# Patient Record
Sex: FEMALE | Race: WHITE | Employment: OTHER | URBAN - METROPOLITAN AREA
[De-identification: names, ages, dates, MRNs, and addresses within clinical notes are randomized per-mention and may not be internally consistent; named-entity substitution may affect disease eponyms.]

---

## 2017-10-18 ENCOUNTER — HOSPITAL ENCOUNTER (OUTPATIENT)
Dept: LAB | Age: 67
Discharge: HOME | End: 2017-10-18

## 2017-10-18 PROCEDURE — 88305 TISSUE EXAM BY PATHOLOGIST: CPT | Performed by: INTERNAL MEDICINE

## 2018-07-19 PROBLEM — E66.01 SEVERE OBESITY (BMI 35.0-39.9): Status: ACTIVE | Noted: 2018-07-19

## 2018-09-27 ENCOUNTER — HOSPITAL ENCOUNTER (OUTPATIENT)
Dept: LAB | Age: 68
Discharge: HOME | End: 2018-09-27
Attending: INTERNAL MEDICINE
Payer: MEDICARE

## 2018-09-27 DIAGNOSIS — I70.219 EXTREMITY ATHEROSCLEROSIS WITH INTERMITTENT CLAUDICATION (HCC): ICD-10-CM

## 2018-09-27 LAB
BASOPHILS # BLD: 0.1 K/UL (ref 0–0.2)
BASOPHILS NFR BLD: 1 % (ref 0–2)
DIFFERENTIAL METHOD BLD: ABNORMAL
EOSINOPHIL # BLD: 0.1 K/UL (ref 0–0.8)
EOSINOPHIL NFR BLD: 1 % (ref 0.5–7.8)
ERYTHROCYTE [DISTWIDTH] IN BLOOD BY AUTOMATED COUNT: 13 %
HCT VFR BLD AUTO: 42.9 % (ref 35.8–46.3)
HGB BLD-MCNC: 14.6 G/DL (ref 11.7–15.4)
IMM GRANULOCYTES # BLD: 0.1 K/UL (ref 0–0.5)
IMM GRANULOCYTES NFR BLD AUTO: 1 % (ref 0–5)
LYMPHOCYTES # BLD: 2.8 K/UL (ref 0.5–4.6)
LYMPHOCYTES NFR BLD: 33 % (ref 13–44)
MCH RBC QN AUTO: 31.2 PG (ref 26.1–32.9)
MCHC RBC AUTO-ENTMCNC: 34 G/DL (ref 31.4–35)
MCV RBC AUTO: 91.7 FL (ref 79.6–97.8)
MONOCYTES # BLD: 0.7 K/UL (ref 0.1–1.3)
MONOCYTES NFR BLD: 9 % (ref 4–12)
NEUTS SEG # BLD: 4.8 K/UL (ref 1.7–8.2)
NEUTS SEG NFR BLD: 56 % (ref 43–78)
NRBC # BLD: 0 K/UL (ref 0–0.2)
PLATELET # BLD AUTO: 242 K/UL (ref 150–450)
PMV BLD AUTO: 9.3 FL (ref 9.4–12.3)
RBC # BLD AUTO: 4.68 M/UL (ref 4.05–5.2)
WBC # BLD AUTO: 8.5 K/UL (ref 4.3–11.1)

## 2018-09-27 PROCEDURE — 36415 COLL VENOUS BLD VENIPUNCTURE: CPT

## 2018-09-27 PROCEDURE — 80048 BASIC METABOLIC PNL TOTAL CA: CPT

## 2018-09-27 PROCEDURE — 85025 COMPLETE CBC W/AUTO DIFF WBC: CPT

## 2018-10-05 NOTE — PROGRESS NOTES
Patient pre-assessment complete for JESSIE_DAVID with Dr Jose Luis Zeng scheduled for 10-8-18 at [address] [place] [address], arrival time 6am. Patient verified using . Patient instructed to bring all home medications in labeled bottles on the day of procedure. NPO status reinforced. Patient informed to take a full dose aspirin 325mg  or 81 mg x 4 & plavix 75 mg on the day of procedure. Patient instructed to HOLD lasis on Monday am. Instructed they can take all other medications excluding vitamins & supplements. Patient verbalizes understanding of all instructions & denies any questions at this time.

## 2018-10-08 ENCOUNTER — HOSPITAL ENCOUNTER (OUTPATIENT)
Dept: CARDIAC CATH/INVASIVE PROCEDURES | Age: 68
Discharge: HOME | End: 2018-10-08
Attending: INTERNAL MEDICINE | Admitting: INTERNAL MEDICINE
Payer: MEDICARE

## 2018-10-08 VITALS
DIASTOLIC BLOOD PRESSURE: 55 MMHG | HEIGHT: 60 IN | WEIGHT: 170 LBS | OXYGEN SATURATION: 92 % | SYSTOLIC BLOOD PRESSURE: 113 MMHG | RESPIRATION RATE: 18 BRPM | BODY MASS INDEX: 33.38 KG/M2 | HEART RATE: 54 BPM

## 2018-10-08 LAB
ANION GAP SERPL CALC-SCNC: 5 MMOL/L (ref 7–16)
ATRIAL RATE: 49 BPM
BUN SERPL-MCNC: 16 MG/DL (ref 8–23)
CALCIUM SERPL-MCNC: 9.1 MG/DL (ref 8.3–10.4)
CALCULATED P AXIS, ECG09: 44 DEGREES
CALCULATED R AXIS, ECG10: 41 DEGREES
CALCULATED T AXIS, ECG11: 10 DEGREES
CHLORIDE SERPL-SCNC: 105 MMOL/L (ref 98–107)
CO2 SERPL-SCNC: 28 MMOL/L (ref 21–32)
CREAT SERPL-MCNC: 1.28 MG/DL (ref 0.6–1)
DIAGNOSIS, 93000: NORMAL
ERYTHROCYTE [DISTWIDTH] IN BLOOD BY AUTOMATED COUNT: 12.7 %
GLUCOSE SERPL-MCNC: 95 MG/DL (ref 65–100)
HCT VFR BLD AUTO: 44.3 % (ref 35.8–46.3)
HGB BLD-MCNC: 14.9 G/DL (ref 11.7–15.4)
INR PPP: 1.3
MAGNESIUM SERPL-MCNC: 1.9 MG/DL (ref 1.8–2.4)
MCH RBC QN AUTO: 31.6 PG (ref 26.1–32.9)
MCHC RBC AUTO-ENTMCNC: 33.6 G/DL (ref 31.4–35)
MCV RBC AUTO: 93.9 FL (ref 79.6–97.8)
NRBC # BLD: 0 K/UL (ref 0–0.2)
P-R INTERVAL, ECG05: 160 MS
PLATELET # BLD AUTO: 238 K/UL (ref 150–450)
PMV BLD AUTO: 9 FL (ref 9.4–12.3)
POTASSIUM SERPL-SCNC: 4.2 MMOL/L (ref 3.5–5.1)
PROTHROMBIN TIME: 15.1 SEC (ref 11.5–14.5)
Q-T INTERVAL, ECG07: 454 MS
QRS DURATION, ECG06: 82 MS
QTC CALCULATION (BEZET), ECG08: 410 MS
RBC # BLD AUTO: 4.72 M/UL (ref 4.05–5.2)
SODIUM SERPL-SCNC: 138 MMOL/L (ref 136–145)
VENTRICULAR RATE, ECG03: 49 BPM
WBC # BLD AUTO: 7.6 K/UL (ref 4.3–11.1)

## 2018-10-08 PROCEDURE — 76937 US GUIDE VASCULAR ACCESS: CPT

## 2018-10-08 PROCEDURE — 74011000250 HC RX REV CODE- 250: Performed by: INTERNAL MEDICINE

## 2018-10-08 PROCEDURE — 75625 CONTRAST EXAM ABDOMINL AORTA: CPT

## 2018-10-08 PROCEDURE — C1760 CLOSURE DEV, VASC: HCPCS

## 2018-10-08 PROCEDURE — 93005 ELECTROCARDIOGRAM TRACING: CPT | Performed by: INTERNAL MEDICINE

## 2018-10-08 PROCEDURE — C1894 INTRO/SHEATH, NON-LASER: HCPCS

## 2018-10-08 PROCEDURE — 85027 COMPLETE CBC AUTOMATED: CPT

## 2018-10-08 PROCEDURE — 74011636320 HC RX REV CODE- 636/320: Performed by: INTERNAL MEDICINE

## 2018-10-08 PROCEDURE — 74011250636 HC RX REV CODE- 250/636: Performed by: INTERNAL MEDICINE

## 2018-10-08 PROCEDURE — 85610 PROTHROMBIN TIME: CPT

## 2018-10-08 PROCEDURE — 99152 MOD SED SAME PHYS/QHP 5/>YRS: CPT

## 2018-10-08 PROCEDURE — 77030013687 HC GD NDL BARD -B

## 2018-10-08 PROCEDURE — 77030015395 HC CATH ANGI DX SVU ANGI -C

## 2018-10-08 PROCEDURE — 36245 INS CATH ABD/L-EXT ART 1ST: CPT

## 2018-10-08 PROCEDURE — C1769 GUIDE WIRE: HCPCS

## 2018-10-08 PROCEDURE — 74011250636 HC RX REV CODE- 250/636

## 2018-10-08 PROCEDURE — 80048 BASIC METABOLIC PNL TOTAL CA: CPT

## 2018-10-08 PROCEDURE — 83735 ASSAY OF MAGNESIUM: CPT

## 2018-10-08 PROCEDURE — 99153 MOD SED SAME PHYS/QHP EA: CPT

## 2018-10-08 PROCEDURE — 75716 ARTERY X-RAYS ARMS/LEGS: CPT

## 2018-10-08 RX ORDER — MIDAZOLAM HYDROCHLORIDE 1 MG/ML
.5-5 INJECTION, SOLUTION INTRAMUSCULAR; INTRAVENOUS
Status: DISCONTINUED | OUTPATIENT
Start: 2018-10-08 | End: 2018-10-08 | Stop reason: HOSPADM

## 2018-10-08 RX ORDER — DIAZEPAM 5 MG/1
5 TABLET ORAL ONCE
Status: DISCONTINUED | OUTPATIENT
Start: 2018-10-08 | End: 2018-10-08 | Stop reason: HOSPADM

## 2018-10-08 RX ORDER — GUAIFENESIN 100 MG/5ML
324 LIQUID (ML) ORAL ONCE
Status: DISCONTINUED | OUTPATIENT
Start: 2018-10-08 | End: 2018-10-08 | Stop reason: HOSPADM

## 2018-10-08 RX ORDER — HEPARIN SODIUM 200 [USP'U]/100ML
3 INJECTION, SOLUTION INTRAVENOUS CONTINUOUS
Status: DISCONTINUED | OUTPATIENT
Start: 2018-10-08 | End: 2018-10-08 | Stop reason: HOSPADM

## 2018-10-08 RX ORDER — IODIXANOL 320 MG/ML
50-300 INJECTION, SOLUTION INTRAVASCULAR ONCE
Status: COMPLETED | OUTPATIENT
Start: 2018-10-08 | End: 2018-10-08

## 2018-10-08 RX ORDER — SODIUM CHLORIDE 9 MG/ML
75 INJECTION, SOLUTION INTRAVENOUS CONTINUOUS
Status: DISCONTINUED | OUTPATIENT
Start: 2018-10-08 | End: 2018-10-08 | Stop reason: HOSPADM

## 2018-10-08 RX ORDER — LIDOCAINE HYDROCHLORIDE 10 MG/ML
1-40 INJECTION INFILTRATION; PERINEURAL
Status: DISCONTINUED | OUTPATIENT
Start: 2018-10-08 | End: 2018-10-08 | Stop reason: HOSPADM

## 2018-10-08 RX ORDER — FENTANYL CITRATE 50 UG/ML
25-200 INJECTION, SOLUTION INTRAMUSCULAR; INTRAVENOUS
Status: DISCONTINUED | OUTPATIENT
Start: 2018-10-08 | End: 2018-10-08 | Stop reason: HOSPADM

## 2018-10-08 RX ADMIN — IODIXANOL 100 ML: 320 INJECTION, SOLUTION INTRAVASCULAR at 09:02

## 2018-10-08 RX ADMIN — SODIUM CHLORIDE 75 ML/HR: 900 INJECTION, SOLUTION INTRAVENOUS at 06:46

## 2018-10-08 RX ADMIN — MIDAZOLAM HYDROCHLORIDE 2 MG: 1 INJECTION, SOLUTION INTRAMUSCULAR; INTRAVENOUS at 08:38

## 2018-10-08 RX ADMIN — FENTANYL CITRATE 50 MCG: 50 INJECTION, SOLUTION INTRAMUSCULAR; INTRAVENOUS at 08:39

## 2018-10-08 RX ADMIN — NITROGLYCERIN 0.4 MG: 200 INJECTION, SOLUTION INTRAVENOUS at 08:39

## 2018-10-08 RX ADMIN — HEPARIN SODIUM 3 ML/HR: 200 INJECTION, SOLUTION INTRAVENOUS at 08:26

## 2018-10-08 RX ADMIN — LIDOCAINE HYDROCHLORIDE 20 ML: 10 INJECTION, SOLUTION INFILTRATION; PERINEURAL at 08:27

## 2018-10-08 NOTE — PROCEDURES
Brief Cardiac Procedure Note    Patient: John Query MRN: [id number]  SSN: [id number]    YOB: 1950  Age: 79 y.o. Sex: female      Date of Procedure: 10/8/2018     Pre-procedure Diagnosis: PAD with Claudication    Post-procedure Diagnosis: PAD with Claudication    Procedure:   1.  US access RFA  2. Abdominal aorta gram x 3  3. Oblique pelvic run off  - 1st order catheter placement     Brief Description of Procedure: See note    Performed By: Jacob Meigs, MD     Assistants: None    Anesthesia: Moderate Sedation    Estimated Blood Loss: Less than 10 mL      Specimens: None    Implants: None    Findings:   60-70% distal aorta  95% ostial (R)INGRID  70% ostial (L)INGRID    Complications: None    Recommendations: Continue medical therapy.   Refer to vascular surgery     Signed By: Jacob Meigs, MD     October 8, 2018

## 2018-10-08 NOTE — IP AVS SNAPSHOT
[address] 
[phone] Patient: Belén Stoll MRN: [id number] UID:[id number] Discharge Summary 10/8/2018 Belén Stoll MRN[de-identified]  [id number] Admission Information Provider Pager Service Admission Date Expected D/C Date Myriam Dubon MD  CARDIAC CATH LAB 10/8/2018 10/8/2018 Actual LOS Patient Class 0 days OUTPATIENT Follow-up Information Follow up With Details Comments Contact Info [address] [facility] [address] 
[phone] Brittnee Cruz MD On 11/6/2018 at 10 am [place] [address] [address] [facility] [address] [phone] My Medications TAKE these medications as instructed Instructions Each Dose to Equal  
 Morning Noon Evening Bedtime  
 atenolol 25 mg tablet Commonly known as:  TENORMIN Your last dose was: Your next dose is: Take 1 Tab by mouth two (2) times a day. Indications: hypertension 25 mg  
    
   
   
   
  
 atorvastatin 20 mg tablet Commonly known as:  LIPITOR Your last dose was: Your next dose is: Take  by mouth daily. buPROPion  mg tablet Commonly known as:  Jimenez Dc Your last dose was: Your next dose is: Take 150 mg by mouth every morning. 150 mg CALCIUM-MAGNESIUM-ZINC PO Your last dose was: Your next dose is: Take  by mouth daily. clopidogrel 75 mg Tab Commonly known as:  PLAVIX Your last dose was: Your next dose is: Take 1 Tab by mouth daily. 75 mg FISH OIL EXTRA STRENGTH PO Your last dose was: Your next dose is: Take 1,200 mg by mouth daily. 1200 mg  
    
   
   
   
  
 LASIX 40 mg tablet Generic drug:  furosemide Your last dose was: Your next dose is: Take  by mouth daily. As needed LEXAPRO 10 mg tablet Generic drug:  escitalopram oxalate Your last dose was: Your next dose is: Take 10 mg by mouth daily. 10 mg  
    
   
   
   
  
 nitroglycerin 0.4 mg SL tablet Commonly known as:  NITROSTAT Your last dose was: Your next dose is: 0.4 mg by SubLINGual route every five (5) minutes as needed for Chest Pain. 0.4 mg  
    
   
   
   
  
 Potassium Gluconate 595 mg (99 mg) tablet Your last dose was: Your next dose is: Take 595 mg by mouth nightly. 595 mg  
    
   
   
   
  
 VITAMIN B-12 PO Your last dose was: Your next dose is: Take 1 Tab by mouth daily. 1 Tab General Information Please provide this summary of care documentation to your next provider. Allergies Unspecified:  Adhesive Tape;  Codeine;  Pcn [Penicillins] Low:  Other Medication Current Immunizations  Reviewed on 6/30/2012 Name Date ZZZ-RETIRED (DO NOT USE) Pneumococcal Vaccine (Unspecified Type) 6/30/2006 Discharge Instructions Discharge Instructions Outpatient radiology will call to schedule the CT scan in the next week. Follow-up appointment with Dr. Nola Rene on November 6th at 10 am at [address] [facility]. HEART CATHETERIZATION/ANGIOGRAPHY DISCHARGE INSTRUCTIONS 1. Check puncture site frequently for swelling or bleeding. If there is any bleeding, lie down and apply pressure over the area with a clean towel or washcloth. Notify your doctor for any redness, swelling, drainage, or oozing from the puncture site. Notify your doctor for any fever or chills. 2. If the extremity becomes cold, numb, or painful call Dr. Uzma Crowley at [phone]. 3. Activity should be limited for the next 48 hours. Climb stairs as little as possible and avoid any stooping, bending, or strenuous activity for 48 hours. No heavy lifting (anything over 10 pounds) for 3 days. 4. You may resume your usual diet. Drink more fluids than usual. 
5. Have a responsible person drive you home and stay with you for at least 24 hours after your heart catheterization/angiography. 6. You may remove bandage from your Right and Groin in 24 hours. You may shower in 24 hours. No tub baths, hot tubs, or swimming for 1 week. Do not place any lotions, creams, powders, or ointments over puncture site for 1 week. You may place a clean band-aid over the puncture site each day for 5 days. Change daily. I have read the above instructions and have had the opportunity to ask questions. Patient: ________________________   Date: 10/8/2018 Witness: _______________________   Date: 10/8/2018 Discharge Orders Procedure Order Date Status Priority Quantity Spec Type Associated Dx CTA ABD ART W RUNOFF W WO CONT 10/08/18 0915 Future Routine 1 Questions: Is Patient Allergic to Contrast Dye?:  No  
  STAT Creatinine as indicated: Yes  
        
  
`  
  
    
    
 Patient Signature:  ____________________________________________________________ Date:  ____________________________________________________________  
  
 Alejandra Eaton Provider Signature:  ____________________________________________________________ Date:  ____________________________________________________________

## 2018-10-08 NOTE — PROGRESS NOTES
TRANSFER - OUT REPORT: 
 
Verbal report given to [facility] RN(name) on Marycruz Varner  being transferred to CPRU(unit) for routine progression of care Report consisted of patients Situation, Background, Assessment and  
Recommendations(SBAR). Information from the following report(s) SBAR and Procedure Summary was reviewed with the receiving nurse. Opportunity for questions and clarification was provided. Procedure: LEI/JESSIE   Finding Summary: Diagnostic with surgical consult(cath/pci/pacer settings) Location: RFA    Closure Device: mynx(yes/no/description) Post Site Assessment: no oozing or hematoma, verified with Ivan Hi RN Pre Procedure Meds:(if any) ASA: 324 mg  When Received:  0500 Antiplatelet: 75 mg Plavix at 0500 Intra Procedure Meds: 
 
Versed: 2 mg Fentanyl: 50 mcg * Peripheral IV 10/08/18 Left Antecubital (Active) Peripheral IV 10/08/18 Right Wrist (Active) Sheath 10/08/18 (Active)  
 
  
  
  
  
  
  
  
  
is allergic to adhesive tape; codeine; pcn [penicillins]; and other medication. Past Medical History:  
Diagnosis Date  Adverse effect of anesthesia   
 hard to wake after  Angina pectoris ([address] 10/13/2015  Arthritis   
 hands & hips  CAD (coronary artery disease) 5/06 MI and 4 vessel CABG, patient states she has angina pain-last time takingNTG 2 week ago  Chest pain, unspecified 5/27/2011  COPD   
 not medicated now  Coronary atherosclerosis of native coronary artery 10/13/2015  Dyslipidemia 5/27/2011  Dyspnea/Shortness of breath 10/13/2015  Extremity atherosclerosis with intermittent claudication ([address] 10/13/2015  Heart failure ([address]  Hx of CABG 5/27/2011 LIMA and 3 SVG. 2 OMs/PLV and one acute marginal of RCA  Hypertension  LVH (left ventricular hypertrophy) (Cardiomegaly)  10/13/2015  Peripheral arterial disease ([address] 6/29/2012  Pleural effusion 10/13/2015  Psychiatric disorder  S/P coronary artery stent placement  5/27/2011  
 2.0 x 15mm Xience to OM2-5/27/11   
 S/P peripheral artery angioplasty 6/29/2012  Severe obesity (BMI 35.0-39. 9) 7/19/2018  Sleep apnea   
 does not wear cpap  Tobacco use disorder 10/13/2015  Unspecified adverse effect of anesthesia   
 slow to wake up Visit Vitals  /46 (BP 1 Location: Right arm, BP Patient Position: At rest)  Pulse (!) 48  Resp 20  
 Ht 5' (1.524 m)  Wt 77.1 kg (170 lb)  SpO2 94%  Breastfeeding No  
 BMI 33.2 kg/m2

## 2018-10-08 NOTE — DISCHARGE INSTRUCTIONS
Outpatient radiology will call to schedule the CT scan in the next week. Follow-up appointment with Dr. Mando Lopez on November 6th at 10 am at [address] [facility]. HEART CATHETERIZATION/ANGIOGRAPHY DISCHARGE INSTRUCTIONS    1. Check puncture site frequently for swelling or bleeding. If there is any bleeding, lie down and apply pressure over the area with a clean towel or washcloth. Notify your doctor for any redness, swelling, drainage, or oozing from the puncture site. Notify your doctor for any fever or chills. 2. If the extremity becomes cold, numb, or painful call Dr. Magda Omalley at [phone].  3. Activity should be limited for the next 48 hours. Climb stairs as little as possible and avoid any stooping, bending, or strenuous activity for 48 hours. No heavy lifting (anything over 10 pounds) for 3 days. 4. You may resume your usual diet. Drink more fluids than usual.  5. Have a responsible person drive you home and stay with you for at least 24 hours after your heart catheterization/angiography. 6. You may remove bandage from your Right and Groin in 24 hours. You may shower in 24 hours. No tub baths, hot tubs, or swimming for 1 week. Do not place any lotions, creams, powders, or ointments over puncture site for 1 week. You may place a clean band-aid over the puncture site each day for 5 days. Change daily. I have read the above instructions and have had the opportunity to ask questions.       Patient: ________________________   Date: 10/8/2018    Witness: _______________________   Date: 10/8/2018

## 2018-10-08 NOTE — PROGRESS NOTES
Report received from Curly Alvarenga. Procedural findings communicated. Intra procedural  medication administration reviewed. Progression of care discussed. Patient received into [address] [facility] 7 post sheath removal.  
 
Access site without bleeding or swelling yes Dressing dry and intact yes Patient instructed to limit movement to right lower extremity. Routine post procedural vital signs and site assessment initiated yes

## 2018-10-08 NOTE — PROGRESS NOTES
Patient took Aspirin 324mg today at 0500 prior to arrival. Patient took plavix 75mg at 0500 prior to arrival.

## 2018-10-08 NOTE — PROCEDURES
[address]    Bo Schneider  MR#: [id number]  : 1950  ACCOUNT #: [de-identified]   DATE OF SERVICE: 10/08/2018    PRIMARY CARDIOLOGIST:  Markos Santillan MD     PRIMARY CARE PROVIDER:  BALJINDER Pisano     BRIEF HISTORY:  The patient is a very pleasant 82-year-old female with history of common iliac artery stenosis. She presents now with progressive bilateral buttock claudication consistent with recurrent iliac disease. Ultrasound suggests bilateral iliac disease. Referred now for angiography, potential intervention. DESCRIPTION OF PROCEDURE:  After informed consent, she was prepped and draped in usual sterile fashion. The right groin was infiltrated with lidocaine. Under ultrasound guidance, the right femoral artery was accessed and a 6-Bahamian sheath was advanced. A 5-Bahamian Omniflush catheter was utilized for abdominal aortography and bilateral oblique iliac and pelvic runoff. First order catheter placement was utilized. Isovue contrast utilized. CONSCIOUS SEDATION:  Start time 8:41, end time 9:07. MEDICATIONS:  2 mg Versed, 50 mcg fentanyl. MONITORING RN:  Huma Major Weekly. FINDINGS:  1. Abdominal aorta:  Abdominal aorta is moderately calcified with diffuse irregularities. There is brisk tapering distally with a 60-70% distal aortic stenosis extending into bilateral common iliac arteries. 2.  Right iliofemoral system: This has a 95% aorto-ostial stenosis. The stent itself remains widely patent. The rest of the iliofemoral system is angiographically normal.  3.  Left iliofemoral runoff: The stent itself has a 50% ostial stenosis present. The stent otherwise remains widely patent. The rest of the iliofemoral system appears entirely normal.    Successful hemostasis with a Mynx closure device.     CONCLUSIONS:  Severe distal aortic stenosis extending into bilateral common iliacs with critical ostial right common iliac artery stenosis. RECOMMENDATIONS:  The patient will undergo CT angiography of the abdominal aorta and runoff. We will schedule followup with Dr. Hang Gary to consider treatment of distal aortic disease. Thank you for allowing us to participate in the care of this patient. If there are questions or concerns, please feel free to contact me.       Delane Hammans, MD MAG / YOLANDA  D: 10/08/2018 09:19     T: 10/08/2018 09:35  JOB #: [id number]  CC: Emma Morse MD  CC: [address]

## 2018-10-08 NOTE — PROGRESS NOTES
Patient received to [address] [place] [address] room # 12  Ambulatory from [facility]. Patient scheduled for JESSIE/LEI today with Dr Nuha Burgess. Procedure reviewed & questions answered, voiced good understanding consent obtained & placed on chart. All medications and medical history reviewed. Will prep patient per orders. Patient & family updated on plan of care. The patient has a fraility score of 3-MANAGING WELL, based on patient A&Ox3, patient able to perform ADL's independently, patient able to ambulate to room without difficulty.

## 2018-10-08 NOTE — PROGRESS NOTES
Patient up to bedside, vital signs and site stable. Patient ambulated to bathroom without difficulty. Patient voided without difficulty. Vascular site stable. Discharge instructions and home medications reviewed with patient. Time allowed for questions and answers. 1125Site stable after ambulation. Peripheral IV sites dc'd without difficulty with tips intact. 1130 Patient discharged to home with family.

## 2018-11-01 ENCOUNTER — HOSPITAL ENCOUNTER (OUTPATIENT)
Dept: CT IMAGING | Age: 68
Discharge: HOME | End: 2018-11-01
Attending: INTERNAL MEDICINE
Payer: MEDICARE

## 2018-11-01 LAB — CREAT BLD-MCNC: 1.1 MG/DL (ref 0.8–1.5)

## 2018-11-01 PROCEDURE — 75635 CT ANGIO ABDOMINAL ARTERIES: CPT

## 2018-11-01 PROCEDURE — 74011000258 HC RX REV CODE- 258: Performed by: INTERNAL MEDICINE

## 2018-11-01 PROCEDURE — 82565 ASSAY OF CREATININE: CPT

## 2018-11-01 PROCEDURE — 74011636320 HC RX REV CODE- 636/320: Performed by: INTERNAL MEDICINE

## 2018-11-01 RX ORDER — SODIUM CHLORIDE 0.9 % (FLUSH) 0.9 %
10 SYRINGE (ML) INJECTION
Status: COMPLETED | OUTPATIENT
Start: 2018-11-01 | End: 2018-11-01

## 2018-11-01 RX ADMIN — SODIUM CHLORIDE 100 ML: 900 INJECTION, SOLUTION INTRAVENOUS at 08:57

## 2018-11-01 RX ADMIN — Medication 10 ML: at 08:57

## 2018-11-01 RX ADMIN — IOPAMIDOL 125 ML: 755 INJECTION, SOLUTION INTRAVENOUS at 08:56

## 2018-11-08 ENCOUNTER — HOSPITAL ENCOUNTER (OUTPATIENT)
Dept: SURGERY | Age: 68
Discharge: HOME | End: 2018-11-08
Payer: MEDICARE

## 2018-11-08 VITALS
BODY MASS INDEX: 34.36 KG/M2 | TEMPERATURE: 98.8 F | DIASTOLIC BLOOD PRESSURE: 55 MMHG | RESPIRATION RATE: 16 BRPM | SYSTOLIC BLOOD PRESSURE: 127 MMHG | OXYGEN SATURATION: 97 % | WEIGHT: 175 LBS | HEART RATE: 60 BPM | HEIGHT: 60 IN

## 2018-11-08 LAB
ANION GAP SERPL CALC-SCNC: 8 MMOL/L (ref 7–16)
BUN SERPL-MCNC: 15 MG/DL (ref 8–23)
CALCIUM SERPL-MCNC: 9.2 MG/DL (ref 8.3–10.4)
CHLORIDE SERPL-SCNC: 104 MMOL/L (ref 98–107)
CO2 SERPL-SCNC: 27 MMOL/L (ref 21–32)
CREAT SERPL-MCNC: 1.29 MG/DL (ref 0.6–1)
CREAT SERPL-MCNC: 1.35 MG/DL (ref 0.6–1)
ERYTHROCYTE [DISTWIDTH] IN BLOOD BY AUTOMATED COUNT: 12.1 %
GLUCOSE SERPL-MCNC: 97 MG/DL (ref 65–100)
HCT VFR BLD AUTO: 44.6 % (ref 35.8–46.3)
HGB BLD-MCNC: 14.9 G/DL (ref 11.7–15.4)
MCH RBC QN AUTO: 30.8 PG (ref 26.1–32.9)
MCHC RBC AUTO-ENTMCNC: 33.4 G/DL (ref 31.4–35)
MCV RBC AUTO: 92.3 FL (ref 79.6–97.8)
NRBC # BLD: 0 K/UL (ref 0–0.2)
PLATELET # BLD AUTO: 250 K/UL (ref 150–450)
PMV BLD AUTO: 9.3 FL (ref 9.4–12.3)
POTASSIUM SERPL-SCNC: 4.6 MMOL/L (ref 3.5–5.1)
RBC # BLD AUTO: 4.83 M/UL (ref 4.05–5.2)
SODIUM SERPL-SCNC: 139 MMOL/L (ref 136–145)
WBC # BLD AUTO: 7.5 K/UL (ref 4.3–11.1)

## 2018-11-08 PROCEDURE — 85027 COMPLETE CBC AUTOMATED: CPT

## 2018-11-08 PROCEDURE — 36415 COLL VENOUS BLD VENIPUNCTURE: CPT

## 2018-11-08 PROCEDURE — 80048 BASIC METABOLIC PNL TOTAL CA: CPT

## 2018-11-08 RX ORDER — LANOLIN ALCOHOL/MO/W.PET/CERES
CREAM (GRAM) TOPICAL
COMMUNITY
End: 2019-02-13

## 2018-11-08 NOTE — PERIOP NOTES
Patient verified name and . Patient provided medical/health information and PTA medications to the best of their ability. TYPE  CASE:1b 
Order for consent yes found in EHR and matches case posting. Labs per surgeon:cbc,bmp. Results: - 
Labs per anesthesia protocol: none. Results - 
EKG  :   Patient provided with and instructed on education handouts including Guide to Surgery, blood transfusions, pain management, and hand hygiene for the family and community, and [facility] Anesthesia [facility] brochure.  
 
hibiclens and instructions given per hospital policy. Instructed patient to continue previous medications as prescribed prior to surgery unless otherwise directed and to take the following medications the day of surgery according to anesthesia guidelines : aspirin 81 mg while holding plavix,atenolol,wellbutrin,lexapro . Instructed patient to hold  the following medications: calcium,vit b-12,melatonin,fish oil plavix. Original medication prescription bottles most visualized during patient appointment. Patient teach back successful and patient demonstrates knowledge of instruction.

## 2018-11-08 NOTE — PERIOP NOTES
Dr. Sarthak Mejia informed of pt Hx and holding plavix. Per Dr. Sarthak Mejia pt is to start aspirin 81 mg while holding plavix- pt informed and verbalized understanding.

## 2018-11-12 ENCOUNTER — ANESTHESIA (OUTPATIENT)
Dept: SURGERY | Age: 68
DRG: 253 | End: 2018-11-12
Payer: MEDICARE

## 2018-11-12 ENCOUNTER — ANESTHESIA EVENT (OUTPATIENT)
Dept: SURGERY | Age: 68
DRG: 253 | End: 2018-11-12
Payer: MEDICARE

## 2018-11-12 ENCOUNTER — APPOINTMENT (OUTPATIENT)
Dept: INTERVENTIONAL RADIOLOGY/VASCULAR | Age: 68
DRG: 253 | End: 2018-11-12
Attending: SURGERY
Payer: MEDICARE

## 2018-11-12 ENCOUNTER — HOSPITAL ENCOUNTER (INPATIENT)
Age: 68
LOS: 1 days | Discharge: HOME | DRG: 253 | End: 2018-11-13
Attending: SURGERY | Admitting: SURGERY
Payer: MEDICARE

## 2018-11-12 PROBLEM — I73.9 PVD (PERIPHERAL VASCULAR DISEASE) WITH CLAUDICATION (HCC): Status: ACTIVE | Noted: 2018-11-12

## 2018-11-12 LAB
ERYTHROCYTE [DISTWIDTH] IN BLOOD BY AUTOMATED COUNT: 12.3 %
ERYTHROCYTE [DISTWIDTH] IN BLOOD BY AUTOMATED COUNT: 12.4 %
HCT VFR BLD AUTO: 36.7 % (ref 35.8–46.3)
HCT VFR BLD AUTO: 37.3 % (ref 35.8–46.3)
HGB BLD-MCNC: 12 G/DL (ref 11.7–15.4)
HGB BLD-MCNC: 12.1 G/DL (ref 11.7–15.4)
MCH RBC QN AUTO: 30.9 PG (ref 26.1–32.9)
MCH RBC QN AUTO: 31.4 PG (ref 26.1–32.9)
MCHC RBC AUTO-ENTMCNC: 32.4 G/DL (ref 31.4–35)
MCHC RBC AUTO-ENTMCNC: 32.7 G/DL (ref 31.4–35)
MCV RBC AUTO: 95.4 FL (ref 79.6–97.8)
MCV RBC AUTO: 96.1 FL (ref 79.6–97.8)
NRBC # BLD: 0 K/UL (ref 0–0.2)
NRBC # BLD: 0 K/UL (ref 0–0.2)
PLATELET # BLD AUTO: 186 K/UL (ref 150–450)
PLATELET # BLD AUTO: 210 K/UL (ref 150–450)
PMV BLD AUTO: 9.2 FL (ref 9.4–12.3)
PMV BLD AUTO: 9.2 FL (ref 9.4–12.3)
RBC # BLD AUTO: 3.82 M/UL (ref 4.05–5.2)
RBC # BLD AUTO: 3.91 M/UL (ref 4.05–5.2)
WBC # BLD AUTO: 9.3 K/UL (ref 4.3–11.1)
WBC # BLD AUTO: 9.8 K/UL (ref 4.3–11.1)

## 2018-11-12 PROCEDURE — 77030020782 HC GWN BAIR PAWS FLX 3M -B: Performed by: ANESTHESIOLOGY

## 2018-11-12 PROCEDURE — 74011250637 HC RX REV CODE- 250/637: Performed by: SURGERY

## 2018-11-12 PROCEDURE — C1887 CATHETER, GUIDING: HCPCS

## 2018-11-12 PROCEDURE — 74011250637 HC RX REV CODE- 250/637: Performed by: ANESTHESIOLOGY

## 2018-11-12 PROCEDURE — 74011250636 HC RX REV CODE- 250/636

## 2018-11-12 PROCEDURE — C1769 GUIDE WIRE: HCPCS

## 2018-11-12 PROCEDURE — 74011250636 HC RX REV CODE- 250/636: Performed by: SURGERY

## 2018-11-12 PROCEDURE — B41D1ZZ FLUOROSCOPY OF AORTA AND BILATERAL LOWER EXTREMITY ARTERIES USING LOW OSMOLAR CONTRAST: ICD-10-PCS | Performed by: SURGERY

## 2018-11-12 PROCEDURE — 74011636320 HC RX REV CODE- 636/320: Performed by: SURGERY

## 2018-11-12 PROCEDURE — 76210000004 HC OR PH I REC 4.5 TO 5 HR: Performed by: SURGERY

## 2018-11-12 PROCEDURE — 85027 COMPLETE CBC AUTOMATED: CPT

## 2018-11-12 PROCEDURE — 74011000250 HC RX REV CODE- 250

## 2018-11-12 PROCEDURE — C1760 CLOSURE DEV, VASC: HCPCS

## 2018-11-12 PROCEDURE — C1874 STENT, COATED/COV W/DEL SYS: HCPCS

## 2018-11-12 PROCEDURE — 74011250636 HC RX REV CODE- 250/636: Performed by: ANESTHESIOLOGY

## 2018-11-12 PROCEDURE — 65660000004 HC RM CVT STEPDOWN

## 2018-11-12 PROCEDURE — C1725 CATH, TRANSLUMIN NON-LASER: HCPCS

## 2018-11-12 PROCEDURE — 75716 ARTERY X-RAYS ARMS/LEGS: CPT

## 2018-11-12 PROCEDURE — 37221 HC PLC STNT ILIAC +/- PTA INIT: CPT

## 2018-11-12 PROCEDURE — 76010000153 HC OR TIME 1.5 TO 2 HR: Performed by: SURGERY

## 2018-11-12 PROCEDURE — 027X3DZ DILATION OF THORACIC AORTA, ASCENDING/ARCH WITH INTRALUMINAL DEVICE, PERCUTANEOUS APPROACH: ICD-10-PCS | Performed by: SURGERY

## 2018-11-12 PROCEDURE — 047C3DZ DILATION OF RIGHT COMMON ILIAC ARTERY WITH INTRALUMINAL DEVICE, PERCUTANEOUS APPROACH: ICD-10-PCS | Performed by: SURGERY

## 2018-11-12 PROCEDURE — C1894 INTRO/SHEATH, NON-LASER: HCPCS

## 2018-11-12 PROCEDURE — 76060000035 HC ANESTHESIA 2 TO 2.5 HR: Performed by: SURGERY

## 2018-11-12 PROCEDURE — 77030013519 HC DEV INFL BASIX MRTM -B

## 2018-11-12 PROCEDURE — 74011000250 HC RX REV CODE- 250: Performed by: ANESTHESIOLOGY

## 2018-11-12 DEVICE — ICAST COVERED STENT, 9MMX38MMX80CM
Type: IMPLANTABLE DEVICE | Site: GROIN | Status: FUNCTIONAL
Brand: ICAST

## 2018-11-12 RX ORDER — SODIUM CHLORIDE 0.9 % (FLUSH) 0.9 %
5-10 SYRINGE (ML) INJECTION AS NEEDED
Status: DISCONTINUED | OUTPATIENT
Start: 2018-11-12 | End: 2018-11-12 | Stop reason: HOSPADM

## 2018-11-12 RX ORDER — ESCITALOPRAM OXALATE 10 MG/1
10 TABLET ORAL DAILY
Status: DISCONTINUED | OUTPATIENT
Start: 2018-11-13 | End: 2018-11-13 | Stop reason: HOSPADM

## 2018-11-12 RX ORDER — MORPHINE SULFATE 2 MG/ML
2 INJECTION, SOLUTION INTRAMUSCULAR; INTRAVENOUS
Status: DISCONTINUED | OUTPATIENT
Start: 2018-11-12 | End: 2018-11-13 | Stop reason: HOSPADM

## 2018-11-12 RX ORDER — ATROPINE SULFATE 0.4 MG/ML
0.5 INJECTION, SOLUTION ENDOTRACHEAL; INTRAMEDULLARY; INTRAMUSCULAR; INTRAVENOUS; SUBCUTANEOUS ONCE
Status: DISPENSED | OUTPATIENT
Start: 2018-11-12 | End: 2018-11-12

## 2018-11-12 RX ORDER — SODIUM CHLORIDE, SODIUM LACTATE, POTASSIUM CHLORIDE, CALCIUM CHLORIDE 600; 310; 30; 20 MG/100ML; MG/100ML; MG/100ML; MG/100ML
125 INJECTION, SOLUTION INTRAVENOUS CONTINUOUS
Status: DISCONTINUED | OUTPATIENT
Start: 2018-11-12 | End: 2018-11-12 | Stop reason: HOSPADM

## 2018-11-12 RX ORDER — FUROSEMIDE 40 MG/1
40 TABLET ORAL DAILY
Status: DISCONTINUED | OUTPATIENT
Start: 2018-11-13 | End: 2018-11-13 | Stop reason: HOSPADM

## 2018-11-12 RX ORDER — SODIUM CHLORIDE 9 MG/ML
75 INJECTION, SOLUTION INTRAVENOUS CONTINUOUS
Status: DISCONTINUED | OUTPATIENT
Start: 2018-11-12 | End: 2018-11-13 | Stop reason: HOSPADM

## 2018-11-12 RX ORDER — LABETALOL HYDROCHLORIDE 5 MG/ML
INJECTION, SOLUTION INTRAVENOUS AS NEEDED
Status: DISCONTINUED | OUTPATIENT
Start: 2018-11-12 | End: 2018-11-12 | Stop reason: HOSPADM

## 2018-11-12 RX ORDER — SODIUM CHLORIDE, SODIUM LACTATE, POTASSIUM CHLORIDE, CALCIUM CHLORIDE 600; 310; 30; 20 MG/100ML; MG/100ML; MG/100ML; MG/100ML
100 INJECTION, SOLUTION INTRAVENOUS CONTINUOUS
Status: DISCONTINUED | OUTPATIENT
Start: 2018-11-12 | End: 2018-11-12 | Stop reason: HOSPADM

## 2018-11-12 RX ORDER — HEPARIN SODIUM 1000 [USP'U]/ML
INJECTION, SOLUTION INTRAVENOUS; SUBCUTANEOUS AS NEEDED
Status: DISCONTINUED | OUTPATIENT
Start: 2018-11-12 | End: 2018-11-12 | Stop reason: HOSPADM

## 2018-11-12 RX ORDER — PROTAMINE SULFATE 10 MG/ML
INJECTION, SOLUTION INTRAVENOUS AS NEEDED
Status: DISCONTINUED | OUTPATIENT
Start: 2018-11-12 | End: 2018-11-12 | Stop reason: HOSPADM

## 2018-11-12 RX ORDER — HYDROCODONE BITARTRATE AND ACETAMINOPHEN 5; 325 MG/1; MG/1
1 TABLET ORAL
Status: DISCONTINUED | OUTPATIENT
Start: 2018-11-12 | End: 2018-11-13 | Stop reason: HOSPADM

## 2018-11-12 RX ORDER — LIDOCAINE HYDROCHLORIDE 20 MG/ML
INJECTION, SOLUTION EPIDURAL; INFILTRATION; INTRACAUDAL; PERINEURAL AS NEEDED
Status: DISCONTINUED | OUTPATIENT
Start: 2018-11-12 | End: 2018-11-12 | Stop reason: HOSPADM

## 2018-11-12 RX ORDER — EPHEDRINE SULFATE/0.9% NACL/PF 25 MG/5 ML
12 SYRINGE (ML) INTRAVENOUS ONCE
Status: COMPLETED | OUTPATIENT
Start: 2018-11-12 | End: 2018-11-12

## 2018-11-12 RX ORDER — SODIUM CHLORIDE 0.9 % (FLUSH) 0.9 %
5-10 SYRINGE (ML) INJECTION EVERY 8 HOURS
Status: DISCONTINUED | OUTPATIENT
Start: 2018-11-12 | End: 2018-11-13 | Stop reason: HOSPADM

## 2018-11-12 RX ORDER — SODIUM CHLORIDE 0.9 % (FLUSH) 0.9 %
5-10 SYRINGE (ML) INJECTION EVERY 8 HOURS
Status: DISCONTINUED | OUTPATIENT
Start: 2018-11-12 | End: 2018-11-12 | Stop reason: HOSPADM

## 2018-11-12 RX ORDER — EPHEDRINE SULFATE 50 MG/ML
25 INJECTION, SOLUTION INTRAVENOUS ONCE
Status: ACTIVE | OUTPATIENT
Start: 2018-11-12 | End: 2018-11-12

## 2018-11-12 RX ORDER — NALOXONE HYDROCHLORIDE 0.4 MG/ML
0.1 INJECTION, SOLUTION INTRAMUSCULAR; INTRAVENOUS; SUBCUTANEOUS AS NEEDED
Status: DISCONTINUED | OUTPATIENT
Start: 2018-11-12 | End: 2018-11-12 | Stop reason: HOSPADM

## 2018-11-12 RX ORDER — EPHEDRINE SULFATE/0.9% NACL/PF 25 MG/5 ML
15 SYRINGE (ML) INTRAVENOUS ONCE
Status: COMPLETED | OUTPATIENT
Start: 2018-11-12 | End: 2018-11-12

## 2018-11-12 RX ORDER — LIDOCAINE HYDROCHLORIDE 10 MG/ML
0.1 INJECTION INFILTRATION; PERINEURAL AS NEEDED
Status: DISCONTINUED | OUTPATIENT
Start: 2018-11-12 | End: 2018-11-12 | Stop reason: HOSPADM

## 2018-11-12 RX ORDER — ACETAMINOPHEN 500 MG
1000 TABLET ORAL ONCE
Status: COMPLETED | OUTPATIENT
Start: 2018-11-12 | End: 2018-11-12

## 2018-11-12 RX ORDER — MORPHINE SULFATE 10 MG/ML
1 INJECTION, SOLUTION INTRAMUSCULAR; INTRAVENOUS
Status: DISCONTINUED | OUTPATIENT
Start: 2018-11-12 | End: 2018-11-13 | Stop reason: HOSPADM

## 2018-11-12 RX ORDER — HEPARIN SODIUM 5000 [USP'U]/ML
INJECTION, SOLUTION INTRAVENOUS; SUBCUTANEOUS AS NEEDED
Status: DISCONTINUED | OUTPATIENT
Start: 2018-11-12 | End: 2018-11-12 | Stop reason: HOSPADM

## 2018-11-12 RX ORDER — PROPOFOL 10 MG/ML
INJECTION, EMULSION INTRAVENOUS
Status: DISCONTINUED | OUTPATIENT
Start: 2018-11-12 | End: 2018-11-12 | Stop reason: HOSPADM

## 2018-11-12 RX ORDER — HYDROMORPHONE HYDROCHLORIDE 2 MG/ML
0.5 INJECTION, SOLUTION INTRAMUSCULAR; INTRAVENOUS; SUBCUTANEOUS
Status: DISCONTINUED | OUTPATIENT
Start: 2018-11-12 | End: 2018-11-12 | Stop reason: HOSPADM

## 2018-11-12 RX ORDER — CEFAZOLIN SODIUM/WATER 2 G/20 ML
2 SYRINGE (ML) INTRAVENOUS ONCE
Status: COMPLETED | OUTPATIENT
Start: 2018-11-12 | End: 2018-11-12

## 2018-11-12 RX ORDER — OXYCODONE HYDROCHLORIDE 5 MG/1
10 TABLET ORAL
Status: DISCONTINUED | OUTPATIENT
Start: 2018-11-12 | End: 2018-11-12 | Stop reason: HOSPADM

## 2018-11-12 RX ORDER — LIDOCAINE HYDROCHLORIDE 10 MG/ML
INJECTION INFILTRATION; PERINEURAL AS NEEDED
Status: DISCONTINUED | OUTPATIENT
Start: 2018-11-12 | End: 2018-11-12 | Stop reason: HOSPADM

## 2018-11-12 RX ORDER — ONDANSETRON 2 MG/ML
4 INJECTION INTRAMUSCULAR; INTRAVENOUS
Status: DISCONTINUED | OUTPATIENT
Start: 2018-11-12 | End: 2018-11-13 | Stop reason: HOSPADM

## 2018-11-12 RX ORDER — SODIUM CHLORIDE 0.9 % (FLUSH) 0.9 %
5-10 SYRINGE (ML) INJECTION AS NEEDED
Status: DISCONTINUED | OUTPATIENT
Start: 2018-11-12 | End: 2018-11-13 | Stop reason: HOSPADM

## 2018-11-12 RX ORDER — NITROGLYCERIN 0.4 MG/1
0.4 TABLET SUBLINGUAL
Status: DISCONTINUED | OUTPATIENT
Start: 2018-11-12 | End: 2018-11-13 | Stop reason: HOSPADM

## 2018-11-12 RX ORDER — IODIXANOL 320 MG/ML
INJECTION, SOLUTION INTRAVASCULAR AS NEEDED
Status: DISCONTINUED | OUTPATIENT
Start: 2018-11-12 | End: 2018-11-12 | Stop reason: HOSPADM

## 2018-11-12 RX ORDER — ATROPINE SULFATE 0.1 MG/ML
INJECTION INTRAVENOUS
Status: COMPLETED
Start: 2018-11-12 | End: 2018-11-12

## 2018-11-12 RX ORDER — OXYCODONE AND ACETAMINOPHEN 5; 325 MG/1; MG/1
1 TABLET ORAL
Status: DISCONTINUED | OUTPATIENT
Start: 2018-11-12 | End: 2018-11-13 | Stop reason: HOSPADM

## 2018-11-12 RX ORDER — KETOROLAC TROMETHAMINE 30 MG/ML
15 INJECTION, SOLUTION INTRAMUSCULAR; INTRAVENOUS AS NEEDED
Status: DISCONTINUED | OUTPATIENT
Start: 2018-11-12 | End: 2018-11-12 | Stop reason: HOSPADM

## 2018-11-12 RX ORDER — SODIUM CHLORIDE 9 MG/ML
1 INJECTION, SOLUTION INTRAVENOUS AS NEEDED
Status: DISPENSED | OUTPATIENT
Start: 2018-11-12 | End: 2018-11-13

## 2018-11-12 RX ORDER — SODIUM CHLORIDE 9 MG/ML
3 INJECTION, SOLUTION INTRAVENOUS ONCE
Status: COMPLETED | OUTPATIENT
Start: 2018-11-12 | End: 2018-11-12

## 2018-11-12 RX ORDER — MIDAZOLAM HYDROCHLORIDE 1 MG/ML
2 INJECTION, SOLUTION INTRAMUSCULAR; INTRAVENOUS
Status: COMPLETED | OUTPATIENT
Start: 2018-11-12 | End: 2018-11-12

## 2018-11-12 RX ORDER — PROPOFOL 10 MG/ML
INJECTION, EMULSION INTRAVENOUS AS NEEDED
Status: DISCONTINUED | OUTPATIENT
Start: 2018-11-12 | End: 2018-11-12 | Stop reason: HOSPADM

## 2018-11-12 RX ADMIN — HYDROMORPHONE HYDROCHLORIDE 0.5 MG: 2 INJECTION, SOLUTION INTRAMUSCULAR; INTRAVENOUS; SUBCUTANEOUS at 11:11

## 2018-11-12 RX ADMIN — PROPOFOL 20 MG: 10 INJECTION, EMULSION INTRAVENOUS at 10:01

## 2018-11-12 RX ADMIN — PROTAMINE SULFATE 10 MG: 10 INJECTION, SOLUTION INTRAVENOUS at 10:11

## 2018-11-12 RX ADMIN — PROTAMINE SULFATE 10 MG: 10 INJECTION, SOLUTION INTRAVENOUS at 10:10

## 2018-11-12 RX ADMIN — PROPOFOL 75 MCG/KG/MIN: 10 INJECTION, EMULSION INTRAVENOUS at 09:04

## 2018-11-12 RX ADMIN — Medication 10 ML: at 21:43

## 2018-11-12 RX ADMIN — PROTAMINE SULFATE 10 MG: 10 INJECTION, SOLUTION INTRAVENOUS at 10:14

## 2018-11-12 RX ADMIN — Medication 2 G: at 09:06

## 2018-11-12 RX ADMIN — HYDROMORPHONE HYDROCHLORIDE 0.5 MG: 2 INJECTION, SOLUTION INTRAMUSCULAR; INTRAVENOUS; SUBCUTANEOUS at 11:03

## 2018-11-12 RX ADMIN — OXYCODONE AND ACETAMINOPHEN 1 TABLET: 5; 325 TABLET ORAL at 21:42

## 2018-11-12 RX ADMIN — SODIUM CHLORIDE, SODIUM LACTATE, POTASSIUM CHLORIDE, AND CALCIUM CHLORIDE 100 ML/HR: 600; 310; 30; 20 INJECTION, SOLUTION INTRAVENOUS at 06:45

## 2018-11-12 RX ADMIN — PROTAMINE SULFATE 5 MG: 10 INJECTION, SOLUTION INTRAVENOUS at 10:08

## 2018-11-12 RX ADMIN — SODIUM CHLORIDE 1 ML/KG/HR: 900 INJECTION, SOLUTION INTRAVENOUS at 07:49

## 2018-11-12 RX ADMIN — LIDOCAINE HYDROCHLORIDE 60 MG: 20 INJECTION, SOLUTION EPIDURAL; INFILTRATION; INTRACAUDAL; PERINEURAL at 09:04

## 2018-11-12 RX ADMIN — PROTAMINE SULFATE 5 MG: 10 INJECTION, SOLUTION INTRAVENOUS at 10:12

## 2018-11-12 RX ADMIN — MIDAZOLAM HYDROCHLORIDE 2 MG: 2 INJECTION, SOLUTION INTRAMUSCULAR; INTRAVENOUS at 08:10

## 2018-11-12 RX ADMIN — SODIUM CHLORIDE 3 ML/KG/HR: 900 INJECTION, SOLUTION INTRAVENOUS at 06:47

## 2018-11-12 RX ADMIN — Medication 12 MG: at 16:00

## 2018-11-12 RX ADMIN — HEPARIN SODIUM 5000 UNITS: 1000 INJECTION, SOLUTION INTRAVENOUS; SUBCUTANEOUS at 09:28

## 2018-11-12 RX ADMIN — LABETALOL HYDROCHLORIDE 5 MG: 5 INJECTION, SOLUTION INTRAVENOUS at 10:13

## 2018-11-12 RX ADMIN — ATROPINE SULFATE 0.5 MG: 0.1 INJECTION, SOLUTION INTRAVENOUS at 11:15

## 2018-11-12 RX ADMIN — SODIUM CHLORIDE 75 ML/HR: 900 INJECTION, SOLUTION INTRAVENOUS at 17:24

## 2018-11-12 RX ADMIN — ACETAMINOPHEN 1000 MG: 500 TABLET, FILM COATED ORAL at 07:24

## 2018-11-12 RX ADMIN — PROPOFOL 30 MG: 10 INJECTION, EMULSION INTRAVENOUS at 09:04

## 2018-11-12 RX ADMIN — Medication 15 MG: at 11:23

## 2018-11-12 RX ADMIN — SODIUM CHLORIDE, SODIUM LACTATE, POTASSIUM CHLORIDE, AND CALCIUM CHLORIDE: 600; 310; 30; 20 INJECTION, SOLUTION INTRAVENOUS at 10:46

## 2018-11-12 NOTE — PROGRESS NOTES
Patient status post aortogram bilateral iliac stents. Postprocedure patient had episode of bradycardia and hypotension, which responded to medication treatment. Hemoglobin stable at 12.0, right groin soft no expanding hematoma. Will admit the patient overnight for serial CBCs and IV hydration most likely discharge in the morning. Andre Class

## 2018-11-12 NOTE — PROGRESS NOTES
Spiritual Care visit. Initial Visit, Pre Surgery Consult. Visit and prayer before patient goes to surgery.  affirmed the jeevan that was expressed by patient. Visit by Tenzin Jackson M.Ed., Th.B. ,Staff

## 2018-11-12 NOTE — ANESTHESIA POSTPROCEDURE EVALUATION
Procedure(s): ULTRASOUND GUIDED ACCESS X2 AORTAGRAM  BILATERAL ILIAC ANGIOGRAM  WITH PTA AND STENT. Elmer Kim Anesthesia Post Evaluation Patient location during evaluation: PACU Patient participation: complete - patient participated Level of consciousness: responsive to verbal stimuli Pain management: adequate Airway patency: patent Anesthetic complications: no 
Cardiovascular status: acceptable Respiratory status: acceptable Hydration status: euvolemic Visit Vitals /61 Pulse (!) 56 Temp 37.1 °C (98.7 °F) Resp 16 Ht 5' (1.524 m) Wt 80.3 kg (177 lb) SpO2 97% Breastfeeding? No  
BMI 34.57 kg/m²

## 2018-11-12 NOTE — BRIEF OP NOTE
[address], [address] [facility]. [address] [place] [address] FAX: [phone] Op Note Template Note Pre-Op Diagnosis: Peripheral vascular disease, unspecified ([address] [I73.9] Post-Op Diagnosis:  Peripheral vascular disease, unspecified ([address] [I73.9] Procedures: Procedure(s): ULTRASOUND GUIDED ACCESS X2 AORTAGRAM  BILATERAL ILIAC ANGIOGRAM  WITH PTA AND STENT. Surgeon: Karl Antoine MD 
 
Assistants: Surgeon(s): Cee Lara MD   
 
Anesthesia:  General  
 
Findings: Distal aortic stenosis proximal stenosis and previously iliac stents balloon angioplasty and stented with 9 x 38 I-Cath covered balloon stents, postdilated with 10 x 4 balloons Tourniquet Time:  * No tourniquets in log * Estimated Blood Loss:       
      
Specimens: None Implants:   
Implant Name Type Inv. Item Serial No.  Lot No. LRB No. Used Action STENT TRACH BLLN [id number] -- ICAST CVR - [id number] Stent STENT TRACH BLLN [id number] -- ICAST CVR [id number] [employer] CARDIOVASCLR  N/A 1 Implanted STENT TRACH BLLN [id number] -- ICAST CVR - [id number] Stent STENT [facility] BLLN [id number] -- ICAST CVR [id number] [employer] CARDIOVASCLR  N/A 1 Implanted Complications: None Signed: Karl Antoine MD 
   
Elements of this note have been dictated using speech recognition software. As a result, errors of speech recognition may have occurred.

## 2018-11-12 NOTE — OP NOTES
[facility] REPORT    Priyanka Rivera  MR#: [id number]  : 1950  ACCOUNT #: [de-identified]   DATE OF SERVICE: 2018    CLINICAL SERVICE:  Vascular surgery. PREOPERATIVE DIAGNOSIS:  Severe distal aortic and in-stent iliac stent. POSTOPERATIVE DIAGNOSIS:   Severe distal aortic and in-stent iliac stent. PROCEDURES PERFORMED:  1.  Bilateral ultrasound-guided access of common femoral artery with placement of catheter and aortogram.  2.  Bilateral iliac angioplasty and stent with a 9 x 38 iCAST balloon postdilated with a 10 x 40 balloon. SURGEON:  Dr. Jeremías Kiser. ASSISTANT:      ANESTHESIA:  Sedation. ESTIMATED BLOOD LOSS:     SPECIMENS REMOVED:      COMPLICATIONS:      IMPLANTS:      OPERATIVE FINDINGS:  1. There was significant distal aortic stenosis, probably greater than 60% just above the bifurcation. 2.  Bilateral iliac stents were patent with a proximal high-grade stenosis. 3.  Hypogastric and external iliac arteries all patent without any significant disease. INDICATIONS FOR PROCEDURE:  This is a 65-year-old female with history of peripheral vascular disease who had bilateral iliac stents placed by the cardiologist several years back. She underwent a CT scan with worsening back pain, associated with some significant distal stenosis in the iliac stent. We decided to do address endovascular. PROCEDURE IN DETAIL:  After giving informed consent, the patient was brought to the operating room. General anesthesia was then induced. Preoperative antibiotics were given before skin incision. Bilateral groin was all prepped and draped in normal sterile fashion. Ultrasound-guided access was used to access both common femoral arteries bilaterally using a micropuncture technique. We upsized to a 7-Barbadian sheath over an 0.035 starter wire.   We did a digital subtraction from the groin sheath showing that we had high-grade stenosis bilaterally. We were able to get our Glidewire up through both stent into the distal aorta and we confirmed we were intraluminal with arteriogram.  Omniflush catheter did aortogram confirming there was a margie in the area of the distal aorta, which was highly significant atherosclerotic disease and plaque proximally approaching greater than 60%. We then first dilated after giving 5000 units of heparin, dilated the area with a 9 x 4 kissing balloon. The location of the previously stent was right at the proximal bifurcation. We decided to extend the bilateral bifurcation brought to fill a 9 x 38 iCAST balloon expanding stents will be placed about half of that into the distal aorta. We ballooned both up to nominal pressure, which would get about 9.3 diameter luminal.  We post-dilated yet again with a 10 x 4 balloon. There still was some hyaline stenosis in the right iliac and the distal aorta that we saw from the hand injection. We then again with a kissing balloon brought to a 10 x 2 balloon, which we balloon angioplastied the proximal right common iliac in-stent stenosis to nominal pressure. Once we did that, we were happy with the procedure. We then did a sheath shot confirming that access was adequate for Mynx device and we closed after given 40 mg of protamine. The patient was extubated and taken to the PACU in stable condition.       MD LUZ ELENA Aguila / TN  D: 11/12/2018 10:36     T: 11/12/2018 11:12  JOB #: [id number]

## 2018-11-12 NOTE — ANESTHESIA PREPROCEDURE EVALUATION
Anesthetic History Comments: Slow emergence Review of Systems / Medical History Patient summary reviewed, nursing notes reviewed and pertinent labs reviewed Pulmonary COPD: mild Sleep apnea: No treatment Smoker (quit 12 yrs ago) Neuro/Psych Cardiovascular Hypertension: well controlled CAD (15 yrs s/p MI), PAD (6 yrs s/p angioplasty. Now w severe distal aortic stenosis; critical R common iliac stenosis) and CABG (2011) Exercise tolerance: <4 METS Comments: Nuclear perfusion study July 2018:  Normal LV perfusion w EF 64% GI/Hepatic/Renal 
  
GERD Renal disease: CRI Endo/Other Obesity and arthritis Other Findings Physical Exam 
 
Airway Mallampati: I 
TM Distance: 4 - 6 cm Neck ROM: normal range of motion Mouth opening: Normal 
 
 Cardiovascular Regular rate and rhythm,  S1 and S2 normal,  no murmur, click, rub, or gallop Dental 
No notable dental hx Pulmonary Breath sounds clear to auscultation Abdominal 
GI exam deferred Other Findings Anesthetic Plan ASA: 3 Anesthesia type: general 
 
 
 
 
 
Anesthetic plan and risks discussed with: Patient and Spouse

## 2018-11-12 NOTE — PROGRESS NOTES
11:15 AM 
 Pt's HR decreased from 50-60's to 30's. BP decreased from 's to 100's. MD's at bedside. Atropine and ephedrine given. IVF on pressure bag initiated. STAT CBC drawn and sent to lab. Groin assessed by MDs- no hematoma/bleeding 11:38 AM 
HR 82. /61. Groin sites assessed- no hematoma/bleeding

## 2018-11-12 NOTE — PROGRESS NOTES
Care Management Interventions PCP Verified by CM: Yes 
Palliative Care Criteria Met (RRAT>21 & CHF Dx)?: No(Dx S/P Angio RRAT 7) Transition of Care Consult (CM Consult): Discharge Planning Discharge Durable Medical Equipment: No 
Physical Therapy Consult: No 
Occupational Therapy Consult: No 
Speech Therapy Consult: No 
Current Support Network: Lives with Spouse Confirm Follow Up Transport: Self Plan discussed with Pt/Family/Caregiver: Yes Freedom of Choice Offered: Yes Discharge Location Discharge Placement: Home Met with patient for d/c planning. Patient alert and oriented x 3, independent of ADL's and lives with her . Patient requires no DME at home and is able to drive. She has help with her  and daughter if needed when d/c. She has Care Improvement Plus and able to obtain medications at [facility] on [facility]. Patient voices no concerns or needs for d/c. D/C plan is home with  when medically ready.

## 2018-11-12 NOTE — PERIOP NOTES
TRANSFER - OUT REPORT: 
 
Verbal report given to Temitope Storey RN(name) on Marielle Frost  being transferred to [facility]234(unit) for routine post - op Report consisted of patients Situation, Background, Assessment and  
Recommendations(SBAR). Information from the following report(s) SBAR, Kardex, OR Summary, Procedure Summary, Intake/Output, MAR, Recent Results and Cardiac Rhythm SB was reviewed with the receiving nurse. Lines:  
Peripheral IV 11/12/18 Right Hand (Active) Site Assessment Clean, dry, & intact 11/12/2018 12:07 PM  
Phlebitis Assessment 0 11/12/2018 12:07 PM  
Infiltration Assessment 0 11/12/2018 12:07 PM  
Dressing Status Clean, dry, & intact 11/12/2018 12:07 PM  
Dressing Type Transparent;Tape 11/12/2018 12:07 PM  
Hub Color/Line Status Pink; Infusing 11/12/2018 12:07 PM  
Alcohol Cap Used No 11/12/2018 10:59 AM  
   
Peripheral IV 11/12/18 Right Antecubital (Active) Site Assessment Clean, dry, & intact 11/12/2018 12:07 PM  
Phlebitis Assessment 0 11/12/2018 12:07 PM  
Infiltration Assessment 0 11/12/2018 12:07 PM  
Dressing Status Clean, dry, & intact 11/12/2018 12:07 PM  
Dressing Type Transparent;Tape 11/12/2018 12:07 PM  
Hub Color/Line Status Pink;Flushed;Capped 11/12/2018 12:07 PM  
  
 
Opportunity for questions and clarification was provided. Patient transported with: 
 Monitor O2 @ 3LNC liters Registered Nurse VTE prophylaxis orders have not been written for Marielle Frost. Patient and family given floor number and nurses name. Family updated re: pt status after security code verified.

## 2018-11-13 VITALS
RESPIRATION RATE: 16 BRPM | OXYGEN SATURATION: 93 % | SYSTOLIC BLOOD PRESSURE: 151 MMHG | TEMPERATURE: 98.6 F | WEIGHT: 178.6 LBS | HEIGHT: 60 IN | HEART RATE: 61 BPM | DIASTOLIC BLOOD PRESSURE: 64 MMHG | BODY MASS INDEX: 35.06 KG/M2

## 2018-11-13 LAB
ANION GAP SERPL CALC-SCNC: 5 MMOL/L (ref 7–16)
BUN SERPL-MCNC: 8 MG/DL (ref 8–23)
CALCIUM SERPL-MCNC: 8 MG/DL (ref 8.3–10.4)
CHLORIDE SERPL-SCNC: 108 MMOL/L (ref 98–107)
CO2 SERPL-SCNC: 28 MMOL/L (ref 21–32)
CREAT SERPL-MCNC: 0.8 MG/DL (ref 0.6–1)
ERYTHROCYTE [DISTWIDTH] IN BLOOD BY AUTOMATED COUNT: 12.3 %
GLUCOSE SERPL-MCNC: 86 MG/DL (ref 65–100)
HCT VFR BLD AUTO: 34.5 % (ref 35.8–46.3)
HGB BLD-MCNC: 11.1 G/DL (ref 11.7–15.4)
MCH RBC QN AUTO: 30.7 PG (ref 26.1–32.9)
MCHC RBC AUTO-ENTMCNC: 32.2 G/DL (ref 31.4–35)
MCV RBC AUTO: 95.3 FL (ref 79.6–97.8)
NRBC # BLD: 0 K/UL (ref 0–0.2)
PLATELET # BLD AUTO: 171 K/UL (ref 150–450)
PMV BLD AUTO: 9.5 FL (ref 9.4–12.3)
POTASSIUM SERPL-SCNC: 4.1 MMOL/L (ref 3.5–5.1)
RBC # BLD AUTO: 3.62 M/UL (ref 4.05–5.2)
SODIUM SERPL-SCNC: 141 MMOL/L (ref 136–145)
WBC # BLD AUTO: 5.8 K/UL (ref 4.3–11.1)

## 2018-11-13 PROCEDURE — 90471 IMMUNIZATION ADMIN: CPT

## 2018-11-13 PROCEDURE — 36415 COLL VENOUS BLD VENIPUNCTURE: CPT

## 2018-11-13 PROCEDURE — 80048 BASIC METABOLIC PNL TOTAL CA: CPT

## 2018-11-13 PROCEDURE — 90686 IIV4 VACC NO PRSV 0.5 ML IM: CPT | Performed by: SURGERY

## 2018-11-13 PROCEDURE — 74011250636 HC RX REV CODE- 250/636: Performed by: SURGERY

## 2018-11-13 PROCEDURE — 85027 COMPLETE CBC AUTOMATED: CPT

## 2018-11-13 PROCEDURE — 74011250637 HC RX REV CODE- 250/637: Performed by: SURGERY

## 2018-11-13 RX ORDER — ACETAMINOPHEN 325 MG/1
650 TABLET ORAL
Status: DISCONTINUED | OUTPATIENT
Start: 2018-11-13 | End: 2018-11-13 | Stop reason: HOSPADM

## 2018-11-13 RX ADMIN — Medication 10 ML: at 05:52

## 2018-11-13 RX ADMIN — ESCITALOPRAM OXALATE 10 MG: 10 TABLET ORAL at 09:05

## 2018-11-13 RX ADMIN — INFLUENZA VIRUS VACCINE 0.5 ML: 15; 15; 15; 15 SUSPENSION INTRAMUSCULAR at 09:48

## 2018-11-13 RX ADMIN — ACETAMINOPHEN 650 MG: 325 TABLET, FILM COATED ORAL at 09:05

## 2018-11-13 NOTE — PROGRESS NOTES
Discharge instructions, follow up appointments and prescriptions reviewed with patient and family. Both verbalize understanding. All personal belongings taken with patient. Family member will drive patient home. Patient escorted to discharge area via wheelchair. Patient is stable at discharge. PIV and monitor removed.

## 2018-11-13 NOTE — PROGRESS NOTES
Bedside and Verbal shift change report given to [place] [facility] (oncoming nurse) by self (offgoing nurse). Report included the following information SBAR, Kardex, Intake/Output, MAR and Recent Results.

## 2018-11-13 NOTE — PROGRESS NOTES
Bedside and Verbal shift change report given to self (oncoming nurse) by Yenni Barillas RN (offgoing nurse). Report included the following information SBAR, Kardex, MAR and Recent Results.  \

## 2018-11-13 NOTE — PROGRESS NOTES
Bedside report received from [facility] . Opportunity for questions, concerns. Patient involved.

## 2018-11-13 NOTE — PROGRESS NOTES
Site care performed to bilat. Groins. Removed old dressing no drainage noted. More Ecchymosis noted to right groin than left. Tissue soft no hematoma noted. Bilat cleaned with CHG and dry folded 4x4 applied. Secured with paper tape. Education reviewed with patient about groin care when home. Pt verbalized understanding.

## 2018-11-13 NOTE — DISCHARGE INSTRUCTIONS
Angiogram: What to Expect at Home  Your Recovery  An angiogram is an X-ray test that uses dye and a camera to take pictures of the blood flow in an artery or a vein. The doctor inserted a thin, flexible tube (catheter) into a blood vessel in your groin. In some cases, the catheter is placed in a blood vessel in the arm. An angiogram is done for many reasons. For example, you may have an angiogram to find the source of bleeding, such as an ulcer. Or it may be done to look for blocked blood vessels in your lungs. After an angiogram, your groin or arm may have a bruise and feel sore for a day or two. You can do light activities around the house but nothing strenuous for several days. Your doctor may give you specific instructions on when you can do your normal activities again, such as driving and going back to work. This care sheet gives you a general idea about how long it will take for you to recover. But each person recovers at a different pace. Follow the steps below to feel better as quickly as possible. How can you care for yourself at home? Activity    · Do not do strenuous exercise and do not lift, pull, or push anything heavy until your doctor says it is okay. This may be for a day or two. You can walk around the house and do light activity, such as cooking.     · You may shower 24 to 48 hours after the procedure, if your doctor okays it. Pat the incision dry. Do not take a bath for 1 week, or until your doctor tells you it is okay.     · If the catheter was placed in your groin, try not to walk up stairs for the first couple of days.     · If the catheter was placed in your arm near your wrist, do not bend your wrist deeply for the first couple of days. Be careful using your hand to get into and out of a chair or bed.     · If your doctor recommends it, get more exercise. Walking is a good choice. Bit by bit, increase the amount you walk every day. Try for at least 30 minutes on most days of the week. Diet    · Drink plenty of fluids to help your body flush out the dye. If you have kidney, heart, or liver disease and have to limit fluids, talk with your doctor before you increase the amount of fluids you drink.     · You can eat your normal diet. If your stomach is upset, try bland, low-fat foods like plain rice, broiled chicken, toast, and yogurt. Medicines    · Be safe with medicines. Read and follow all instructions on the label. ? If the doctor gave you a prescription medicine for pain, take it as prescribed. ? If you are not taking a prescription pain medicine, ask your doctor if you can take an over-the-counter medicine.     · If you take blood thinners, such as warfarin (Coumadin), clopidogrel (Plavix), or aspirin, be sure to talk to your doctor. He or she will tell you if and when to start taking those medicines again. Make sure that you understand exactly what your doctor wants you to do.     · Your doctor will tell you if and when you can restart your medicines. He or she will also give you instructions about taking any new medicines.    Care of the catheter site    · You will have a dressing over the cut (incision). A dressing helps the incision heal and protects it. Your doctor will tell you how to take care of this.     · Put ice or a cold pack on the area for 10 to 20 minutes at a time to help with soreness or swelling. Put a thin cloth between the ice and your skin. Follow-up care is a key part of your treatment and safety. Be sure to make and go to all appointments, and call your doctor if you are having problems. It's also a good idea to know your test results and keep a list of the medicines you take. When should you call for help? Call 911 anytime you think you may need emergency care.  For example, call if:    · You passed out (lost consciousness).     · You have severe trouble breathing.     · You have sudden chest pain and shortness of breath, or you cough up blood.    Call your doctor now or seek immediate medical care if:    · You are bleeding from the area where the catheter was put in your artery.     · You have a fast-growing, painful lump at the catheter site.     · You have signs of infection, such as:  ? Increased pain, swelling, warmth, or redness. ? Red streaks leading from the incision. ? Pus draining from the incision. ? A fever.    Watch closely for any changes in your health, and be sure to contact your doctor if:    · You don't get better as expected. Where can you learn more? Go to http://purnima-maritza.info/. Enter O020 in the search box to learn more about \"Angiogram: What to Expect at Home. \"  Current as of: September 10, 2017  Content Version: 11.8  © 4173-9439 [employer]. Care instructions adapted under license by [employer] (which disclaims liability or warranty for this information). If you have questions about a medical condition or this instruction, always ask your healthcare professional. [place] [address] any warranty or liability for your use of this information. DISCHARGE SUMMARY from Nurse    PATIENT INSTRUCTIONS:    After general anesthesia or intravenous sedation, for 24 hours or while taking prescription Narcotics:  · Limit your activities  · Do not drive and operate hazardous machinery  · Do not make important personal or business decisions  · Do  not drink alcoholic beverages  · If you have not urinated within 8 hours after discharge, please contact your surgeon on call.     Report the following to your surgeon:  · Excessive pain, swelling, redness or odor of or around the surgical area  · Temperature over 100.5  · Nausea and vomiting lasting longer than 4 hours or if unable to take medications  · Any signs of decreased circulation or nerve impairment to extremity: change in color, persistent  numbness, tingling, coldness or increase pain  · Any questions    What to do at Home:    * Please give a list of your current medications to your Primary Care Provider. *  Please update this list whenever your medications are discontinued, doses are      changed, or new medications (including over-the-counter products) are added. *  Please carry medication information at all times in case of emergency situations. These are general instructions for a healthy lifestyle:    No smoking/ No tobacco products/ Avoid exposure to second hand smoke  Surgeon General's Warning:  Quitting smoking now greatly reduces serious risk to your health. Obesity, smoking, and sedentary lifestyle greatly increases your risk for illness    A healthy diet, regular physical exercise & weight monitoring are important for maintaining a healthy lifestyle    You may be retaining fluid if you have a history of heart failure or if you experience any of the following symptoms:  Weight gain of 3 pounds or more overnight or 5 pounds in a week, increased swelling in our hands or feet or shortness of breath while lying flat in bed. Please call your doctor as soon as you notice any of these symptoms; do not wait until your next office visit. Recognize signs and symptoms of STROKE:    F-face looks uneven    A-arms unable to move or move unevenly    S-speech slurred or non-existent    T-time-call 911 as soon as signs and symptoms begin-DO NOT go       Back to bed or wait to see if you get better-TIME IS BRAIN. Warning Signs of HEART ATTACK     Call 911 if you have these symptoms:   Chest discomfort. Most heart attacks involve discomfort in the center of the chest that lasts more than a few minutes, or that goes away and comes back. It can feel like uncomfortable pressure, squeezing, fullness, or pain.  Discomfort in other areas of the upper body. Symptoms can include pain or discomfort in one or both arms, the back, neck, jaw, or stomach.  Shortness of breath with or without chest discomfort.    Other signs may include breaking out in a cold sweat, nausea, or lightheadedness. Don't wait more than five minutes to call 911 - MINUTES MATTER! Fast action can save your life. Calling 911 is almost always the fastest way to get lifesaving treatment. Emergency Medical Services staff can begin treatment when they arrive -- up to an hour sooner than if someone gets to the hospital by car. The discharge information has been reviewed with the patient and spouse. The patient and spouse verbalized understanding. Discharge medications reviewed with the patient and spouse and appropriate educational materials and side effects teaching were provided.   ___________________________________________________________________________________________________________________________________

## 2018-11-13 NOTE — PROGRESS NOTES
[address], [address] [place] [address]. [address] [place] [address] FAX: [phone] VASCULAR SURGERY FLOOR PROGRESS NOTE Admit Date: 2018 POD: 1 Day Post-Op Procedure:  Procedure(s): ULTRASOUND GUIDED ACCESS X2 AORTAGRAM  BILATERAL ILIAC ANGIOGRAM  WITH PTA AND STENT. Subjective:  
 
Patient has no new complaints. Objective:  
 
Vitals: 
Blood pressure 128/60, pulse 64, temperature 98.7 °F (37.1 °C), resp. rate 16, height 5' (1.524 m), weight 178 lb 9.6 oz (81 kg), SpO2 93 %, not currently breastfeeding. Temp (24hrs), Av.2 °F (36.8 °C), Min:97.6 °F (36.4 °C), Max:98.7 °F (37.1 °C) Intake / Output: 
 
Intake/Output Summary (Last 24 hours) at 2018 0703 Last data filed at 2018 8360 Gross per 24 hour Intake 2720 ml Output 2055 ml Net 665 ml Physical Exam:   
Constitutional: she appears well-developed. No distress. HENT:  
Head: Atraumatic. Eyes: Pupils are equal, round, and reactive to light. Neck: Normal range of motion. Cardiovascular: Regular rhythm. Pulmonary/Chest: Effort normal and breath sounds normal. No respiratory distress. Abdominal: Soft. Bowel sounds are normal. she exhibits no distension. There is no tenderness. There is no guarding. No hernia. Musculoskeletal: Normal range of motion. Neurological: She is alert. CN II- XII grossly intact Vascular: groin stable Labs:  
Recent Labs 18 
0445 18 
1558 HGB 11.1* 12.1 WBC 5.8 9.3 K 4.1  --   
GLU 86  --   
 
 
Data Review Assessment:  
 
Patient Active Problem List  
 Diagnosis Date Noted  PVD (peripheral vascular disease) with claudication ([address] 2018  Severe obesity (BMI 35.0-39.9) 2018  Extremity atherosclerosis with intermittent claudication ([address] 10/13/2015  Angina pectoris ([address] 10/13/2015  Pleural effusion 10/13/2015  Dyspnea/Shortness of breath 10/13/2015  LVH (left ventricular hypertrophy) (Cardiomegaly)  10/13/2015  Coronary atherosclerosis of native coronary artery 10/13/2015  Tobacco use disorder 10/13/2015  S/P peripheral artery angioplasty 06/29/2012  Peripheral arterial disease ([address] 06/29/2012  Dyslipidemia 05/27/2011  Chest pain, unspecified 05/27/2011  Hypertension 05/27/2011  Hx of CABG 05/27/2011  Coronary artery disease 05/27/2011  S/P coronary artery stent placement  05/27/2011 Plan/Recommendations/Medical Decision Making:  
 
hbg stable, palpable pedal pulses D/c home follow up 6 weeks Elements of this note have been dictated using speech recognition software. As a result, errors of speech recognition may have occurred.

## 2020-11-08 NOTE — PERIOP NOTES
Gastroenterology Progress Note     Patient:  [place] Charles Date:  11/8/2020   YOB: 1965 Admission Date:  11/6/2020   MRN:  [id number] Attending:  Amie Tejada MD     Current hospital day:  Hospital Day: 3    Consulted for:  Acute pancreatitis    Brief hospital course:  The patient is a 55 year old male who is being seen by GI for acute pancreatitis. This patient is a 55-year-old male with history of alcohol abuse resents to the emergency department with 4 day history of abdominal pain, nausea, and vomiting.  He notes that his symptoms began 4 days ago with some epigastric abdominal discomfort and nonbloody nonbilious emesis with streaks of red in it.  For his abdominal pain, he took 4-5 pills of ibuprofen 800 mg daily with little improvement in symptoms.  The epigastric pain worsened, and he continued to have emesis with each attempt at p.o. intake, so he came to the emergency department. Patient denies any history of abdominal surgeries.  He he admits to drinking excessive amounts daily, with last drink being earlier yesterday.  He also smokes tobacco daily.  He denies any family history of colon cancer, pancreatic cancer, or liver abnormality.    Subjective:   The patient reports minimal abdominal pain this morning.  He is feeling much better overall and asking for food.  He is craving eggs.  He is unsure of where he may have contracted COVID from.  Last stool occurrence:       Reviewed Pertinent:   Allergies, Medical History, Surgical History, Social History, Family History and Medications.    Objective:   Blood pressure (!) 154/86, pulse 100, temperature 98.9 °F (37.2 °C), temperature source Oral, resp. rate (!) 22, height 6' (1.829 m), weight 102.3 kg, SpO2 95 %.  General:  The patient is well developed, well nourished,  obese male in no acute distress, and appears as stated age  Head:  Atraumatic, normocephalic  Eyes:  No scleral icterus.  Extraocular movements intact  Skin:  Warm and dry, no  Repeat CBC for 1600 drawn and sent to lab jaundice  Cardiovascular:  Regular rate and rhythm with no murmurs, rubs, or gallops.  Abdomen:  Soft and non tender, normal bowel sounds with no hepatomegaly or splenomegaly     LABORATORY DATA:  Lab Results   Component Value Date    SODIUM 131 (L) 11/08/2020    SODIUM 132 (L) 11/08/2020    POTASSIUM 3.8 11/08/2020    POTASSIUM 3.8 11/08/2020    CHLORIDE 98 11/08/2020    CHLORIDE 98 11/08/2020    CO2 26 11/08/2020    CO2 24 11/08/2020    GLUCOSE 96 11/08/2020    GLUCOSE 93 11/08/2020    BUN 13 11/08/2020    BUN 13 11/08/2020    CREATININE 0.63 (L) 11/08/2020    CREATININE 0.56 (L) 11/08/2020    ALBUMIN 2.6 (L) 11/08/2020    BILIRUBIN 0.4 11/08/2020    LACTA 1.0 02/09/2020    AST 25 11/08/2020    INR 0.9 11/06/2020     Lab Results   Component Value Date    WBC 13.4 (H) 11/07/2020    HGB 14.6 11/07/2020    HCT 40.0 11/07/2020     (L) 11/07/2020    RAPDTR <0.02 11/08/2020    CPK 43 11/08/2020    NH3 16 02/11/2020    CRP 29.0 (H) 11/08/2020    TSH 1.897 12/01/2016    BRISA 26 11/07/2020    UOSM 572 11/07/2020       INPATIENT MEDICATIONS:  • erythromycin   Both Eyes 4x Daily   • piperacillin-tazobactam (ZOSYN) extended interval IVPB  3.375 g Intravenous 3 times per day   • ipratropium-albuterol  3 mL Nebulization 4x Daily Resp   • polyethylene glycol  17 g Oral Daily   • acamprosate  333 mg Oral TID   • amLODIPine  10 mg Oral Daily   • cloNIDine  0.1 mg Oral 2 times per day   • losartan  100 mg Oral Daily   • melatonin  3 mg Oral Nightly   • mirtazapine  7.5 mg Oral Nightly   • nicotine  1 patch Transdermal Daily   • sodium chloride (PF)  2 mL Intracatheter 2 times per day   • enoxaparin  40 mg Subcutaneous Daily   • folic acid  1 mg Oral Daily   • thiamine  100 mg Oral Daily   • vitamin - therapeutic multivitamins w/minerals  1 tablet Oral Daily   • Potassium Standard Replacement Protocol   Does not apply See Admin Instructions   • pantoprazole  40 mg Intravenous 2 times per day   • influenza virus quadrivalent  vaccine recombinant (FLUBLOK)(EGG FREE) injection  0.5 mL Intramuscular Once   CT abdomen pelvis (11/06/2020)  1. Extensive fat stranding about the pancreas consistent with pancreatitis. Poorly pronounced bone enhancement of the lateral body and tail is concerning for evolving necrotizing pancreatitis. This involves proximately one quarter of the total pancreatic parenchyma.  2. Severe hepatic steatosis with areas of suspected sparing near the gallbladder.  3. Splenic vein appears attenuated posterior to the pancreas but remains patent. Attention on follow-up.    ASSESSMENT:  The patient is a 55 year old male  1. Acute pancreatitis - epigastric abdominal pain with lipase 3900 and CT showing extensive fat stranding with concern for evolving necrotizing pancreatitis.  Suspect alcohol related.  No gallstones or biliary dilation on imaging.  Triglycerides within normal limits.  2. Hepatic steatosis, likely combination of YOU (BMI 31) and chronic alcohol use  3. Alcohol use disorder, last drink just prior to ED arrival   4. COVID positive, confirmed on 11/06    RECOMMENDATIONS:  1. IV fluid management deferred to primary given hyponatremia  2. CIWA protocol ongoing  3. Folic acid and thiamine  4. Start clear liquid diet today for lunch  5. Advised on complete alcohol cessation    Patient discussed with attending, Dr. Mariscal, who agrees with the above plan.    Law Hughes DO  Gastroenterology Fellow  Pager: [phone]  11/8/2020 11:35 AM  I was present with the fellow during the history and exam. I discussed the case with the fellow and agree with the findings and plan as documented in the fellow's note.  Supportive care. alcohol cessation    Jose L Mariscal MD

## 2022-03-19 PROBLEM — I73.9 PVD (PERIPHERAL VASCULAR DISEASE) WITH CLAUDICATION (HCC): Status: ACTIVE | Noted: 2018-11-12

## 2022-08-25 NOTE — PROGRESS NOTES
Caller: Jose Maria Judge    Relationship to patient: Self    Best call back number: [phone]    Patient is needing: [facility] DOES NOT ACCEPT PATIENTS INSURANCE.   PATIENT STATES HE THEN WENT TO [facility]. PATIENT STATES HE WILL NOT GO TO THAT FACILITY     PLEASE ADVISE            Hospitalist TRANSFER - IN REPORT: 
 
Verbal report received from MONO Tai(name) on [address]  being received from [employer]) for routine post - op Report consisted of patients Situation, Background, Assessment and  
Recommendations(SBAR). Information from the following report(s) SBAR, Kardex, Procedure Summary, Intake/Output, MAR and Cardiac Rhythm Sinus Keagan Star was reviewed with the receiving nurse. Opportunity for questions and clarification was provided. Assessment completed upon patients arrival to unit and care assumed. Dual nurse skin assessment performed. No areas of breakdown noted. Bilateral groin puncture sites soft. No bleeding noted. Some bruising noted to right groin site.

## 2022-09-27 ENCOUNTER — APPOINTMENT (OUTPATIENT)
Dept: GENERAL RADIOLOGY | Age: 72
End: 2022-09-27
Payer: MEDICARE

## 2022-09-27 ENCOUNTER — HOSPITAL ENCOUNTER (OUTPATIENT)
Age: 72
Setting detail: OBSERVATION
Discharge: HOME | End: 2022-09-28
Attending: EMERGENCY MEDICINE | Admitting: FAMILY MEDICINE
Payer: MEDICARE

## 2022-09-27 ENCOUNTER — HOSPITAL ENCOUNTER (EMERGENCY)
Dept: GENERAL RADIOLOGY | Age: 72
Discharge: HOME | End: 2022-09-30
Payer: MEDICARE

## 2022-09-27 ENCOUNTER — HOSPITAL ENCOUNTER (EMERGENCY)
Dept: CT IMAGING | Age: 72
Discharge: HOME | End: 2022-09-30
Payer: MEDICARE

## 2022-09-27 DIAGNOSIS — R26.81 GAIT INSTABILITY: ICD-10-CM

## 2022-09-27 DIAGNOSIS — R00.1 BRADYCARDIA: Primary | ICD-10-CM

## 2022-09-27 DIAGNOSIS — R42 EPISODIC LIGHTHEADEDNESS: ICD-10-CM

## 2022-09-27 DIAGNOSIS — R29.6 FREQUENT FALLS: ICD-10-CM

## 2022-09-27 DIAGNOSIS — M54.2 NECK PAIN: ICD-10-CM

## 2022-09-27 PROBLEM — E66.811 CLASS 1 OBESITY WITH ALVEOLAR HYPOVENTILATION, SERIOUS COMORBIDITY, AND BODY MASS INDEX (BMI) OF 32.0 TO 32.9 IN ADULT: Chronic | Status: ACTIVE | Noted: 2022-09-27

## 2022-09-27 PROBLEM — I73.9 PVD (PERIPHERAL VASCULAR DISEASE) WITH CLAUDICATION (HCC): Status: ACTIVE | Noted: 2018-11-12

## 2022-09-27 PROBLEM — E87.1 HYPONATREMIA: Status: ACTIVE | Noted: 2022-09-27

## 2022-09-27 PROBLEM — R29.898 WEAKNESS OF BOTH LOWER EXTREMITIES: Status: ACTIVE | Noted: 2022-09-27

## 2022-09-27 PROBLEM — G25.0 ESSENTIAL TREMOR: Chronic | Status: ACTIVE | Noted: 2022-09-27

## 2022-09-27 PROBLEM — G47.33 OSA (OBSTRUCTIVE SLEEP APNEA): Status: ACTIVE | Noted: 2022-09-27

## 2022-09-27 PROBLEM — F39 MOOD DISORDER (HCC): Chronic | Status: ACTIVE | Noted: 2022-09-27

## 2022-09-27 PROBLEM — E66.2 CLASS 1 OBESITY WITH ALVEOLAR HYPOVENTILATION, SERIOUS COMORBIDITY, AND BODY MASS INDEX (BMI) OF 32.0 TO 32.9 IN ADULT (HCC): Chronic | Status: ACTIVE | Noted: 2022-09-27

## 2022-09-27 LAB
ALBUMIN SERPL-MCNC: 3.7 G/DL (ref 3.2–4.6)
ALBUMIN/GLOB SERPL: 1 {RATIO} (ref 1.2–3.5)
ALP SERPL-CCNC: 77 U/L (ref 50–136)
ALT SERPL-CCNC: 25 U/L (ref 12–65)
ANION GAP SERPL CALC-SCNC: 6 MMOL/L (ref 4–13)
AST SERPL-CCNC: 16 U/L (ref 15–37)
BASOPHILS # BLD: 0 K/UL (ref 0–0.2)
BASOPHILS NFR BLD: 1 % (ref 0–2)
BILIRUB SERPL-MCNC: 0.5 MG/DL (ref 0.2–1.1)
BUN SERPL-MCNC: 16 MG/DL (ref 8–23)
CALCIUM SERPL-MCNC: 10.2 MG/DL (ref 8.3–10.4)
CHLORIDE SERPL-SCNC: 102 MMOL/L (ref 101–110)
CO2 SERPL-SCNC: 26 MMOL/L (ref 21–32)
CREAT SERPL-MCNC: 1 MG/DL (ref 0.6–1)
DIFFERENTIAL METHOD BLD: ABNORMAL
EOSINOPHIL # BLD: 0.2 K/UL (ref 0–0.8)
EOSINOPHIL NFR BLD: 2 % (ref 0.5–7.8)
ERYTHROCYTE [DISTWIDTH] IN BLOOD BY AUTOMATED COUNT: 12.2 % (ref 11.9–14.6)
GLOBULIN SER CALC-MCNC: 3.6 G/DL (ref 2.3–3.5)
GLUCOSE SERPL-MCNC: 93 MG/DL (ref 65–100)
HCT VFR BLD AUTO: 44.5 % (ref 35.8–46.3)
HGB BLD-MCNC: 15.2 G/DL (ref 11.7–15.4)
IMM GRANULOCYTES # BLD AUTO: 0 K/UL (ref 0–0.5)
IMM GRANULOCYTES NFR BLD AUTO: 0 % (ref 0–5)
LYMPHOCYTES # BLD: 1.7 K/UL (ref 0.5–4.6)
LYMPHOCYTES NFR BLD: 24 % (ref 13–44)
MCH RBC QN AUTO: 32.6 PG (ref 26.1–32.9)
MCHC RBC AUTO-ENTMCNC: 34.2 G/DL (ref 31.4–35)
MCV RBC AUTO: 95.5 FL (ref 79.6–97.8)
MONOCYTES # BLD: 0.8 K/UL (ref 0.1–1.3)
MONOCYTES NFR BLD: 12 % (ref 4–12)
NEUTS SEG # BLD: 4.2 K/UL (ref 1.7–8.2)
NEUTS SEG NFR BLD: 61 % (ref 43–78)
NRBC # BLD: 0 K/UL (ref 0–0.2)
PLATELET # BLD AUTO: 235 K/UL (ref 150–450)
PMV BLD AUTO: 9 FL (ref 9.4–12.3)
POTASSIUM SERPL-SCNC: 4.6 MMOL/L (ref 3.5–5.1)
PROT SERPL-MCNC: 7.3 G/DL (ref 6.3–8.2)
RBC # BLD AUTO: 4.66 M/UL (ref 4.05–5.2)
SODIUM SERPL-SCNC: 134 MMOL/L (ref 136–145)
WBC # BLD AUTO: 6.9 K/UL (ref 4.3–11.1)

## 2022-09-27 PROCEDURE — 85025 COMPLETE CBC W/AUTO DIFF WBC: CPT

## 2022-09-27 PROCEDURE — 96361 HYDRATE IV INFUSION ADD-ON: CPT

## 2022-09-27 PROCEDURE — 6360000002 HC RX W HCPCS: Performed by: EMERGENCY MEDICINE

## 2022-09-27 PROCEDURE — 96375 TX/PRO/DX INJ NEW DRUG ADDON: CPT

## 2022-09-27 PROCEDURE — 6370000000 HC RX 637 (ALT 250 FOR IP): Performed by: EMERGENCY MEDICINE

## 2022-09-27 PROCEDURE — 94762 N-INVAS EAR/PLS OXIMTRY CONT: CPT

## 2022-09-27 PROCEDURE — 72125 CT NECK SPINE W/O DYE: CPT

## 2022-09-27 PROCEDURE — 71045 X-RAY EXAM CHEST 1 VIEW: CPT

## 2022-09-27 PROCEDURE — 2580000003 HC RX 258: Performed by: EMERGENCY MEDICINE

## 2022-09-27 PROCEDURE — 70450 CT HEAD/BRAIN W/O DYE: CPT

## 2022-09-27 PROCEDURE — 72100 X-RAY EXAM L-S SPINE 2/3 VWS: CPT

## 2022-09-27 PROCEDURE — 99285 EMERGENCY DEPT VISIT HI MDM: CPT

## 2022-09-27 PROCEDURE — 96374 THER/PROPH/DIAG INJ IV PUSH: CPT

## 2022-09-27 PROCEDURE — G0378 HOSPITAL OBSERVATION PER HR: HCPCS

## 2022-09-27 PROCEDURE — 93005 ELECTROCARDIOGRAM TRACING: CPT | Performed by: EMERGENCY MEDICINE

## 2022-09-27 PROCEDURE — 72170 X-RAY EXAM OF PELVIS: CPT

## 2022-09-27 PROCEDURE — 80053 COMPREHEN METABOLIC PANEL: CPT

## 2022-09-27 RX ORDER — ONDANSETRON 2 MG/ML
4 INJECTION INTRAMUSCULAR; INTRAVENOUS
Status: COMPLETED | OUTPATIENT
Start: 2022-09-27 | End: 2022-09-27

## 2022-09-27 RX ORDER — BUPROPION HYDROCHLORIDE 150 MG/1
150 TABLET, EXTENDED RELEASE ORAL 2 TIMES DAILY
Status: CANCELLED | OUTPATIENT
Start: 2022-09-28

## 2022-09-27 RX ORDER — SODIUM CHLORIDE 0.9 % (FLUSH) 0.9 %
5-40 SYRINGE (ML) INJECTION EVERY 12 HOURS SCHEDULED
Status: CANCELLED | OUTPATIENT
Start: 2022-09-28

## 2022-09-27 RX ORDER — ACETAMINOPHEN 650 MG/1
650 SUPPOSITORY RECTAL EVERY 6 HOURS PRN
Status: DISCONTINUED | OUTPATIENT
Start: 2022-09-27 | End: 2022-09-28 | Stop reason: HOSPADM

## 2022-09-27 RX ORDER — 0.9 % SODIUM CHLORIDE 0.9 %
1000 INTRAVENOUS SOLUTION INTRAVENOUS ONCE
Status: COMPLETED | OUTPATIENT
Start: 2022-09-27 | End: 2022-09-27

## 2022-09-27 RX ORDER — SODIUM CHLORIDE 0.9 % (FLUSH) 0.9 %
5-40 SYRINGE (ML) INJECTION PRN
Status: DISCONTINUED | OUTPATIENT
Start: 2022-09-27 | End: 2022-09-28 | Stop reason: HOSPADM

## 2022-09-27 RX ORDER — ACETAMINOPHEN 650 MG/1
650 SUPPOSITORY RECTAL EVERY 6 HOURS PRN
Status: CANCELLED | OUTPATIENT
Start: 2022-09-27

## 2022-09-27 RX ORDER — POLYETHYLENE GLYCOL 3350 17 G/17G
17 POWDER, FOR SOLUTION ORAL DAILY PRN
Status: DISCONTINUED | OUTPATIENT
Start: 2022-09-27 | End: 2022-09-28 | Stop reason: HOSPADM

## 2022-09-27 RX ORDER — ACETAMINOPHEN 325 MG/1
650 TABLET ORAL EVERY 6 HOURS PRN
Status: CANCELLED | OUTPATIENT
Start: 2022-09-27

## 2022-09-27 RX ORDER — ACETAMINOPHEN 325 MG/1
650 TABLET ORAL EVERY 6 HOURS PRN
Status: DISCONTINUED | OUTPATIENT
Start: 2022-09-27 | End: 2022-09-28 | Stop reason: HOSPADM

## 2022-09-27 RX ORDER — ONDANSETRON 2 MG/ML
4 INJECTION INTRAMUSCULAR; INTRAVENOUS EVERY 6 HOURS PRN
Status: CANCELLED | OUTPATIENT
Start: 2022-09-27

## 2022-09-27 RX ORDER — BUPROPION HYDROCHLORIDE 150 MG/1
150 TABLET, EXTENDED RELEASE ORAL 2 TIMES DAILY
Status: DISCONTINUED | OUTPATIENT
Start: 2022-09-28 | End: 2022-09-28 | Stop reason: HOSPADM

## 2022-09-27 RX ORDER — ESCITALOPRAM OXALATE 10 MG/1
20 TABLET ORAL DAILY
Status: CANCELLED | OUTPATIENT
Start: 2022-09-28

## 2022-09-27 RX ORDER — SODIUM CHLORIDE 9 MG/ML
INJECTION, SOLUTION INTRAVENOUS PRN
Status: DISCONTINUED | OUTPATIENT
Start: 2022-09-27 | End: 2022-09-28 | Stop reason: HOSPADM

## 2022-09-27 RX ORDER — SODIUM CHLORIDE 0.9 % (FLUSH) 0.9 %
5-40 SYRINGE (ML) INJECTION PRN
Status: CANCELLED | OUTPATIENT
Start: 2022-09-27

## 2022-09-27 RX ORDER — POLYETHYLENE GLYCOL 3350 17 G/17G
17 POWDER, FOR SOLUTION ORAL DAILY PRN
Status: CANCELLED | OUTPATIENT
Start: 2022-09-27

## 2022-09-27 RX ORDER — ONDANSETRON 2 MG/ML
4 INJECTION INTRAMUSCULAR; INTRAVENOUS EVERY 6 HOURS PRN
Status: DISCONTINUED | OUTPATIENT
Start: 2022-09-27 | End: 2022-09-28 | Stop reason: HOSPADM

## 2022-09-27 RX ORDER — SODIUM CHLORIDE 9 MG/ML
INJECTION, SOLUTION INTRAVENOUS PRN
Status: CANCELLED | OUTPATIENT
Start: 2022-09-27

## 2022-09-27 RX ORDER — ONDANSETRON 4 MG/1
4 TABLET, ORALLY DISINTEGRATING ORAL EVERY 8 HOURS PRN
Status: CANCELLED | OUTPATIENT
Start: 2022-09-27

## 2022-09-27 RX ORDER — ESCITALOPRAM OXALATE 10 MG/1
20 TABLET ORAL DAILY
Status: DISCONTINUED | OUTPATIENT
Start: 2022-09-28 | End: 2022-09-28 | Stop reason: HOSPADM

## 2022-09-27 RX ORDER — SODIUM CHLORIDE 0.9 % (FLUSH) 0.9 %
5-40 SYRINGE (ML) INJECTION EVERY 12 HOURS SCHEDULED
Status: DISCONTINUED | OUTPATIENT
Start: 2022-09-27 | End: 2022-09-28 | Stop reason: HOSPADM

## 2022-09-27 RX ORDER — ONDANSETRON 4 MG/1
4 TABLET, ORALLY DISINTEGRATING ORAL EVERY 8 HOURS PRN
Status: DISCONTINUED | OUTPATIENT
Start: 2022-09-27 | End: 2022-09-28 | Stop reason: HOSPADM

## 2022-09-27 RX ORDER — CYCLOBENZAPRINE HCL 10 MG
10 TABLET ORAL
Status: COMPLETED | OUTPATIENT
Start: 2022-09-27 | End: 2022-09-27

## 2022-09-27 RX ORDER — HYDROMORPHONE HYDROCHLORIDE 1 MG/ML
0.5 INJECTION, SOLUTION INTRAMUSCULAR; INTRAVENOUS; SUBCUTANEOUS
Status: COMPLETED | OUTPATIENT
Start: 2022-09-27 | End: 2022-09-27

## 2022-09-27 RX ADMIN — CYCLOBENZAPRINE 10 MG: 10 TABLET, FILM COATED ORAL at 20:49

## 2022-09-27 RX ADMIN — ONDANSETRON 4 MG: 2 INJECTION INTRAMUSCULAR; INTRAVENOUS at 20:49

## 2022-09-27 RX ADMIN — HYDROMORPHONE HYDROCHLORIDE 0.5 MG: 1 INJECTION, SOLUTION INTRAMUSCULAR; INTRAVENOUS; SUBCUTANEOUS at 20:49

## 2022-09-27 RX ADMIN — SODIUM CHLORIDE 1000 ML: 9 INJECTION, SOLUTION INTRAVENOUS at 21:37

## 2022-09-27 ASSESSMENT — PAIN SCALES - GENERAL
PAINLEVEL_OUTOF10: 7
PAINLEVEL_OUTOF10: 3
PAINLEVEL_OUTOF10: 0

## 2022-09-27 ASSESSMENT — PAIN DESCRIPTION - LOCATION: LOCATION: NECK;HEAD

## 2022-09-27 ASSESSMENT — PAIN DESCRIPTION - ORIENTATION: ORIENTATION: POSTERIOR

## 2022-09-27 ASSESSMENT — PAIN DESCRIPTION - DESCRIPTORS: DESCRIPTORS: ACHING

## 2022-09-27 ASSESSMENT — PAIN - FUNCTIONAL ASSESSMENT: PAIN_FUNCTIONAL_ASSESSMENT: 0-10

## 2022-09-27 NOTE — ED TRIAGE NOTES
Pt arrives from home via POV. Had a fall on Saturday and another fall today with head injury. States she got dizzy, grabbed for the door and then fell. Also states she has had tremors for about 1 month.   Takes Xarelto

## 2022-09-28 ENCOUNTER — APPOINTMENT (OUTPATIENT)
Dept: NON INVASIVE DIAGNOSTICS | Age: 72
End: 2022-09-28
Attending: FAMILY MEDICINE
Payer: MEDICARE

## 2022-09-28 ENCOUNTER — HOSPITAL ENCOUNTER (OUTPATIENT)
Age: 72
Setting detail: OBSERVATION
LOS: 1 days | Discharge: HOME | End: 2022-09-29
Attending: FAMILY MEDICINE
Payer: MEDICARE

## 2022-09-28 ENCOUNTER — APPOINTMENT (OUTPATIENT)
Dept: ULTRASOUND IMAGING | Age: 72
End: 2022-09-28
Attending: FAMILY MEDICINE
Payer: MEDICARE

## 2022-09-28 VITALS
HEIGHT: 60 IN | OXYGEN SATURATION: 95 % | BODY MASS INDEX: 32.59 KG/M2 | HEART RATE: 48 BPM | WEIGHT: 166 LBS | DIASTOLIC BLOOD PRESSURE: 49 MMHG | SYSTOLIC BLOOD PRESSURE: 94 MMHG | RESPIRATION RATE: 14 BRPM | TEMPERATURE: 98.6 F

## 2022-09-28 DIAGNOSIS — R42 EPISODIC LIGHTHEADEDNESS: ICD-10-CM

## 2022-09-28 DIAGNOSIS — R29.6 RECURRENT FALLS: Primary | ICD-10-CM

## 2022-09-28 DIAGNOSIS — R00.1 BRADYCARDIA: ICD-10-CM

## 2022-09-28 PROBLEM — Z91.81 HISTORY OF FALL: Status: ACTIVE | Noted: 2022-09-28

## 2022-09-28 PROBLEM — R55 NEAR SYNCOPE: Status: ACTIVE | Noted: 2022-09-28

## 2022-09-28 LAB
ANION GAP SERPL CALC-SCNC: 2 MMOL/L (ref 4–13)
BUN SERPL-MCNC: 18 MG/DL (ref 8–23)
CALCIUM SERPL-MCNC: 9 MG/DL (ref 8.3–10.4)
CHLORIDE SERPL-SCNC: 105 MMOL/L (ref 101–110)
CK SERPL-CCNC: 74 U/L (ref 21–215)
CO2 SERPL-SCNC: 29 MMOL/L (ref 21–32)
CREAT SERPL-MCNC: 1.21 MG/DL (ref 0.6–1)
CRP SERPL-MCNC: 0.6 MG/DL (ref 0–0.9)
ECHO AO ASC DIAM: 2.9 CM
ECHO AO ASCENDING AORTA INDEX: 1.69 CM/M2
ECHO AO ROOT DIAM: 2.5 CM
ECHO AO ROOT INDEX: 1.45 CM/M2
ECHO AV AREA PEAK VELOCITY: 2.6 CM2
ECHO AV AREA VTI: 2.4 CM2
ECHO AV AREA/BSA PEAK VELOCITY: 1.5 CM2/M2
ECHO AV AREA/BSA VTI: 1.4 CM2/M2
ECHO AV MEAN GRADIENT: 4 MMHG
ECHO AV MEAN VELOCITY: 0.9 M/S
ECHO AV PEAK GRADIENT: 7 MMHG
ECHO AV PEAK VELOCITY: 1.3 M/S
ECHO AV VELOCITY RATIO: 0.85
ECHO AV VTI: 33.1 CM
ECHO BSA: 1.79 M2
ECHO EST RA PRESSURE: 3 MMHG
ECHO LA AREA 2C: 23.6 CM2
ECHO LA AREA 4C: 27.8 CM2
ECHO LA DIAMETER INDEX: 2.85 CM/M2
ECHO LA DIAMETER: 4.9 CM
ECHO LA MAJOR AXIS: 6.2 CM
ECHO LA MINOR AXIS: 5.7 CM
ECHO LA TO AORTIC ROOT RATIO: 1.96
ECHO LA VOL BP: 94 ML (ref 22–52)
ECHO LA VOL/BSA BIPLANE: 55 ML/M2 (ref 16–34)
ECHO LV E' LATERAL VELOCITY: 10 CM/S
ECHO LV E' SEPTAL VELOCITY: 5 CM/S
ECHO LV EDV A2C: 108 ML
ECHO LV EDV A4C: 116 ML
ECHO LV EDV INDEX A4C: 67 ML/M2
ECHO LV EDV NDEX A2C: 63 ML/M2
ECHO LV EJECTION FRACTION A2C: 65 %
ECHO LV EJECTION FRACTION A4C: 63 %
ECHO LV EJECTION FRACTION BIPLANE: 64 % (ref 55–100)
ECHO LV ESV A2C: 38 ML
ECHO LV ESV A4C: 43 ML
ECHO LV ESV INDEX A2C: 22 ML/M2
ECHO LV ESV INDEX A4C: 25 ML/M2
ECHO LV FRACTIONAL SHORTENING: 30 % (ref 28–44)
ECHO LV INTERNAL DIMENSION DIASTOLE INDEX: 2.56 CM/M2
ECHO LV INTERNAL DIMENSION DIASTOLIC: 4.4 CM (ref 3.9–5.3)
ECHO LV INTERNAL DIMENSION SYSTOLIC INDEX: 1.8 CM/M2
ECHO LV INTERNAL DIMENSION SYSTOLIC: 3.1 CM
ECHO LV IVSD: 0.8 CM (ref 0.6–0.9)
ECHO LV MASS 2D: 128 G (ref 67–162)
ECHO LV MASS INDEX 2D: 74.4 G/M2 (ref 43–95)
ECHO LV POSTERIOR WALL DIASTOLIC: 1 CM (ref 0.6–0.9)
ECHO LV RELATIVE WALL THICKNESS RATIO: 0.45
ECHO LVOT AREA: 3.1 CM2
ECHO LVOT AV VTI INDEX: 0.75
ECHO LVOT DIAM: 2 CM
ECHO LVOT MEAN GRADIENT: 2 MMHG
ECHO LVOT PEAK GRADIENT: 4 MMHG
ECHO LVOT PEAK VELOCITY: 1.1 M/S
ECHO LVOT STROKE VOLUME INDEX: 45.5 ML/M2
ECHO LVOT SV: 78.2 ML
ECHO LVOT VTI: 24.9 CM
ECHO MV A VELOCITY: 0.92 M/S
ECHO MV AREA VTI: 2.2 CM2
ECHO MV E DECELERATION TIME (DT): 159 MS
ECHO MV E VELOCITY: 0.92 M/S
ECHO MV E/A RATIO: 1
ECHO MV E/E' LATERAL: 9.2
ECHO MV E/E' RATIO (AVERAGED): 13.8
ECHO MV E/E' SEPTAL: 18.4
ECHO MV LVOT VTI INDEX: 1.41
ECHO MV MAX VELOCITY: 1.1 M/S
ECHO MV MEAN GRADIENT: 1 MMHG
ECHO MV MEAN VELOCITY: 0.5 M/S
ECHO MV PEAK GRADIENT: 5 MMHG
ECHO MV VTI: 35 CM
ECHO PV ACCELERATION TIME (AT): 106 MS
ECHO PV MAX VELOCITY: 1 M/S
ECHO PV PEAK GRADIENT: 4 MMHG
ECHO RIGHT VENTRICULAR SYSTOLIC PRESSURE (RVSP): 23 MMHG
ECHO RV BASAL DIMENSION: 3.7 CM
ECHO RV TAPSE: 1.8 CM (ref 1.7–?)
ECHO TV REGURGITANT MAX VELOCITY: 2.21 M/S
ECHO TV REGURGITANT PEAK GRADIENT: 20 MMHG
EKG ATRIAL RATE: 51 BPM
EKG DIAGNOSIS: NORMAL
EKG P AXIS: 31 DEGREES
EKG P-R INTERVAL: 138 MS
EKG Q-T INTERVAL: 456 MS
EKG QRS DURATION: 82 MS
EKG QTC CALCULATION (BAZETT): 420 MS
EKG R AXIS: 52 DEGREES
EKG T AXIS: 14 DEGREES
EKG VENTRICULAR RATE: 51 BPM
ERYTHROCYTE [SEDIMENTATION RATE] IN BLOOD: 14 MM/HR (ref 0–30)
GLUCOSE SERPL-MCNC: 79 MG/DL (ref 65–100)
MAGNESIUM SERPL-MCNC: 2.1 MG/DL (ref 1.8–2.4)
NT PRO BNP: 505 PG/ML (ref 5–125)
POTASSIUM SERPL-SCNC: 4.8 MMOL/L (ref 3.5–5.1)
SODIUM SERPL-SCNC: 136 MMOL/L (ref 136–145)
TROPONIN I SERPL HS-MCNC: 8.4 PG/ML (ref 0–14)
TSH W FREE THYROID IF ABNORMAL: 2.8 UIU/ML (ref 0.36–3.74)
VIT B12 SERPL-MCNC: 5622 PG/ML (ref 193–986)

## 2022-09-28 PROCEDURE — 99213 OFFICE O/P EST LOW 20 MIN: CPT | Performed by: INTERNAL MEDICINE

## 2022-09-28 PROCEDURE — C8929 TTE W OR WO FOL WCON,DOPPLER: HCPCS

## 2022-09-28 PROCEDURE — G0378 HOSPITAL OBSERVATION PER HR: HCPCS

## 2022-09-28 PROCEDURE — 36415 COLL VENOUS BLD VENIPUNCTURE: CPT

## 2022-09-28 PROCEDURE — 85652 RBC SED RATE AUTOMATED: CPT

## 2022-09-28 PROCEDURE — 84484 ASSAY OF TROPONIN QUANT: CPT

## 2022-09-28 PROCEDURE — 80048 BASIC METABOLIC PNL TOTAL CA: CPT

## 2022-09-28 PROCEDURE — 93880 EXTRACRANIAL BILAT STUDY: CPT

## 2022-09-28 PROCEDURE — 6360000004 HC RX CONTRAST MEDICATION: Performed by: INTERNAL MEDICINE

## 2022-09-28 PROCEDURE — 6370000000 HC RX 637 (ALT 250 FOR IP): Performed by: INTERNAL MEDICINE

## 2022-09-28 PROCEDURE — 82550 ASSAY OF CK (CPK): CPT

## 2022-09-28 PROCEDURE — 97530 THERAPEUTIC ACTIVITIES: CPT

## 2022-09-28 PROCEDURE — 83880 ASSAY OF NATRIURETIC PEPTIDE: CPT

## 2022-09-28 PROCEDURE — 97165 OT EVAL LOW COMPLEX 30 MIN: CPT

## 2022-09-28 PROCEDURE — 97161 PT EVAL LOW COMPLEX 20 MIN: CPT

## 2022-09-28 PROCEDURE — 6370000000 HC RX 637 (ALT 250 FOR IP): Performed by: FAMILY MEDICINE

## 2022-09-28 PROCEDURE — 93306 TTE W/DOPPLER COMPLETE: CPT | Performed by: INTERNAL MEDICINE

## 2022-09-28 PROCEDURE — 84443 ASSAY THYROID STIM HORMONE: CPT

## 2022-09-28 PROCEDURE — 83735 ASSAY OF MAGNESIUM: CPT

## 2022-09-28 PROCEDURE — 2580000003 HC RX 258: Performed by: INTERNAL MEDICINE

## 2022-09-28 PROCEDURE — 97535 SELF CARE MNGMENT TRAINING: CPT

## 2022-09-28 PROCEDURE — 2580000003 HC RX 258: Performed by: FAMILY MEDICINE

## 2022-09-28 PROCEDURE — 86140 C-REACTIVE PROTEIN: CPT

## 2022-09-28 PROCEDURE — 82607 VITAMIN B-12: CPT

## 2022-09-28 PROCEDURE — 96361 HYDRATE IV INFUSION ADD-ON: CPT

## 2022-09-28 RX ORDER — 0.9 % SODIUM CHLORIDE 0.9 %
500 INTRAVENOUS SOLUTION INTRAVENOUS ONCE
Status: COMPLETED | OUTPATIENT
Start: 2022-09-28 | End: 2022-09-28

## 2022-09-28 RX ORDER — SODIUM CHLORIDE 9 MG/ML
INJECTION, SOLUTION INTRAVENOUS PRN
Status: DISCONTINUED | OUTPATIENT
Start: 2022-09-28 | End: 2022-09-29 | Stop reason: HOSPADM

## 2022-09-28 RX ORDER — POLYETHYLENE GLYCOL 3350 17 G/17G
17 POWDER, FOR SOLUTION ORAL DAILY PRN
Status: DISCONTINUED | OUTPATIENT
Start: 2022-09-28 | End: 2022-09-29 | Stop reason: HOSPADM

## 2022-09-28 RX ORDER — ONDANSETRON 4 MG/1
4 TABLET, ORALLY DISINTEGRATING ORAL EVERY 8 HOURS PRN
Status: DISCONTINUED | OUTPATIENT
Start: 2022-09-28 | End: 2022-09-29 | Stop reason: HOSPADM

## 2022-09-28 RX ORDER — BUPROPION HYDROCHLORIDE 150 MG/1
150 TABLET, EXTENDED RELEASE ORAL 2 TIMES DAILY
Status: DISCONTINUED | OUTPATIENT
Start: 2022-09-28 | End: 2022-09-29 | Stop reason: HOSPADM

## 2022-09-28 RX ORDER — ACETAMINOPHEN 325 MG/1
650 TABLET ORAL EVERY 6 HOURS PRN
Status: DISCONTINUED | OUTPATIENT
Start: 2022-09-28 | End: 2022-09-29 | Stop reason: HOSPADM

## 2022-09-28 RX ORDER — ONDANSETRON 2 MG/ML
4 INJECTION INTRAMUSCULAR; INTRAVENOUS EVERY 6 HOURS PRN
Status: DISCONTINUED | OUTPATIENT
Start: 2022-09-28 | End: 2022-09-29 | Stop reason: HOSPADM

## 2022-09-28 RX ORDER — ESCITALOPRAM OXALATE 10 MG/1
20 TABLET ORAL DAILY
Status: DISCONTINUED | OUTPATIENT
Start: 2022-09-28 | End: 2022-09-29 | Stop reason: HOSPADM

## 2022-09-28 RX ORDER — SODIUM CHLORIDE 0.9 % (FLUSH) 0.9 %
5-40 SYRINGE (ML) INJECTION EVERY 12 HOURS SCHEDULED
Status: DISCONTINUED | OUTPATIENT
Start: 2022-09-28 | End: 2022-09-29 | Stop reason: HOSPADM

## 2022-09-28 RX ORDER — ASPIRIN 81 MG/1
81 TABLET ORAL DAILY
Status: DISCONTINUED | OUTPATIENT
Start: 2022-09-28 | End: 2022-09-29 | Stop reason: HOSPADM

## 2022-09-28 RX ORDER — ACETAMINOPHEN 650 MG/1
650 SUPPOSITORY RECTAL EVERY 6 HOURS PRN
Status: DISCONTINUED | OUTPATIENT
Start: 2022-09-28 | End: 2022-09-29 | Stop reason: HOSPADM

## 2022-09-28 RX ORDER — SODIUM CHLORIDE 0.9 % (FLUSH) 0.9 %
5-40 SYRINGE (ML) INJECTION PRN
Status: DISCONTINUED | OUTPATIENT
Start: 2022-09-28 | End: 2022-09-29 | Stop reason: HOSPADM

## 2022-09-28 RX ADMIN — SODIUM CHLORIDE, PRESERVATIVE FREE 10 ML: 5 INJECTION INTRAVENOUS at 21:15

## 2022-09-28 RX ADMIN — RIVAROXABAN 2.5 MG: 2.5 TABLET, FILM COATED ORAL at 21:14

## 2022-09-28 RX ADMIN — SODIUM CHLORIDE 500 ML: 900 INJECTION, SOLUTION INTRAVENOUS at 08:08

## 2022-09-28 RX ADMIN — ASPIRIN 81 MG: 81 TABLET, COATED ORAL at 18:48

## 2022-09-28 RX ADMIN — PERFLUTREN 0.45 ML: 6.52 INJECTION, SUSPENSION INTRAVENOUS at 10:45

## 2022-09-28 RX ADMIN — BUPROPION HYDROCHLORIDE 150 MG: 150 TABLET, EXTENDED RELEASE ORAL at 08:33

## 2022-09-28 RX ADMIN — ESCITALOPRAM OXALATE 20 MG: 10 TABLET ORAL at 08:33

## 2022-09-28 RX ADMIN — SODIUM CHLORIDE, PRESERVATIVE FREE 10 ML: 5 INJECTION INTRAVENOUS at 08:35

## 2022-09-28 RX ADMIN — BUPROPION HYDROCHLORIDE 150 MG: 150 TABLET, EXTENDED RELEASE ORAL at 21:14

## 2022-09-28 RX ADMIN — SODIUM CHLORIDE 500 ML: 900 INJECTION, SOLUTION INTRAVENOUS at 05:15

## 2022-09-28 ASSESSMENT — ENCOUNTER SYMPTOMS
ABDOMINAL PAIN: 0
FACIAL SWELLING: 0
BACK PAIN: 1
EYE DISCHARGE: 0
VOMITING: 0
EYE REDNESS: 0
NAUSEA: 0
COLOR CHANGE: 0
SHORTNESS OF BREATH: 0
COUGH: 0
RHINORRHEA: 0

## 2022-09-28 ASSESSMENT — PAIN SCALES - GENERAL
PAINLEVEL_OUTOF10: 0
PAINLEVEL_OUTOF10: 0

## 2022-09-28 NOTE — ED NOTES
TRANSFER - OUT REPORT:    Verbal report given to Waqarclara  on Olvin Nunes  being transferred to [facility] [phone]  for routine progression of patient care       Report consisted of patient's Situation, Background, Assessment and   Recommendations(SBAR). Information from the following report(s) ED SBAR, MAR, and Recent Results was reviewed with the receiving nurse. Lines:   Peripheral IV 09/27/22 Left;Proximal Forearm (Active)        Opportunity for questions and clarification was provided.       Patient transported with:  O2 @ 2lpm      EMS     Kwame Lawrence RN  09/28/22 0781

## 2022-09-28 NOTE — PROGRESS NOTES
TRANSFER - IN REPORT:    Verbal report received from [facility] on Frances Soni  being received from [facility] for routine progression of patient care      Report consisted of patient's Situation, Background, Assessment and   Recommendations(SBAR). Information from the following report(s) MAR, Recent Results, and Cardiac Rhythm Bradycardia  was reviewed with the receiving nurse. Opportunity for questions and clarification was provided. Assessment completed upon patient's arrival to unit and care assumed.

## 2022-09-28 NOTE — ED PROVIDER NOTES
[employer] Emergency Department Provider Note                   PCP:                LYN Yarbrough NP               Age: 81 y.o. Sex: female       ICD-10-CM    1. Bradycardia  R00.1 Transthoracic echocardiogram (TTE) complete with contrast, bubble, strain, and 3D PRN     Transthoracic echocardiogram (TTE) complete with contrast, bubble, strain, and 3D PRN     Transthoracic echocardiogram (TTE) complete with contrast, bubble, strain, and 3D PRN     CANCELED: Transthoracic echocardiogram (TTE) complete with contrast, bubble, strain, and 3D PRN      2. Episodic lightheadedness  R42 Vascular duplex carotid bilateral     Vascular duplex carotid bilateral     Vascular duplex carotid bilateral     CANCELED: Vascular duplex carotid bilateral      3. Neck pain  M54.2       4. Gait instability  R26.81       5. Frequent falls  R29.6           DISPOSITION Decision To Transfer 09/28/2022 12:48:34 AM       Discharge Medication List as of 9/28/2022 12:48 AM          Orders Placed This Encounter   Procedures    CT HEAD WO CONTRAST    CT CERVICAL SPINE WO CONTRAST    XR CHEST PORTABLE    XR LUMBAR SPINE (2-3 VIEWS)    XR PELVIS (1-2 VIEWS)    CBC with Auto Differential    Comprehensive Metabolic Panel    TSH with Reflex    Urinalysis with Reflex to Culture    Magnesium    Basic Metabolic Panel    CK    C-Reactive Protein    Sedimentation Rate    Vitamin B12    Brain Natriuretic Peptide    Troponin    ADULT DIET;  Regular; Low Fat/Low Chol/High Fiber/KHARI    Cardiac Monitor - ED Only    Continuous Pulse Oximetry    Orthostatic blood pressure and pulse    Misc nursing order (specify)    Vital signs per unit routine    Telemetry monitoring - 48 hour duration    Up with assistance    Notify physician    Inpatient consult to Cardiology    OT eval and treat    PT evaluation and treat    Initiate Oxygen Therapy Protocol    PLEASE READ & DOCUMENT PPD TEST IN 24 HRS    PLEASE READ & DOCUMENT PPD TEST IN 48 HRS    PLEASE READ & DOCUMENT PPD TEST IN 72 HRS    EKG 12 Lead    Place in Observation Service    Discharge patient    Vascular duplex carotid bilateral         Yenni Goncalves is a 81 y.o. female who presents to the Emergency Department with chief complaint of    Chief Complaint   Patient presents with    Fall      Chief complaint : Neck and head pain status post fall    HISTORY OF PRESENT ILLNESS :  Location : Occipital scalp contusion, fell at home    Quality : Suddenly gets dizzy and lightheaded and goes down    Quantity : Jolie Prom today, fell again Saturday 4 days ago and other falls this week    Timing : This afternoon    Severity : Moderate    Context : Is on blood thinners    Alleviating / exacerbating factors : No remedies attempted    Associated Symptoms : No nausea, vomiting, diarrhea, blood in stool    -------------------------------    SOCIAL HISTORY : , non-smoker, nondrinker        Review of Systems   Constitutional:  Negative for chills and fever. HENT:  Negative for facial swelling and rhinorrhea. Eyes:  Negative for discharge and redness. Respiratory:  Negative for cough and shortness of breath. Cardiovascular:  Negative for chest pain and palpitations. Gastrointestinal:  Negative for abdominal pain, nausea and vomiting. Endocrine: Negative for polydipsia and polyuria. Genitourinary:  Negative for difficulty urinating and dysuria. Musculoskeletal:  Positive for arthralgias, back pain, neck pain and neck stiffness. Skin:  Negative for color change and pallor. Neurological:  Positive for headaches. Negative for dizziness and syncope. All other systems reviewed and are negative. All other systems reviewed and are negative.       Past Medical History:   Diagnosis Date    Adverse effect of anesthesia     hard to wake after    Angina pectoris ([facility] [address] 10/13/2015    Arthritis     hands & hips    CAD (coronary artery disease)     5/06 MI and 4 vessel CABG, patient states she has angina pain-last time takingNTG 2 week ago    Chest pain, unspecified 5/27/2011    Coronary atherosclerosis of native coronary artery 10/13/2015    Dyslipidemia 5/27/2011    Dyspnea 10/13/2015    Essential tremor 9/27/2022    Extremity atherosclerosis with intermittent claudication ([facility] [address] 10/13/2015    GERD (gastroesophageal reflux disease)     tums    Heart failure (HCC)     Hx of CABG 5/27/2011    LIMA and 3 SVG. 2 OMs/PLV and one acute marginal of RCA     Hypertension     LVH (left ventricular hypertrophy) 10/13/2015    MI, old     Peripheral arterial disease ([facility] [address] 6/29/2012    Pleural effusion 10/13/2015    Psychiatric disorder     S/P coronary artery stent placement 5/27/2011    2.0 x 15mm Xience to OM2-5/27/11     S/P peripheral artery angioplasty 6/29/2012    Severe obesity (BMI 35.0-39. 9) 7/19/2018    Sleep apnea     does not wear cpap- weight loss    Tobacco use disorder 10/13/2015    Unspecified adverse effect of anesthesia     slow to wake up        Past Surgical History:   Procedure Laterality Date    APPENDECTOMY      BREAST SURGERY      biopsy left    CHOLECYSTECTOMY      08/01/09    GASTRIC BYPASS SURGERY      gastric bypass    HYSTERECTOMY (CERVIX STATUS UNKNOWN)       partial hyst.,bilat oopherectomy    OVARY REMOVAL      [place] CARDIAC SURG PROCEDURE UNLIST      4 vessel CABG - stent x3    UROLOGICAL SURGERY      bladder tack    VASCULAR SURGERY Bilateral 11/12/2018    iliac angio and stent        Family History   Problem Relation Age of Onset    Heart Attack Father     Heart Disease Mother     Colon Cancer Neg Hx     Heart Disease Brother         Social Connections: Not on file        Allergies   Allergen Reactions    Penicillins Hives     Has taken penicillin without any reactions - states she is no longer allergic     Adhesive Tape Rash     Paper tape ok    Codeine Nausea And Vomiting        Vitals signs and nursing note reviewed.    Patient Vitals for the past 4 hrs:   Pulse Resp BP SpO2   09/28/22 0016 -- 14 -- 95 % 09/28/22 0012 (!) 48 -- -- --   09/28/22 0009 (!) 49 18 (!) 94/49 95 %   09/28/22 0008 50 15 -- 94 %   09/28/22 0004 51 -- -- --   09/27/22 2352 (!) 47 14 83/67 95 %   09/27/22 2351 -- 13 -- 95 %   09/27/22 2303 53 16 84/67 94 %          Physical Exam  Vitals and nursing note reviewed. Constitutional:       General: She is not in acute distress. Appearance: Normal appearance. She is not ill-appearing, toxic-appearing or diaphoretic. HENT:      Head: Normocephalic. Contusion present. No abrasion. Right Ear: External ear normal.      Left Ear: External ear normal.      Nose: Nose normal. No rhinorrhea. Eyes:      General: No scleral icterus. Right eye: No discharge. Left eye: No discharge. Extraocular Movements: Extraocular movements intact. Conjunctiva/sclera: Conjunctivae normal.      Pupils: Pupils are equal, round, and reactive to light. Cardiovascular:      Rate and Rhythm: Normal rate and regular rhythm. Pulmonary:      Effort: Pulmonary effort is normal. No respiratory distress. Breath sounds: Normal breath sounds. Abdominal:      Palpations: Abdomen is soft. There is no fluid wave or pulsatile mass. Tenderness: There is no abdominal tenderness. There is no guarding or rebound. Musculoskeletal:         General: Tenderness present. No deformity or signs of injury. Cervical back: Normal range of motion and neck supple. Tenderness (Diffuse, both paraspinal and midline) present. No rigidity. Lumbar back: Tenderness present. Decreased range of motion. Skin:     General: Skin is warm and dry. Coloration: Skin is not jaundiced or pale. Neurological:      General: No focal deficit present. Mental Status: She is alert and oriented to person, place, and time. Psychiatric:         Mood and Affect: Mood normal.         Behavior: Behavior normal.         Thought Content:  Thought content normal.        MDM  Number of Diagnoses or Management Options  Bradycardia: new, needed workup  Episodic lightheadedness: new, needed workup  Frequent falls: new, needed workup  Gait instability: new, needed workup  Neck pain: new, needed workup  Diagnosis management comments: Frequent falls, lab work-up and radiographic work-up are negative, patient unable to stand for the orthostatic standing vital signs, she starts to fall over backwards when nursing and family helped redirect her to the seated position on the bed    Admit to hospitalist for fluids and further evaluation, unable to stand up to go home       Amount and/or Complexity of Data Reviewed  Clinical lab tests: ordered and reviewed  Tests in the radiology section of CPT®: ordered and reviewed  Decide to obtain previous medical records or to obtain history from someone other than the patient: yes  Obtain history from someone other than the patient: yes (Family at bedside)  Discuss the patient with other providers: yes (Hospitalists)    Risk of Complications, Morbidity, and/or Mortality  Presenting problems: moderate  Diagnostic procedures: low  Management options: low    Patient Progress  Patient progress: improved      Procedures    Labs Reviewed   CBC WITH AUTO DIFFERENTIAL - Abnormal; Notable for the following components:       Result Value    MPV 9.0 (*)     All other components within normal limits   COMPREHENSIVE METABOLIC PANEL - Abnormal; Notable for the following components:    Sodium 134 (*)     GFR Non- 58 (*)     Globulin 3.6 (*)     Albumin/Globulin Ratio 1.0 (*)     All other components within normal limits   TSH WITH REFLEX   URINALYSIS WITH REFLEX TO CULTURE   MAGNESIUM   BASIC METABOLIC PANEL   CK   C-REACTIVE PROTEIN   SEDIMENTATION RATE   VITAMIN B12   BRAIN NATRIURETIC PEPTIDE   TROPONIN   PLEASE READ & DOCUMENT PPD TEST IN 24 HRS        XR LUMBAR SPINE (2-3 VIEWS)   Final Result   No fracture. XR PELVIS (1-2 VIEWS)   Final Result   No fracture.       CT HEAD WO CONTRAST   Final Result   No CT evidence of acute intracranial abnormality. CT CERVICAL SPINE WO CONTRAST   Final Result   No acute osseous abnormality. XR CHEST PORTABLE   Final Result   1. No consolidation. 2.  Pulmonary vascular congestion, prior heart surgery. Vascular duplex carotid bilateral    (Results Pending)            Pierce Coma Scale  Eye Opening: Spontaneous  Best Verbal Response: Oriented  Best Motor Response: Obeys commands  [place] Coma Scale Score: 15                     Voice dictation software was used during the making of this note. This software is not perfect and grammatical and other typographical errors may be present. This note has not been completely proofread for errors.        Joan Winchester MD  09/28/22 9516

## 2022-09-28 NOTE — CONSULTS
CARDIOLOGY Consult                 Reason For Consult: Sinus bradycardia    Subjective:     Patient is a 81 y.o. female with a PMH of PAD status post peripheral angioplasty, CAD status post CABG, and hyperlipidemia for whom we were consulted for sinus bradycardia. The patient presented to [facility] ED today for near syncope and a fall. She had an ECG yesterday that was consistent with sinus bradycardia with a heart rate of 51 bpm.  Nonspecific ST and/or T wave abnormalities were noted. She takes atenolol regularly. The patient was last seen by Dr. Jason Arango in October 2021. She follows with vascular surgery with a history of PAD. The patient reports that she notified her PCP that she was having malaise for the last 2 weeks. She made an appointment with Dr. Jason Arango this morning but missed her appointment. She reports that she fell on Saturday with no witnesses. She denies syncope. The patient is a poor historian regarding the fall, however. She said that she \"yelled\" for her . The patient denies a history of syncope. She reports that she gets near syncope when \"getting up too fast\". She also reports falls that she downplays and notes that the fall on Saturday was the worst that she has had recently. The patient denies angina. She reports having chronic dyspnea for the last 16 years.     Past Medical History:   Diagnosis Date    Adverse effect of anesthesia     hard to wake after    Angina pectoris ([address] 10/13/2015    Arthritis     hands & hips    CAD (coronary artery disease)     5/06 MI and 4 vessel CABG, patient states she has angina pain-last time takingNTG 2 week ago    Chest pain, unspecified 5/27/2011    Coronary atherosclerosis of native coronary artery 10/13/2015    Dyslipidemia 5/27/2011    Dyspnea 10/13/2015    Essential tremor 9/27/2022    Extremity atherosclerosis with intermittent claudication ([address] 10/13/2015    GERD (gastroesophageal reflux disease)     tums    Heart failure (HCC)     Hx of CABG 2011    LIMA and 3 SVG. 2 OMs/PLV and one acute marginal of RCA     Hypertension     LVH (left ventricular hypertrophy) 10/13/2015    MI, old     Peripheral arterial disease ([address] 2012    Pleural effusion 10/13/2015    Psychiatric disorder     S/P coronary artery stent placement 2011    2.0 x 15mm Xience to OM2-11     S/P peripheral artery angioplasty 2012    Severe obesity (BMI 35.0-39. 9) 2018    Sleep apnea     does not wear cpap- weight loss    Tobacco use disorder 10/13/2015    Unspecified adverse effect of anesthesia     slow to wake up      Past Surgical History:   Procedure Laterality Date    APPENDECTOMY      BREAST SURGERY      biopsy left    CHOLECYSTECTOMY      09    GASTRIC BYPASS SURGERY      gastric bypass    HYSTERECTOMY (CERVIX STATUS UNKNOWN)       partial hyst.,bilat oopherectomy    OVARY REMOVAL      [place] CARDIAC SURG PROCEDURE UNLIST      4 vessel CABG - stent x3    UROLOGICAL SURGERY      bladder tack    VASCULAR SURGERY Bilateral 2018    iliac angio and stent      Family History   Problem Relation Age of Onset    Heart Attack Father     Heart Disease Mother     Colon Cancer Neg Hx     Heart Disease Brother       Social History     Tobacco Use    Smoking status: Former     Types: Cigarettes     Quit date: 2006     Years since quittin.3    Smokeless tobacco: Never    Tobacco comments:     Quit smokin/06   Substance Use Topics    Alcohol use: Yes     Alcohol/week: 0.8 standard drinks      Allergies   Allergen Reactions    Penicillins Hives     Has taken penicillin without any reactions - states she is no longer allergic     Adhesive Tape Rash     Paper tape ok    Codeine Nausea And Vomiting         Constitutional:   Negative unexplained weight loss. Eyes:   Negative for unexplained blindness. ENT:   Negative for unexplained hearing loss. Respiratory:   Negative for unexplained hemoptysis.   Cardiovascular:   Negative except as noted in HPI.  Gastrointestinal:   Negative for unexplained vomiting. Genitourinary:   Negative for unexplained hematuria. Integumentary:   Negative for unexplained rash. Hematologic/Lymphatic:   Negative for unexplained excessive bleeding. Musculoskeletal:  Negative for unexplained joint pain. Neurological:   Negative for stroke. Behavioral/Psych:   Negative for suicidal ideations. Endocrine:   Negative for uncontrolled diabetic symptoms including polyuria, polydipsia. Objective:       BP (!) 98/47 Comment: nurse notified  Pulse (!) 48   Temp 97.7 °F (36.5 °C) (Oral)   Resp 17   Ht 5' (1.524 m)   Wt 166 lb (75.3 kg)   SpO2 94%   BMI 32.42 kg/m²     09/28 0701 - 09/28 1900  In: 200 [P.O.:200]  Out: 200 [Urine:200]  No intake/output data recorded.     General/Constitutional:   Alert and oriented x 3, no acute distress  HEENT:   normocephalic, atraumatic, moist mucous membranes  Neck:   No JVD or carotid bruits bilaterally  Cardiovascular:   regular rate and rhythm, no rub/gallop appreciated  Pulmonary:   clear to auscultation bilaterally, no respiratory distress  Abdomen:   soft, non-tender, non-distended  Ext:   No sig LE edema bilaterally  Skin:  warm and dry, no obvious rashes seen  Neuro:   no obvious sensory or motor deficits  Psychiatric:   normal mood and affect    Data Review:   Recent Labs     09/27/22  1754 09/28/22  0502   * 136   K 4.6 4.8   MG  --  2.1   BUN 16 18   WBC 6.9  --    HGB 15.2  --    HCT 44.5  --      --        No results found for: CHOL  No results found for: TRIG  No results found for: HDL  No results found for: LDLCHOLESTEROL, LDLCALC  No results found for: LABVLDL, VLDL  No results found for: [facility]     Lab Results   Component Value Date/Time     09/28/2022 05:02 AM     09/27/2022 05:54 PM    K 4.8 09/28/2022 05:02 AM    K 4.6 09/27/2022 05:54 PM     09/28/2022 05:02 AM     09/27/2022 05:54 PM    CO2 29 09/28/2022 05:02 AM    CO2 26 09/27/2022 05:54 PM    BUN 18 09/28/2022 05:02 AM    BUN 16 09/27/2022 05:54 PM    CREATININE 1.21 09/28/2022 05:02 AM    CREATININE 1.00 09/27/2022 05:54 PM    GLUCOSE 79 09/28/2022 05:02 AM    GLUCOSE 93 09/27/2022 05:54 PM    CALCIUM 9.0 09/28/2022 05:02 AM    CALCIUM 10.2 09/27/2022 05:54 PM         Lab Results   Component Value Date    ALT 25 09/27/2022    AST 16 09/27/2022          Assessment/Plan:     1. Sinus bradycardia by ECG  2. History of fall  3. Near syncope  4. Hypertension  5. Hx of CABG  6. PAD    The patient has sinus bradycardia in the setting of beta-blocker use with heart rates occasionally in the 40s (currently in the 50s). She does not have evidence of clinically important bradycardia on telemetry. There is no compelling indication for a beta-blocker at this point in the absence of angina and in the absence of an MI in the last 3 years. Consider alternative therapy for hypertension if needed (patient with hypotension presently). The patient denies syncope but reports occasional falls. She is a poor historian regarding these falls. She reports near syncope upon standing most consistent with orthostatic hypotension. Recommend orthostatic vital signs if not already performed. The patient is currently not on antithrombotic therapy. Consider SAPT and statin for CAD and PAD with revascularization. Follow-up TTE. Vascular duplex carotid ultrasound is pending.     Trinh Muhammad MD

## 2022-09-28 NOTE — PROGRESS NOTES
ACUTE PHYSICAL THERAPY GOALS:   (Developed with and agreed upon by patient and/or caregiver. )      LTG:  (1.)Ms. Janak Shine will move from supine to sit and sit to supine  in bed with INDEPENDENT within 7 treatment day(s). (2.)Ms. Janak Shine will transfer from bed to chair and chair to bed with SUPERVISION using the least restrictive device within 7 treatment day(s). (3.)Ms. Janak Shine will ambulate with SUPERVISION for 300 feet with the least restrictive device within 7 treatment day(s). ________________________________________________________________________________________________     PHYSICAL THERAPY Initial Assessment and PM  (Link to Caseload Tracking: PT Visit Days : 1  Acknowledge Orders  Time In/Out  PT Charge Capture  Rehab Caseload Tracker    Olvin Nunes is a 81 y.o. female   PRIMARY DIAGNOSIS: Recurrent falls  Recurrent falls [R29.6]       Reason for Referral: Other abnormalities of gait and mobility (R26.89)  Repeated Falls (R29.6)  Observation: Payor: David Graham / Plan: Cherl Slice / Product Type: *No Product type* /     ASSESSMENT:     REHAB RECOMMENDATIONS:   Recommendation to date pending progress:  Setting:  STR vs HHPT- will need to further assess. If goes home will need family supervision for safety and possibly a RW due to poor balance and frequent falls    Equipment:    To Be Determined     ASSESSMENT:  Ms. Janak Shine presents with repeated falls due to limited balance and dizziness and will benefit from PT to increase her safety and independence with mobility. She may need a RW at discharge and depending on how quickly she progresses she may need to be considered for STR. If she does go home she will need family supervision and HHPT. She transferred supine to sit and then stood and ambulated with HHA x 2 within the room before sitting up in the recliner. Instructed not to get up by herself. She is aware that her balance is poor and appeared open to therapy discharge suggestions. [address] [id number] AM-PAC 6 Clicks Basic Mobility Inpatient Short Form  AM-PAC Mobility Inpatient   How much difficulty turning over in bed?: A Little  How much difficulty sitting down on / standing up from a chair with arms?: A Little  How much difficulty moving from lying on back to sitting on side of bed?: A Little  How much help from another person moving to and from a bed to a chair?: A Little  How much help from another person needed to walk in hospital room?: A Little  How much help from another person for climbing 3-5 steps with a railing?: A Little  [facility] Inpatient Mobility Raw Score : 18  AM-PAC Inpatient T-Scale Score : 43.63  Mobility Inpatient CMS 0-100% Score: 46.58  Mobility Inpatient CMS G-Code Modifier : CK    SUBJECTIVE:   Ms. Wakefield  states, \"when I stand up I have to take my time. \"  Still sore from fall in left back/hip region    Social/Functional Lives With: Spouse, Family  Type of Home: House  ADL Assistance: Independent  Homemaking Assistance: Independent  Ambulation Assistance: Independent  Transfer Assistance: Independent  Active : Yes  Mode of Transportation: Car  Occupation: Retired    OBJECTIVE:     PAIN: VITALS / O2: PRECAUTION / Nelma Kelch / Gradie Papa:   Pre Treatment:          Post Treatment: 0 Vitals        Oxygen      None    RESTRICTIONS/PRECAUTIONS:  Restrictions/Precautions: Fall Risk                 GROSS EVALUATION: Intact Impaired (Comments):   AROM []  WFL   PROM []    Strength []  WFL    Balance []  Impaired - standing fair and dynamic poor   Posture []    Sensation [x]     Coordination [x]      Tone [x]     Edema []    Activity Tolerance []  Limited by fatigue     []      COGNITION/  PERCEPTION: Intact Impaired (Comments):   Orientation [x]     Vision [x]     Hearing [x]     Cognition  [x]       MOBILITY: I Mod I S SBA CGA Min Mod Max Total  NT x2 Comments:   Bed Mobility    Rolling [] [] [] [x] [] [] [] [] [] [] []    Supine to Sit [] [] [] [x] [] [] [] [] [] [] [] Scooting [] [] [] [x] [] [] [] [] [] [] []    Sit to Supine [] [] [] [] [] [] [] [] [] [] []    Transfers    Sit to Stand [] [] [] [] [x] [] [] [] [] [] []    Bed to Chair [] [] [] [] [] [x] [] [] [] [] []    Stand to Sit [] [] [] [] [x] [] [] [] [] [] []     [] [] [] [] [] [] [] [] [] [] []    I=Independent, Mod I=Modified Independent, S=Supervision, SBA=Standby Assistance, CGA=Contact Guard Assistance,   Min=Minimal Assistance, Mod=Moderate Assistance, Max=Maximal Assistance, Total=Total Assistance, NT=Not Tested    GAIT: I Mod I S SBA CGA Min Mod Max Total  NT x2 Comments:   Level of Assistance [] [] [] [] [] [x] [] [] [] [] []    Distance 30 feet    DME HHA x 2 but recommend RW    Gait Quality Ataxic, Decreased you , Decreased step clearance, Lurching, and Path deviations     Weightbearing Status Restrictions/Precautions  Restrictions/Precautions: Fall Risk    Stairs      I=Independent, Mod I=Modified Independent, S=Supervision, SBA=Standby Assistance, CGA=Contact Guard Assistance,   Min=Minimal Assistance, Mod=Moderate Assistance, Max=Maximal Assistance, Total=Total Assistance, NT=Not Tested    PLAN:   FREQUENCY AND DURATION: Daily for duration of hospital stay or until stated goals are met, whichever comes first.    THERAPY PROGNOSIS: Excellent    PROBLEM LIST:   (Skilled intervention is medically necessary to address:)  Decreased Activity Tolerance  Decreased Balance  Decreased Gait Ability  Decreased Safety Awareness  Decreased Strength  Decreased Transfer Abilities INTERVENTIONS PLANNED:   (Benefits and precautions of physical therapy have been discussed with the patient.)  Therapeutic Activity  Therapeutic Exercise/HEP  Gait Training  Education       TREATMENT:   EVALUATION: LOW COMPLEXITY: (Untimed Charge)    TREATMENT:   Therapeutic Activity (20 Minutes):  Therapeutic activity included Rolling, Supine to Sit, Scooting, Transfer Training, Ambulation on level ground, Sitting balance , and Standing balance to improve functional Activity tolerance, Balance, Coordination, Mobility, and Strength. TREATMENT GRID:  N/A    AFTER TREATMENT PRECAUTIONS: Bed/Chair Locked, Call light within reach, Chair, and Needs within reach    INTERDISCIPLINARY COLLABORATION:  PT/ PTA and OT/ PAYNE    EDUCATION: Education Given To: Patient  Education Provided: Role of Therapy;Plan of Care; Fall Prevention Strategies;Transfer Training  Education Method: Demonstration;Verbal  Barriers to Learning: None  Education Outcome: Verbalized understanding;Demonstrated understanding    TIME IN/OUT:  Time In: 1610  Time Out: [facility]  Minutes: [facility], PT

## 2022-09-28 NOTE — ED NOTES
Shweta Remy here to transport pt to [address] [place] [address], [facility]  09/28/22 [id number]

## 2022-09-28 NOTE — PROGRESS NOTES
ACUTE OCCUPATIONAL THERAPY GOALS:   (Developed with and agreed upon by patient and/or caregiver.)  1. Patient will perform grooming with supervision. 2. Patient will perform upper body dressing with supervision. 3. Patient will perform lower body dressing with supervision. 4. Patient will perform bathing with supervision. 5. Patient will perform toileting and toilet transfer with supervision. 6. Patient will perform ADL functional mobility and tranfers in room with supervision. 7. Patient/family to demonstrate knowledge of home safety and DME recommendations. Goals to be achieved in 7 days. OCCUPATIONAL THERAPY Initial Assessment, Daily Note, and PM       OT Visit Days: 1  Acknowledge Orders  Time  OT Charge Capture  Rehab Caseload Tracker      Irasema Sierra is a 81 y.o. female   PRIMARY DIAGNOSIS: Recurrent falls  Recurrent falls [R29.6]       Reason for Referral: Generalized Muscle Weakness (M62.81)  Repeated Falls (R29.6)  Observation: Payor: Vinay Allen / Plan: Martín Dsouza / Product Type: *No Product type* /     ASSESSMENT:     REHAB RECOMMENDATIONS:   Recommendation to date pending progress:  [address] [place] [address] with support at home or STR if no support at home    Equipment:    To Be Determined     ASSESSMENT:  Ms. Adam Jensen was admitted with above diagnosis, pt reports having episodes of dizziness that cause her to fall. Pt is noted to have mild generalized weakness and poor standing balance. Pt would benefit from OT to maximize safety and independence with self care and functional mobility. Pt might also benefit from home health therapy at discharge. Pt up in room with hand held assistance, pt tends to lose her balance backward. Pt to recliner and set up with all needs. OT will follow.        [address] [facility] [address] [id number] AM-PAC 6 Clicks Daily Activity Inpatient Short Form:    AM-PAC Daily Activity Inpatient   How much help for putting on and taking off regular lower body clothing?: A Little  How much help for Bathing?: A Little  How much help for Toileting?: A Little  How much help for putting on and taking off regular upper body clothing?: A Little  How much help for taking care of personal grooming?: A Little  How much help for eating meals?: None  AM-PAC Inpatient Daily Activity Raw Score: 19  AM-PAC Inpatient ADL T-Scale Score : 40.22  ADL Inpatient CMS 0-100% Score: 42.8  ADL Inpatient CMS G-Code Modifier : CK           SUBJECTIVE:     Ms. Natarajan Double states, she has had falls     Social/Functional Lives With: Spouse, Family  Type of Home: House  ADL Assistance: Independent  Homemaking Assistance: Independent  Ambulation Assistance: Independent  Transfer Assistance: Independent  Active : Yes  Mode of Transportation: Car  Occupation: Retired    OBJECTIVE:     Cori Leach / Sasha Prabhakar / Amparo Shoaib: None    RESTRICTIONS/PRECAUTIONS:       PAIN: [place] / O2:   Pre Treatment:          Post Treatment: left hip pain, due to fall        Vitals          Oxygen            GROSS EVALUATION: INTACT IMPAIRED   (See Comments)   UE AROM [x] []   UE PROM [x] []   Strength []  Generalized weakness     Posture / Balance []  Poor standing balance.     Sensation [x]     Coordination [x]       Tone [x]       Edema []    Activity Tolerance [x]       Hand Dominance R [] L []      COGNITION/  PERCEPTION: INTACT IMPAIRED   (See Comments)   Orientation [x]     Vision [x]     Hearing [x]     Cognition  [x]     Perception [x]       MOBILITY: I Mod I S SBA CGA Min Mod Max Total  NT x2 Comments:   Bed Mobility    Rolling [] [] [] [] [] [] [] [] [] [] []    Supine to Sit [] [] [] [] [] [x] [] [] [] [] []    Scooting [] [] [] [] [] [x] [] [] [] [] []    Sit to Supine [] [] [] [] [] [] [] [] [] [] []    Transfers    Sit to Stand [] [] [] [] [] [x] [] [] [] [] []    Bed to Chair [] [] [] [] [] [x] [] [] [] [] []    Stand to Sit [] [] [] [] [] [x] [] [] [] [] []    Tub/Shower [] [] [] [] [] [x] [] [] [] [] []     Toilet [] [] [] [] [] [x] [] [] [] [] []      [] [] [] [] [] [] [] [] [] [] []    I=Independent, Mod I=Modified Independent, S=Supervision/Setup, SBA=Standby Assistance, CGA=Contact Guard Assistance, Min=Minimal Assistance, Mod=Moderate Assistance, Max=Maximal Assistance, Total=Total Assistance, NT=Not Tested    ACTIVITIES OF DAILY LIVING: I Mod I S SBA CGA Min Mod Max Total NT Comments   BASIC ADLs:              Upper Body Bathing  [] [] [] [] [] [x] [] [] [] []    Lower Body Bathing [] [] [] [] [] [x] [] [] [] []    Toileting [] [] [] [] [] [x] [] [] [] []    Upper Body Dressing [] [] [] [] [] [x] [] [] [] []    Lower Body Dressing [] [] [] [] [] [x] [] [] [] []    Feeding [x] [] [] [] [] [] [] [] [] []    Grooming [] [] [] [] [] [x] [] [] [] []    Personal Device Care [] [] [] [] [] [] [] [] [] []    Functional Mobility [] [] [] [] [] [x] [] [] [] []    I=Independent, Mod I=Modified Independent, S=Supervision/Setup, SBA=Standby Assistance, CGA=Contact Guard Assistance, Min=Minimal Assistance, Mod=Moderate Assistance, Max=Maximal Assistance, Total=Total Assistance, NT=Not Tested    PLAN:   FREQUENCY/DURATION   OT Plan of Care: 3 times/week for duration of hospital stay or until stated goals are met, whichever comes first.    PROBLEM LIST:   (Skilled intervention is medically necessary to address:)  Decreased ADL/Functional Activities  Decreased Balance  Decreased Strength  Increased Pain   INTERVENTIONS PLANNED:  (Benefits and precautions of occupational therapy have been discussed with the patient.)  Self Care Training  Therapeutic Activity  Therapeutic Exercise/HEP  Neuromuscular Re-education  Manual Therapy  Education         TREATMENT:     EVALUATION: LOW COMPLEXITY: (Untimed Charge)    TREATMENT:   Self Care: (15 min): Procedure(s) (per grid) utilized to improve and/or restore self-care/home management as related to  functional mobility .  Required minimal verbal, manual, and   cueing to facilitate activities of daily living skills and compensatory activities. TREATMENT GRID:  N/A    AFTER TREATMENT PRECAUTIONS: Bed/Chair Locked, Call light within reach, Chair, Needs within reach, and RN notified    INTERDISCIPLINARY COLLABORATION:  RN/ PCT, PT/ PTA, and OT/ PAYNE    EDUCATION:  Education Given To: Patient  Education Provided: Role of Therapy;Plan of Care;Transfer Training; Fall Prevention Strategies  Education Outcome: Verbalized understanding;Demonstrated understanding    TOTAL TREATMENT DURATION AND TIME:  Time In: [facility]  Time Out: [facility]  Minutes: [facility], OT

## 2022-09-28 NOTE — PROGRESS NOTES
Hospitalist Progress Note   Admit Date:  2022 12:55 AM   Name:  Nico Guy   Age:  81 y.o. Sex:  female  :  1950   MRN:  [id number]   Room:  [facility]    Presenting Complaint: No chief complaint on file. Reason(s) for Admission: Recurrent falls [R29.6]     Hospital Course:   iNco Guy is a 81 y.o. female with medical history of obesity, former smoker, PAD s/p BL iliac stents, CAD s/p 4v- CABG, CHF who presented with c/o falls, dizziniess/lightheadedness, and worsening tremors. She fell on on Saturday and another fall today with head injury. Reports getting dizzy/lightheaded, grabbed the door and then fell. Takes Xarelto. In ED, BP wide pulse pressure. HR 49. CXR pulmonary vascular congestion, trace interstitial edema and trace BL pleural effusions. CT head chronic microangiopathy. C spine no acute. Lumbar XR with degenerative changes, no acute fracture. XR pelvis - no acute findings. CBC unremarkable. CMP with Sna 134. Rec'd dilaudid and NS IVF bolus. Daughter at beside. She recently moved in with her daughter who has noticed a shuffling gait and worsening tremors. Tremors wake patient up at night, occur both at rest and with movement. She has seen Dr Karen Haque in the past who recommended drinking a little wine at bedtime for tremors and that if atenolol was stopped, she'd likely have worsening tremors. Pt states she hasn't felt well in awhile. Eating and drinking normally. No dysuria or difficulty urinating. Admits to finger tip numbness/tingling ongoing for years. Constipaton intermittently. Has not taken lasix PO in a long time. She is suppose to FU with DR dean in the AM.       Subjective & 24hr Events (22): Patient with episodes of bradycardia overnight. Also with low bp. Patient asymptomatic however      Assessment & Plan:     Principal Problem:    Recurrent falls  Plan: TSH wnl, carotid dopplers and echocardiogram wnl.   Cardiology feels that may this is caused by orthostatic hypotension. Recommend stopping Atenolol and repeating orthostatic blood pressure  Active Problems:    Sinus bradycardia by electrocardiogram  Plan: Will d/c atenolol      History of fall  Plan: PT/OT eval pending       PAD (peripheral artery disease) ([facility] [address]  Plan: will restart xarelto and add asa        Chronic HFpEF  Plan: echo wnl. BNP mildly elevated. Not respiratory distress      Hyponatremia  Plan: resolved     Neuropathy  Plan: Vit b12 wnl     H/o HAZEL  Plan: unable to tolerate CPAP      Obesity  Plan: complicates care, lifestyle modifications encouraged      Mood d/o  Plan: continue lexapro and wellbutrin      Anticipated discharge needs:      Pending PT/OT eval    Diet:  ADULT DIET; Regular; Low Fat/Low Chol/High Fiber/KHARI  DVT PPx: Xarelto  Code status: Full Code    Hospital Problems:  Principal Problem:    Recurrent falls  Active Problems:    Sinus bradycardia by electrocardiogram    History of fall    Near syncope    PAD (peripheral artery disease) (formerly [facility])  Resolved Problems:    * No resolved hospital problems. *      Objective:   Patient Vitals for the past 24 hrs:   Temp Pulse Resp BP SpO2   09/28/22 1555 98.1 °F (36.7 °C) 59 17 104/79 --   09/28/22 1138 97.7 °F (36.5 °C) 54 17 (!) 104/49 (!) 87 %   09/28/22 0747 97.7 °F (36.5 °C) (!) 48 17 (!) 98/47 94 %   09/28/22 0344 97.7 °F (36.5 °C) 50 14 (!) 86/48 96 %   09/28/22 0101 97.7 °F (36.5 °C) (!) 49 14 (!) 92/46 91 %       Oxygen Therapy  SpO2: (!) 87 %  O2 Device: None (Room air)    Estimated body mass index is 32.42 kg/m² as calculated from the following:    Height as of this encounter: 5' (1.524 m). Weight as of this encounter: 166 lb (75.3 kg). Intake/Output Summary (Last 24 hours) at 9/28/2022 1625  Last data filed at 9/28/2022 1138  Gross per 24 hour   Intake 440 ml   Output 200 ml   Net 240 ml         Physical Exam:     Blood pressure 104/79, pulse 59, temperature 98.1 °F (36.7 °C), temperature source Oral, resp.  rate 17, height 5' (1.524 m), weight 166 lb (75.3 kg), SpO2 (!) 87 %. General:    Well nourished. obese  Head:  Normocephalic, atraumatic  Eyes:  Sclerae appear normal.  Pupils equally round. ENT:  Nares appear normal, no drainage. Moist oral mucosa  Neck:  No restricted ROM. Trachea midline   CV:   RRR. No m/r/g. No jugular venous distension. Lungs:   CTAB. No wheezing, rhonchi, or rales. Symmetric expansion. Abdomen: Bowel sounds present. Soft, nontender, nondistended. Extremities: No cyanosis or clubbing. No edema  Skin:     No rashes and normal coloration. Warm and dry. Neuro:  Intention tremor  Psych:  Normal mood and affect.       I have personally reviewed labs and tests showing:  Recent Labs:  Recent Results (from the past 48 hour(s))   EKG 12 Lead    Collection Time: 09/27/22  5:45 PM   Result Value Ref Range    Ventricular Rate 51 BPM    Atrial Rate 51 BPM    P-R Interval 138 ms    QRS Duration 82 ms    Q-T Interval 456 ms    QTc Calculation (Bazett) 420 ms    P Axis 31 degrees    R Axis 52 degrees    T Axis 14 degrees    Diagnosis Sinus bradycardia    CBC with Auto Differential    Collection Time: 09/27/22  5:54 PM   Result Value Ref Range    WBC 6.9 4.3 - 11.1 K/uL    RBC 4.66 4.05 - 5.2 M/uL    Hemoglobin 15.2 11.7 - 15.4 g/dL    Hematocrit 44.5 35.8 - 46.3 %    MCV 95.5 79.6 - 97.8 FL    MCH 32.6 26.1 - 32.9 PG    MCHC 34.2 31.4 - 35.0 g/dL    RDW 12.2 11.9 - 14.6 %    Platelets 230 395 - 350 K/uL    MPV 9.0 (L) 9.4 - 12.3 FL    nRBC 0.00 0.0 - 0.2 K/uL    Differential Type AUTOMATED      Seg Neutrophils 61 43 - 78 %    Lymphocytes 24 13 - 44 %    Monocytes 12 4.0 - 12.0 %    Eosinophils % 2 0.5 - 7.8 %    Basophils 1 0.0 - 2.0 %    Immature Granulocytes 0 0.0 - 5.0 %    Segs Absolute 4.2 1.7 - 8.2 K/UL    Absolute Lymph # 1.7 0.5 - 4.6 K/UL    Absolute Mono # 0.8 0.1 - 1.3 K/UL    Absolute Eos # 0.2 0.0 - 0.8 K/UL    Basophils Absolute 0.0 0.0 - 0.2 K/UL    Absolute Immature Granulocyte 0.0 0.0 - 0.5 K/UL   Comprehensive Metabolic Panel    Collection Time: 09/27/22  5:54 PM   Result Value Ref Range    Sodium 134 (L) 136 - 145 mmol/L    Potassium 4.6 3.5 - 5.1 mmol/L    Chloride 102 101 - 110 mmol/L    CO2 26 21 - 32 mmol/L    Anion Gap 6 4 - 13 mmol/L    Glucose 93 65 - 100 mg/dL    BUN 16 8 - 23 MG/DL    Creatinine 1.00 0.6 - 1.0 MG/DL    GFR African American >60 >60 ml/min/1.73m2    GFR Non- 58 (L) >60 ml/min/1.73m2    Calcium 10.2 8.3 - 10.4 MG/DL    Total Bilirubin 0.5 0.2 - 1.1 MG/DL    ALT 25 12 - 65 U/L    AST 16 15 - 37 U/L    Alk Phosphatase 77 50 - 136 U/L    Total Protein 7.3 6.3 - 8.2 g/dL    Albumin 3.7 3.2 - 4.6 g/dL    Globulin 3.6 (H) 2.3 - 3.5 g/dL    Albumin/Globulin Ratio 1.0 (L) 1.2 - 3.5     Basic Metabolic Panel    Collection Time: 09/28/22  5:02 AM   Result Value Ref Range    Sodium 136 136 - 145 mmol/L    Potassium 4.8 3.5 - 5.1 mmol/L    Chloride 105 101 - 110 mmol/L    CO2 29 21 - 32 mmol/L    Anion Gap 2 (L) 4 - 13 mmol/L    Glucose 79 65 - 100 mg/dL    BUN 18 8 - 23 MG/DL    Creatinine 1.21 (H) 0.6 - 1.0 MG/DL    GFR  57 (L) >60 ml/min/1.73m2    GFR Non- 47 (L) >60 ml/min/1.73m2    Calcium 9.0 8.3 - 10.4 MG/DL   C-Reactive Protein    Collection Time: 09/28/22  5:02 AM   Result Value Ref Range    CRP 0.6 0.0 - 0.9 mg/dL   CK    Collection Time: 09/28/22  5:02 AM   Result Value Ref Range    Total CK 74 21 - 215 U/L   Magnesium    Collection Time: 09/28/22  5:02 AM   Result Value Ref Range    Magnesium 2.1 1.8 - 2.4 mg/dL   Sedimentation Rate    Collection Time: 09/28/22  5:02 AM   Result Value Ref Range    Sed Rate, Automated 14 0 - 30 mm/hr   Troponin    Collection Time: 09/28/22  5:02 AM   Result Value Ref Range    Troponin, High Sensitivity 8.4 0 - 14 pg/mL   TSH with Reflex    Collection Time: 09/28/22  5:02 AM   Result Value Ref Range    TSH w Free Thyroid if Abnormal 2.80 0.358 - 3.740 UIU/ML   Vitamin B12    Collection Time: 09/28/22  5:02 AM   Result Value Ref Range    Vitamin B-12 5622 (H) 193 - 986 pg/mL   Brain Natriuretic Peptide    Collection Time: 09/28/22  5:02 AM   Result Value Ref Range    NT Pro- (H) 5 - 125 PG/ML   Transthoracic echocardiogram (TTE) complete with contrast, bubble, strain, and 3D PRN    Collection Time: 09/28/22  9:00 AM   Result Value Ref Range    LV EDV A2C 108 mL    LV EDV A4C 116 mL    LV ESV A2C 38 mL    LV ESV A4C 43 mL    IVSd 0.8 0.6 - 0.9 cm    LVIDd 4.4 3.9 - 5.3 cm    LVIDs 3.1 cm    LVOT Diameter 2.0 cm    LVOT Mean Gradient 2 mmHg    LVOT VTI 24.9 cm    LVOT Peak Velocity 1.1 m/s    LVOT Peak Gradient 4 mmHg    LVPWd 1.0 (A) 0.6 - 0.9 cm    LV E' Lateral Velocity 10 cm/s    LV E' Septal Velocity 5 cm/s    LV Ejection Fraction A2C 65 %    LV Ejection Fraction A4C 63 %    EF BP 64 55 - 100 %    LVOT Area 3.1 cm2    LVOT SV 78.2 ml    LA Minor Axis 5.7 cm    LA Major [place] 6.2 cm    LA Area 2C 23.6 cm2    LA Area 4C 27.8 cm2    LA Volume BP 94 (A) 22 - 52 mL    LA Diameter 4.9 cm    AV Mean Gradient 4 mmHg    AV VTI 33.1 cm    AV Mean Velocity 0.9 m/s    AV Peak Velocity 1.3 m/s    AV Peak Gradient 7 mmHg    AV Area by VTI 2.4 cm2    AV Area by Peak Velocity 2.6 cm2    Aortic Root 2.5 cm    Ascending Aorta 2.9 cm    MV E Wave Deceleration Time 159.0 ms    MV A Velocity 0.92 m/s    MV E Velocity 0.92 m/s    MV Mean Gradient 1 mmHg    MV VTI 35.0 cm    MV Mean Velocity 0.5 m/s    MV Max Velocity 1.1 m/s    MV Peak Gradient 5 mmHg    MV Area by VTI 2.2 cm2    PV .0 ms    PV Max Velocity 1.0 m/s    PV Peak Gradient 4 mmHg    RV Basal Dimension 3.7 cm    TAPSE 1.8 1.7 cm    TR Max Velocity 2.21 m/s    TR Peak Gradient 20 mmHg    Body Surface Area 1.79 m2    Fractional Shortening 2D 30 28 - 44 %    LV ESV Index A4C 25 mL/m2    LV EDV Index A4C 67 mL/m2    LV ESV Index A2C 22 mL/m2    LV EDV Index A2C 63 mL/m2    LVIDd Index 2.56 cm/m2    LVIDs Index 1.80 cm/m2    LV RWT Ratio 0.45     LV Mass 2D 128.0 67 - 162 g    LV Mass 2D Index 74.4 43 - 95 g/m2    MV E/A 1.00     E/E' Ratio (Averaged) 13.80     E/E' Lateral 9.20     E/E' Septal 18.40     LA Volume Index BP 55 (A) 16 - 34 ml/m2    LVOT Stroke Volume Index 45.5 mL/m2    LA Size Index 2.85 cm/m2    LA/AO Root Ratio 1.96     Ao Root Index 1.45 cm/m2    Ascending Aorta Index 1.69 cm/m2    AV Velocity Ratio 0.85     LVOT:AV VTI Index 0.75     BETO/BSA VTI 1.4 cm2/m2    BETO/BSA Peak Velocity 1.5 cm2/m2    MV:LVOT VTI Index 1.41     Est. RA Pressure 3 mmHg    RVSP 23 mmHg       I have personally reviewed imaging studies showing: Other Studies:  Vascular duplex carotid bilateral   Final Result   1. Less than 50% stenosis of the right internal carotid artery. 2. Less than 50% stenosis of the left internal carotid artery. 3. Bilateral external carotid artery stenoses             Current Meds:  Current Facility-Administered Medications   Medication Dose Route Frequency    buPROPion [employer] SR) extended release tablet 150 mg  150 mg Oral BID    sodium chloride flush 0.9 % injection 5-40 mL  5-40 mL IntraVENous PRN    escitalopram (LEXAPRO) tablet 20 mg  20 mg Oral Daily    sodium chloride flush 0.9 % injection 5-40 mL  5-40 mL IntraVENous 2 times per day    0.9 % sodium chloride infusion   IntraVENous PRN    acetaminophen (TYLENOL) tablet 650 mg  650 mg Oral Q6H PRN    Or    acetaminophen (TYLENOL) suppository 650 mg  650 mg Rectal Q6H PRN    ondansetron (ZOFRAN-ODT) disintegrating tablet 4 mg  4 mg Oral Q8H PRN    Or    ondansetron (ZOFRAN) injection 4 mg  4 mg IntraVENous Q6H PRN    polyethylene glycol (GLYCOLAX) packet 17 g  17 g Oral Daily PRN       Signed:  Yara Ireland MD    Part of this note may have been written by using a voice dictation software. The note has been proof read but may still contain some grammatical/other typographical errors.

## 2022-09-28 NOTE — H&P
Hospitalist History and Physical   Admit Date:  2022  7:20 PM   Name:  Frances Soni   Age:  81 y.o. Sex:  female  :  1950   MRN:  [id number]   Room:  [place] [address]    Presenting Complaint: Fall     Reason(s) for Admission: Recurrent falls [R29.6]     History of Present Illness:   Frances Soni is a 81 y.o. female with medical history of obesity, former smoker, PAD s/p BL iliac stents, CAD s/p 4v- CABG, CHF who presented with c/o falls, dizziniess/lightheadedness, and worsening tremors. She fell on on Saturday and another fall today with head injury. Reports getting dizzy/lightheaded, grabbed the door and then fell. Takes Xarelto. In ED, BP wide pulse pressure. HR 49. CXR pulmonary vascular congestion, trace interstitial edema and trace BL pleural effusions. CT head chronic microangiopathy. C spine no acute. Lumbar XR with degenerative changes, no acute fracture. XR pelvis - no acute findings. CBC unremarkable. CMP with [facility]. Rec'd dilaudid and NS IVF bolus. Daughter at beside. She recently moved in with her daughter who has noticed a shuffling gait and worsening tremors. Tremors wake patient up at night, occur both at rest and with movement. She has seen Dr Fredi Yepez in the past who recommended drinking a little wine at bedtime for tremors and that if atenolol was stopped, she'd likely have worsening tremors. Pt states she hasn't felt well in awhile. Eating and drinking normally. No dysuria or difficulty urinating. Admits to finger tip numbness/tingling ongoing for years. Constipaton intermittently. Has not taken lasix PO in a long time. She is suppose to FU with DR dean in the AM.     Review of Systems:  10 systems reviewed and negative except as noted in HPI. Assessment & Plan:     Recurrent falls // dizziness/lightheadedness- unclear if orthostatic vs cardio vs neurogenic etiology. Admit to remote tele. PT/OT, PPD. Rec'ing 1 L NS bolus. Hold atelenolol. Consult card.  Check tsh, mag, ck, crp, esr, TTE, carotid duplex. Orthostatics daily. Bradycardia - hold atenolol. Unclear baseline. Telemetry. Consult cardiology. CAD - f/b Dr. Adrianne Dupree with [facility] cardiology. Takes xarelto 2.5 mg daily plus atorvastatin 80 qhs with prn nitro. PAD s/p iliac stents, bilateral - imaging notable for systemic atherosclerosis. Check carotid duplex. Cont. Xarelto. CHF - interstitial edema on CXR. Clinically dry to euvolemic. check BNP and TTE. Hyponatremia - rec'ing 1 L NS. Trend. Check TSH. Neuropathy - check b12 in AM    Mood d/o - lexapro/wellbutrin    H/o HAZEL - reportedly intolerate to CPAP    Former smoker - quit in early 2000s. Obesity - h/o gastric bypass. Noted. Adds to complexity. Check b12    Anticipated discharge needs:     HH vs STR     Diet: ADULT DIET; Regular; Low Fat/Low Chol/High Fiber/KHARI  VTE ppx: xarelto   Code status: Full Code    Hospital Problems:  Principal Problem:    Recurrent falls  Active Problems:    Weakness of both lower extremities    Essential tremor    Class 1 obesity with alveolar hypoventilation, serious comorbidity, and body mass index (BMI) of 32.0 to 32.9 in adult (HCC)    HAZEL (obstructive sleep apnea)    Hyponatremia    Bradycardia    Dizziness of unknown cause    Mood disorder (HCC)    Dyslipidemia    Hx of CABG    Coronary atherosclerosis of native coronary artery    S/P coronary artery stent placement    LVH (left ventricular hypertrophy)    S/P peripheral artery angioplasty    PVD (peripheral vascular disease) with claudication ([facility])  Resolved Problems:    * No resolved hospital problems.  *       Past History:     Past Medical History:   Diagnosis Date    Adverse effect of anesthesia     hard to wake after    Angina pectoris ([facility] [address] 10/13/2015    Arthritis     hands & hips    CAD (coronary artery disease)     5/06 MI and 4 vessel CABG, patient states she has angina pain-last time takingNTG 2 week ago    Chest pain, unspecified 5/27/2011    Coronary atherosclerosis of native coronary artery 10/13/2015    Dyslipidemia 2011    Dyspnea 10/13/2015    Essential tremor 2022    Extremity atherosclerosis with intermittent claudication ([facility] [address] 10/13/2015    GERD (gastroesophageal reflux disease)     tums    Heart failure (HCC)     Hx of CABG 2011    LIMA and 3 SVG. 2 OMs/PLV and one acute marginal of RCA     Hypertension     LVH (left ventricular hypertrophy) 10/13/2015    MI, old     Peripheral arterial disease ([facility] [address] 2012    Pleural effusion 10/13/2015    Psychiatric disorder     S/P coronary artery stent placement 2011    2.0 x 15mm Xience to OM2-11     S/P peripheral artery angioplasty 2012    Severe obesity (BMI 35.0-39. 9) 2018    Sleep apnea     does not wear cpap- weight loss    Tobacco use disorder 10/13/2015    Unspecified adverse effect of anesthesia     slow to wake up       Past Surgical History:   Procedure Laterality Date    APPENDECTOMY      BREAST SURGERY      biopsy left    CHOLECYSTECTOMY      09    GASTRIC BYPASS SURGERY      gastric bypass    HYSTERECTOMY (CERVIX STATUS UNKNOWN)       partial hyst.,bilat oopherectomy    OVARY REMOVAL      MT CARDIAC SURG PROCEDURE UNLIST      4 vessel CABG - stent x3    UROLOGICAL SURGERY      bladder tack    VASCULAR SURGERY Bilateral 2018    iliac angio and stent        Social History     Tobacco Use    Smoking status: Former     Types: Cigarettes     Quit date: 2006     Years since quittin.3    Smokeless tobacco: Never    Tobacco comments:     Quit smokin/06   Substance Use Topics    Alcohol use:  Yes     Alcohol/week: 0.8 standard drinks      Social History     Substance and Sexual Activity   Drug Use No       Family History   Problem Relation Age of Onset    Heart Attack Father     Heart Disease Mother     Colon Cancer Neg Hx     Heart Disease Brother         Immunization History   Administered Date(s) Administered    COVID-19, PFIZER PURPLE top, DILUTE for use, (age 15 y+), 30mcg/0.3mL 03/18/2021, 04/12/2021    Influenza, FLUARIX, FLULAVAL, FLUZONE (age 10 mo+) AND AFLURIA, (age 1 y+), PF, 0.5mL 11/13/2018    Pneumococcal Vaccine 06/30/2006     Allergies   Allergen Reactions    Penicillins Hives     Has taken penicillin without any reactions - states she is no longer allergic     Adhesive Tape Rash     Paper tape ok    Codeine Nausea And Vomiting     Prior to Admit Medications:  Current Outpatient Medications   Medication Instructions    atenolol (TENORMIN) 25 mg, Oral, 2 TIMES DAILY    atorvastatin (LIPITOR) 80 mg, Oral    Biotin 10 MG CAPS Oral    escitalopram (LEXAPRO) 20 mg, Oral, DAILY    furosemide (LASIX) 40 mg, Oral, DAILY    nitroGLYCERIN (NITROSTAT) 0.4 MG SL tablet Place 1 sl under the tongue q 5 min prn cp, max 3 sl in a 15-min time period. Call 911 if no relief after the 3rd sl. potassium gluconate 595 mg, Oral    rivaroxaban (XARELTO) 2.5 mg, Oral, 2 TIMES DAILY WITH MEALS         Objective:   Patient Vitals for the past 24 hrs:   Temp Pulse Resp BP SpO2   09/27/22 2144 -- 50 14 -- 94 %   09/27/22 2022 -- (!) 49 13 -- 95 %   09/27/22 2015 -- -- 16 (!) 119/52 --   09/27/22 1740 98.6 °F (37 °C) 53 18 (!) 134/55 92 %       Oxygen Therapy  SpO2: 94 %  O2 Device: None (Room air)    Estimated body mass index is 32.42 kg/m² as calculated from the following:    Height as of this encounter: 5' (1.524 m). Weight as of this encounter: 166 lb (75.3 kg). No intake or output data in the 24 hours ending 09/27/22 2210      Physical Exam:    Blood pressure (!) 119/52, pulse 50, temperature 98.6 °F (37 °C), temperature source Oral, resp. rate 14, height 5' (1.524 m), weight 166 lb (75.3 kg), SpO2 94 %. Physical Exam  Vitals and nursing note reviewed. Constitutional:       General: She is not in acute distress. Appearance: She is obese. HENT:      Head: Normocephalic. Nose: Nose normal.      Mouth/Throat:      Mouth: Mucous membranes are dry. Eyes:      Extraocular Movements: Extraocular movements intact. Right eye: Nystagmus present. Left eye: Nystagmus present. Conjunctiva/sclera: Conjunctivae normal.      Pupils: Pupils are equal, round, and reactive to light. Cardiovascular:      Rate and Rhythm: Regular rhythm. Bradycardia present. Heart sounds: No murmur heard. Pulmonary:      Effort: Pulmonary effort is normal.      Breath sounds: Normal breath sounds. No wheezing or rales. Abdominal:      General: Abdomen is flat. Bowel sounds are normal. There is no distension. Palpations: Abdomen is soft. Tenderness: There is abdominal tenderness. Musculoskeletal:      Cervical back: Neck supple. Right lower leg: No edema. Left lower leg: No edema. Comments: MS 3/4 BLE   Skin:     General: Skin is warm. Findings: No rash. Neurological:      General: No focal deficit present. Mental Status: She is alert and oriented to person, place, and time. Mental status is at baseline. Motor: Weakness and tremor present.    Psychiatric:         Mood and Affect: Mood normal.         Behavior: Behavior normal.         I have personally reviewed labs and tests showing:  Recent Labs:  Recent Results (from the past 24 hour(s))   EKG 12 Lead    Collection Time: 09/27/22  5:45 PM   Result Value Ref Range    Ventricular Rate 51 BPM    Atrial Rate 51 BPM    P-R Interval 138 ms    QRS Duration 82 ms    Q-T Interval 456 ms    QTc Calculation (Bazett) 420 ms    P Axis 31 degrees    R Axis 52 degrees    T Axis 14 degrees    Diagnosis Sinus bradycardia    CBC with Auto Differential    Collection Time: 09/27/22  5:54 PM   Result Value Ref Range    WBC 6.9 4.3 - 11.1 K/uL    RBC 4.66 4.05 - 5.2 M/uL    Hemoglobin 15.2 11.7 - 15.4 g/dL    Hematocrit 44.5 35.8 - 46.3 %    MCV 95.5 79.6 - 97.8 FL    MCH 32.6 26.1 - 32.9 PG    MCHC 34.2 31.4 - 35.0 g/dL    RDW 12.2 11.9 - 14.6 %    Platelets 260 040 - 012 K/uL    MPV 9.0 (L) 9.4 - 12.3 FL    nRBC 0.00 0.0 - 0.2 K/uL    Differential Type AUTOMATED      Seg Neutrophils 61 43 - 78 %    Lymphocytes 24 13 - 44 %    Monocytes 12 4.0 - 12.0 %    Eosinophils % 2 0.5 - 7.8 %    Basophils 1 0.0 - 2.0 %    Immature Granulocytes 0 0.0 - 5.0 %    Segs Absolute 4.2 1.7 - 8.2 K/UL    Absolute Lymph # 1.7 0.5 - 4.6 K/UL    Absolute Mono # 0.8 0.1 - 1.3 K/UL    Absolute Eos # 0.2 0.0 - 0.8 K/UL    Basophils Absolute 0.0 0.0 - 0.2 K/UL    Absolute Immature Granulocyte 0.0 0.0 - 0.5 K/UL   Comprehensive Metabolic Panel    Collection Time: 09/27/22  5:54 PM   Result Value Ref Range    Sodium 134 (L) 136 - 145 mmol/L    Potassium 4.6 3.5 - 5.1 mmol/L    Chloride 102 101 - 110 mmol/L    CO2 26 21 - 32 mmol/L    Anion Gap 6 4 - 13 mmol/L    Glucose 93 65 - 100 mg/dL    BUN 16 8 - 23 MG/DL    Creatinine 1.00 0.6 - 1.0 MG/DL    GFR African American >60 >60 ml/min/1.73m2    GFR Non- 58 (L) >60 ml/min/1.73m2    Calcium 10.2 8.3 - 10.4 MG/DL    Total Bilirubin 0.5 0.2 - 1.1 MG/DL    ALT 25 12 - 65 U/L    AST 16 15 - 37 U/L    Alk Phosphatase 77 50 - 136 U/L    Total Protein 7.3 6.3 - 8.2 g/dL    Albumin 3.7 3.2 - 4.6 g/dL    Globulin 3.6 (H) 2.3 - 3.5 g/dL    Albumin/Globulin Ratio 1.0 (L) 1.2 - 3.5         I have personally reviewed imaging studies showing:  XR LUMBAR SPINE (2-3 VIEWS)    Result Date: 9/27/2022  Lumbar spine  Pelvis Clinical indication: Acute traumatic fall injury with moderate pain involving the posterior low back in the pelvis, difficulty walking Comparison: Correlation with CT abdomen and pelvis 11/1/2018, radiography abdomen and pelvis 11/23/2009 Technique: 3 views lumbar spine, one view of pelvis Findings: Incidental note of diffuse aortic calcifications, bilateral iliac artery stents, not entirely evaluated on this exam.  Cholecystectomy clips in the upper abdomen. Lumbar spine: There is no evidence of acute fracture.   There is grade 1 anterolisthesis of L4 on L5 which is slightly increased from the prior CT. Associated moderate disc space narrowing at this level, with mild to moderate chronic multilevel degenerative changes and facet arthropathy. No aggressive lytic or blastic osseous lesion. Pelvis: No acute fracture or dislocation. No fracture. XR PELVIS (1-2 VIEWS)    Result Date: 9/27/2022  Lumbar spine  Pelvis Clinical indication: Acute traumatic fall injury with moderate pain involving the posterior low back in the pelvis, difficulty walking Comparison: Correlation with CT abdomen and pelvis 11/1/2018, radiography abdomen and pelvis 11/23/2009 Technique: 3 views lumbar spine, one view of pelvis Findings: Incidental note of diffuse aortic calcifications, bilateral iliac artery stents, not entirely evaluated on this exam.  Cholecystectomy clips in the upper abdomen. Lumbar spine: There is no evidence of acute fracture. There is grade 1 anterolisthesis of L4 on L5 which is slightly increased from the prior CT. Associated moderate disc space narrowing at this level, with mild to moderate chronic multilevel degenerative changes and facet arthropathy. No aggressive lytic or blastic osseous lesion. Pelvis: No acute fracture or dislocation. No fracture. CT HEAD WO CONTRAST    Result Date: 9/27/2022  Noncontrast head CT Clinical Indication: Recurrent fall and injuries, subacute, dizziness, difficulty walking, moderate bilateral frontal head pain. Technique: Noncontrast axial images were obtained through the brain. All CT scans at this location are performed using dose modulation techniques as appropriate including the following: Automated exposure control, adjustment of the MA and/or kV according to patient's size, or use of iterative reconstruction technique. Reformatted sagittal, coronal images obtained to further evaluate bones, soft tissues, extra-axial spaces. Comparison: None available Findings:  There is no acute intracranial hemorrhage, hydrocephalus, intra-axial mass, or mass-effect. There are mild to moderate bilateral periventricular white matter hypodensities, nonspecific but not unusual for age and most often chronic microangiopathy. There is no CT evidence of acute large artery territorial infarction or abnormal extra-axial fluid collection. The mastoid air cells and paranasal sinuses are clear where imaged. No displaced skull fractures are present. Small area of soft tissue swelling noted of the right frontoparietal scalp. No CT evidence of acute intracranial abnormality. CT CERVICAL SPINE WO CONTRAST    Result Date: 9/27/2022  CT of the Cervical Spine INDICATION:  Recurrent fall and injuries, subacute, dizziness, difficulty walking, moderate posterior neck pain. COMPARISON: None TECHNIQUE: Dose reduction technique used: Automated exposure Control and/or adjustment of mA and kV according to patient size. Multiple axial images were obtained through the cervical spine without intravenous contrast. Coronal and sagittal reformatted images were also reviewed for further evaluation of osseous structures. FINDINGS: There is no evidence of fracture or subluxation. No focal suspicious osseous lesions are seen. There is no prevertebral soft tissue swelling. Note of diffuse calcifications of the visualized aortic arch and its branches, as well as bilateral carotid arteries. Lung apices are clear as seen. Partially visualized sternotomy changes. No acute osseous abnormality. XR CHEST PORTABLE    Result Date: 9/27/2022  Chest portable CLINICAL INDICATION: Acute fall injury with dizziness, severe weakness, difficulty walking COMPARISON: 11/24/2009 chest radiograph, also CTA abdomen 11/1/2018 correlation TECHNIQUE: single AP portable view chest at 6:00 PM upright FINDINGS: Stable mediastinal and hilar contours, cardiac surgical changes again noted. Mild bilateral pulmonary vascular congestion and likely trace interstitial edema.   Probable trace pleural effusions in both bases. No evidence of a dense consolidation, prominent pleural effusion, or pneumothorax. 1.  No consolidation. 2.  Pulmonary vascular congestion, prior heart surgery. Echocardiogram:  No results found for this or any previous visit. Orders Placed This Encounter   Medications    HYDROmorphone HCl PF (DILAUDID) injection 0.5 mg    cyclobenzaprine (FLEXERIL) tablet 10 mg    ondansetron (ZOFRAN) injection 4 mg    0.9 % sodium chloride bolus    sodium chloride flush 0.9 % injection 5-40 mL    sodium chloride flush 0.9 % injection 5-40 mL    0.9 % sodium chloride infusion    OR Linked Order Group     ondansetron (ZOFRAN-ODT) disintegrating tablet 4 mg     ondansetron (ZOFRAN) injection 4 mg    polyethylene glycol (GLYCOLAX) packet 17 g    OR Linked Order Group     acetaminophen (TYLENOL) tablet 650 mg     acetaminophen (TYLENOL) suppository 650 mg    tuberculin injection 5 Units    buPROPion (WELLBUTRIN SR) extended release tablet 150 mg    escitalopram (LEXAPRO) tablet 20 mg         Signed:  Jaylon Jimenez DO, DO    Part of this note may have been written by using a voice dictation software. The note has been proof read but may still contain some grammatical/other typographical errors.

## 2022-09-28 NOTE — CARE COORDINATION
Medical record reviewed. Patient is a 81 yr old female admitted for evaluation of current falls. CM met with patient to introduce self and explain role in planning. Prior to admission, pt was living independently with her spouse at their dtrs home. They recently moved (one month ago) to be closer to family. Pt is independent at baseline and still drives. Her spouse's health has been declining in recent months. Pt's dtr has recently began to notice gait instability and tremors. Pt has fallen twice recently. Discharge needs are uncertain at this time. CM will continue to follow to assist with planning. 09/28/22 8263   Service Assessment   Patient Orientation Alert and Oriented;Person;Place;Situation   Cognition Alert   History Provided By Patient   Primary Caregiver Self   Support Systems Spouse/Significant Other;Children   Prior Functional Level Independent in ADLs/IADLs   Current Functional Level Assistance with the following:;Cooking;Housework; Shopping;Mobility   Can patient return to prior living arrangement Unknown at present   Ability to make needs known: Good   Family able to assist with home care needs: Yes   Social/Functional History   Lives With Spouse; Family   Type of [address] [place], [place] [address]way [address] [place] [address] Assistance Independent   Homemaking Assistance Independent   Ambulation Assistance Independent   Transfer Assistance Independent   Active  Yes   Mode of Transportation Car   Occupation Retired   Discharge Planning   Type of [address] Prior To Admission None   Potential Assistance Purchasing Medications No   Type of [address] None   Patient expects to be discharged to: [facility] [address] Discharge   Transition of [address] (CM Consult) Discharge Planning   Mode of Transport at Discharge Self   Confirm Follow Up Transport Self   Condition of Participation: Discharge [address] for Transition of Care is related to the following treatment goals: return to prior level of care

## 2022-09-29 ENCOUNTER — TELEPHONE (OUTPATIENT)
Dept: CARDIOLOGY CLINIC | Age: 72
End: 2022-09-29

## 2022-09-29 ENCOUNTER — HOME HEALTH ADMISSION (OUTPATIENT)
Dept: HOME HEALTH SERVICES | Facility: HOME HEALTH | Age: 72
End: 2022-09-29
Payer: MEDICARE

## 2022-09-29 VITALS
WEIGHT: 166 LBS | OXYGEN SATURATION: 90 % | RESPIRATION RATE: 17 BRPM | HEIGHT: 60 IN | DIASTOLIC BLOOD PRESSURE: 48 MMHG | BODY MASS INDEX: 32.59 KG/M2 | SYSTOLIC BLOOD PRESSURE: 104 MMHG | TEMPERATURE: 97.9 F | HEART RATE: 53 BPM

## 2022-09-29 LAB
ANION GAP SERPL CALC-SCNC: 7 MMOL/L (ref 4–13)
BASOPHILS # BLD: 0.1 K/UL (ref 0–0.2)
BASOPHILS NFR BLD: 1 % (ref 0–2)
BUN SERPL-MCNC: 14 MG/DL (ref 8–23)
CALCIUM SERPL-MCNC: 8.7 MG/DL (ref 8.3–10.4)
CHLORIDE SERPL-SCNC: 102 MMOL/L (ref 101–110)
CO2 SERPL-SCNC: 28 MMOL/L (ref 21–32)
CREAT SERPL-MCNC: 0.92 MG/DL (ref 0.6–1)
DIFFERENTIAL METHOD BLD: ABNORMAL
EOSINOPHIL # BLD: 0.2 K/UL (ref 0–0.8)
EOSINOPHIL NFR BLD: 4 % (ref 0.5–7.8)
ERYTHROCYTE [DISTWIDTH] IN BLOOD BY AUTOMATED COUNT: 11.9 % (ref 11.9–14.6)
GLUCOSE SERPL-MCNC: 86 MG/DL (ref 65–100)
HCT VFR BLD AUTO: 39.9 % (ref 35.8–46.3)
HGB BLD-MCNC: 13.4 G/DL (ref 11.7–15.4)
IMM GRANULOCYTES # BLD AUTO: 0 K/UL (ref 0–0.5)
IMM GRANULOCYTES NFR BLD AUTO: 1 % (ref 0–5)
LYMPHOCYTES # BLD: 1.5 K/UL (ref 0.5–4.6)
LYMPHOCYTES NFR BLD: 24 % (ref 13–44)
MCH RBC QN AUTO: 31.5 PG (ref 26.1–32.9)
MCHC RBC AUTO-ENTMCNC: 33.6 G/DL (ref 31.4–35)
MCV RBC AUTO: 93.9 FL (ref 79.6–97.8)
MONOCYTES # BLD: 0.9 K/UL (ref 0.1–1.3)
MONOCYTES NFR BLD: 14 % (ref 4–12)
NEUTS SEG # BLD: 3.4 K/UL (ref 1.7–8.2)
NEUTS SEG NFR BLD: 57 % (ref 43–78)
NRBC # BLD: 0 K/UL (ref 0–0.2)
PLATELET # BLD AUTO: 184 K/UL (ref 150–450)
PMV BLD AUTO: 8.7 FL (ref 9.4–12.3)
POTASSIUM SERPL-SCNC: 4 MMOL/L (ref 3.5–5.1)
RBC # BLD AUTO: 4.25 M/UL (ref 4.05–5.2)
SODIUM SERPL-SCNC: 137 MMOL/L (ref 136–145)
WBC # BLD AUTO: 6 K/UL (ref 4.3–11.1)

## 2022-09-29 PROCEDURE — 6370000000 HC RX 637 (ALT 250 FOR IP): Performed by: FAMILY MEDICINE

## 2022-09-29 PROCEDURE — 2580000003 HC RX 258: Performed by: FAMILY MEDICINE

## 2022-09-29 PROCEDURE — 6360000002 HC RX W HCPCS: Performed by: FAMILY MEDICINE

## 2022-09-29 PROCEDURE — 36415 COLL VENOUS BLD VENIPUNCTURE: CPT

## 2022-09-29 PROCEDURE — 6370000000 HC RX 637 (ALT 250 FOR IP): Performed by: INTERNAL MEDICINE

## 2022-09-29 PROCEDURE — 85025 COMPLETE CBC W/AUTO DIFF WBC: CPT

## 2022-09-29 PROCEDURE — 97530 THERAPEUTIC ACTIVITIES: CPT

## 2022-09-29 PROCEDURE — G0378 HOSPITAL OBSERVATION PER HR: HCPCS

## 2022-09-29 PROCEDURE — 99225 PR SBSQ OBSERVATION CARE/DAY 25 MINUTES: CPT | Performed by: INTERNAL MEDICINE

## 2022-09-29 PROCEDURE — 80048 BASIC METABOLIC PNL TOTAL CA: CPT

## 2022-09-29 PROCEDURE — 96374 THER/PROPH/DIAG INJ IV PUSH: CPT

## 2022-09-29 PROCEDURE — 97535 SELF CARE MNGMENT TRAINING: CPT

## 2022-09-29 RX ORDER — FUROSEMIDE 10 MG/ML
40 INJECTION INTRAMUSCULAR; INTRAVENOUS ONCE
Status: COMPLETED | OUTPATIENT
Start: 2022-09-29 | End: 2022-09-29

## 2022-09-29 RX ORDER — BUPROPION HYDROCHLORIDE 150 MG/1
150 TABLET, EXTENDED RELEASE ORAL 2 TIMES DAILY
Qty: 60 TABLET | Refills: 0 | Status: SHIPPED | OUTPATIENT
Start: 2022-09-29 | End: 2022-10-14 | Stop reason: DRUGHIGH

## 2022-09-29 RX ORDER — ASPIRIN 81 MG/1
81 TABLET ORAL DAILY
Qty: 30 TABLET | Refills: 0 | Status: SHIPPED | OUTPATIENT
Start: 2022-09-29 | End: 2022-10-29

## 2022-09-29 RX ORDER — FUROSEMIDE 20 MG/1
20 TABLET ORAL DAILY
Qty: 30 TABLET | Refills: 0 | Status: SHIPPED | OUTPATIENT
Start: 2022-09-29 | End: 2022-10-14

## 2022-09-29 RX ADMIN — RIVAROXABAN 2.5 MG: 2.5 TABLET, FILM COATED ORAL at 10:52

## 2022-09-29 RX ADMIN — SODIUM CHLORIDE, PRESERVATIVE FREE 10 ML: 5 INJECTION INTRAVENOUS at 10:52

## 2022-09-29 RX ADMIN — ESCITALOPRAM OXALATE 20 MG: 10 TABLET ORAL at 10:50

## 2022-09-29 RX ADMIN — ASPIRIN 81 MG: 81 TABLET, COATED ORAL at 10:49

## 2022-09-29 RX ADMIN — BUPROPION HYDROCHLORIDE 150 MG: 150 TABLET, EXTENDED RELEASE ORAL at 10:49

## 2022-09-29 RX ADMIN — FUROSEMIDE 40 MG: 10 INJECTION, SOLUTION INTRAMUSCULAR; INTRAVENOUS at 08:49

## 2022-09-29 ASSESSMENT — ENCOUNTER SYMPTOMS
ABDOMINAL DISTENTION: 0
EYE DISCHARGE: 0

## 2022-09-29 NOTE — CARE COORDINATION
Patient is medically stable and ready for discharge today. Patient will return home with her spouse and dtr. Therapy evals completed. Recommendations for HHPT, HHOT, and a rolling walker. CM met with pt and dtr to review HH/DME recommendations. Both are in agreement with plans. Home health agency choice is [facility]. Orders entered and referral initiated. No preference in providers for DME. Orders entered and referral initiated to [place] [address]. DME delivered to pts room by CM. No additional discharge planning needs noted. UPDATE: discharge address is [address] [place] [address] [place], [address] [facility].     09/28/22 1467   Service Assessment   Patient Orientation Alert and Oriented;Person;Place;Situation   Cognition Alert   History Provided By Patient   Primary Caregiver Self   Support Systems Spouse/Significant Other;Children   Prior Functional Level Independent in ADLs/IADLs   Current Functional Level Assistance with the following:;Cooking;Housework; Shopping;Mobility   Can patient return to prior living arrangement Unknown at present   Ability to make needs known: Good   Family able to assist with home care needs: Yes   Social/Functional History   Lives With Spouse; Family   Type of [address] Assistance Independent   Homemaking Assistance Independent   Ambulation Assistance Independent   Transfer Assistance Independent   Active  Yes   Mode of Transportation Car   Occupation Retired   Discharge Planning   Type of [address] Prior To Admission None   Potential Assistance Purchasing Medications No   Type of [address] None   Patient expects to be discharged to: [facility] Discharge   Transition of [address] (CM Consult) Discharge Planning   Mode of Transport at Discharge Self   Confirm Follow Up Transport Self   Condition of Participation: Discharge [address] for Transition of Care is related to the following treatment goals: return to prior level of care

## 2022-09-29 NOTE — PROGRESS NOTES
ACUTE PHYSICAL THERAPY GOALS:   (Developed with and agreed upon by patient and/or caregiver. )      LTG:  (1.)Ms. Adam Jensen will move from supine to sit and sit to supine  in bed with INDEPENDENT within 7 treatment day(s). (2.)Ms. Adam Jensen will transfer from bed to chair and chair to bed with SUPERVISION using the least restrictive device within 7 treatment day(s). (3.)Ms. Adam Jensen will ambulate with SUPERVISION for 300 feet with the least restrictive device within 7 treatment day(s). ________________________________________________________________________________________________     PHYSICAL THERAPY Initial Assessment and PM  (Link to Caseload Tracking: PT Visit Days : 2  Acknowledge Orders  Time In/Out  PT Charge Capture  Rehab Caseload Tracker    Irasema Sierra is a 81 y.o. female   PRIMARY DIAGNOSIS: Recurrent falls  Recurrent falls [R29.6]       Reason for Referral: Other abnormalities of gait and mobility (R26.89)  Repeated Falls (R29.6)  Observation: Payor: Vinay Allen / Plan: Martín Dsouza / Product Type: *No Product type* /     ASSESSMENT:     REHAB RECOMMENDATIONS:   Recommendation to date pending progress:  Setting:  STR vs HHPT- will need to further assess. If goes home will need family supervision for safety and possibly a RW due to poor balance and frequent falls    Equipment:    To Be Determined     ASSESSMENT:  Ms. Adam Jensen presents with repeated falls due to limited balance and dizziness and will benefit from PT to increase her safety and independence with mobility. She may need a RW at discharge and depending on how quickly she progresses she may need to be considered for STR. If she does go home she will need family supervision and HHPT. She transferred supine to sit and then stood and ambulated with HHA x 2 within the room before sitting up in the recliner. Instructed not to get up by herself. She is aware that her balance is poor and appeared open to therapy discharge suggestions. 9/29- pt up in chair on contact and dressed. States she is feeling better today. Was issued a walker by SW and she has never used one before. Worked on walking in [place] with RW, pt did well and was steady, did not have a loss of balance with RW. Walked 150 feet and back in room stayed up in recliner. Pt states she is going home today and she is ready to go. Recommend HHPT for follow up.       [address] [facility] [address] [id number] AM-PAC 6 Clicks Basic Mobility Inpatient Short Form  AM-PAC Mobility Inpatient   How much difficulty turning over in bed?: A Little  How much difficulty sitting down on / standing up from a chair with arms?: A Little  How much difficulty moving from lying on back to sitting on side of bed?: A Little  How much help from another person moving to and from a bed to a chair?: A Little  How much help from another person needed to walk in hospital room?: A Little  How much help from another person for climbing 3-5 steps with a railing?: A Little  [facility] Inpatient Mobility Raw Score : 18  AM-PAC Inpatient T-Scale Score : 43.63  Mobility Inpatient CMS 0-100% Score: 46.58  Mobility Inpatient CMS G-Code Modifier : CK    SUBJECTIVE:   Ms. Jo Ann Whitlock states, \"I am ready to sleep in my own bed tonight\"    Social/Functional Lives With: Spouse, Family  Type of Home: House  ADL Assistance: Independent  Homemaking Assistance: Independent  Ambulation Assistance: Independent  Transfer Assistance: Independent  Active : Yes  Mode of Transportation: Car  Occupation: Retired    OBJECTIVE:     PAIN: Pham Haggis / O2: PRECAUTION / Romain Ramirez / Deidra Jim:   Pre Treatment: 0/10         Post Treatment: 0 Vitals        Oxygen      None    RESTRICTIONS/PRECAUTIONS:  Restrictions/Precautions: Fall Risk                 GROSS EVALUATION: Intact Impaired (Comments):   AROM []  WFL   PROM []    Strength []  WFL    Balance []  Impaired - standing fair and dynamic poor   Posture []    Sensation [x]     Coordination [x]      Tone [x]     Edema [] Activity Tolerance []  Limited by fatigue     []      COGNITION/  PERCEPTION: Intact Impaired (Comments):   Orientation [x]     Vision [x]     Hearing [x]     Cognition  [x]       MOBILITY: I Mod I S SBA CGA Min Mod Max Total  NT x2 Comments:   Bed Mobility    Rolling [] [] [] [] [] [] [] [] [] [] []    Supine to Sit [] [] [] [] [] [] [] [] [] [] []    Scooting [] [] [] [] [] [] [] [] [] [] []    Sit to Supine [] [] [] [] [] [] [] [] [] [] []    Transfers    Sit to Stand [] [] [] [x] [] [] [] [] [] [] []    Bed to Chair [] [] [] [x] [] [] [] [] [] [] []    Stand to Sit [] [] [] [x] [] [] [] [] [] [] []     [] [] [] [] [] [] [] [] [] [] []    I=Independent, Mod I=Modified Independent, S=Supervision, SBA=Standby Assistance, CGA=Contact Guard Assistance,   Min=Minimal Assistance, Mod=Moderate Assistance, Max=Maximal Assistance, Total=Total Assistance, NT=Not Tested    GAIT: I Mod I S SBA CGA Min Mod Max Total  NT x2 Comments:   Level of Assistance [] [] [] [x] [] [] [] [] [] [] [] Much improved with ambulation today compared to yesterday   Distance 150 feet    DME Rolling Walker    Gait Quality Decreased you , Decreased step clearance, and Decreased step length    Weightbearing Status      Stairs      I=Independent, Mod I=Modified Independent, S=Supervision, SBA=Standby Assistance, CGA=Contact Guard Assistance,   Min=Minimal Assistance, Mod=Moderate Assistance, Max=Maximal Assistance, Total=Total Assistance, NT=Not Tested    PLAN:   FREQUENCY AND DURATION: Daily for duration of hospital stay or until stated goals are met, whichever comes first.    THERAPY PROGNOSIS: Excellent    PROBLEM LIST:   (Skilled intervention is medically necessary to address:)  Decreased Activity Tolerance  Decreased Balance  Decreased Gait Ability  Decreased Safety Awareness  Decreased Strength  Decreased Transfer Abilities INTERVENTIONS PLANNED:   (Benefits and precautions of physical therapy have been discussed with the patient.)  Therapeutic Activity  Therapeutic Exercise/HEP  Gait Training  Education       TREATMENT:     TREATMENT:   Therapeutic Activity (10 Minutes): Therapeutic activity included Transfer Training, Ambulation on level ground, Sitting balance , and Standing balance to improve functional Activity tolerance, Balance, Coordination, Mobility, and Strength.     TREATMENT GRID:  N/A    AFTER TREATMENT PRECAUTIONS: Bed/Chair Locked, Call light within reach, Chair, and Needs within reach    INTERDISCIPLINARY COLLABORATION:  OT/ PAYNE and RN Case Manager/      EDUCATION: Education Outcome: Continued education needed    TIME IN/OUT:  Time In: 1205  Time Out: [address]  Minutes: Via Rick Gonzalez, PT

## 2022-09-29 NOTE — PROGRESS NOTES
[facility] CARDIOLOGY PROGRESS NOTE           9/29/2022 3:46 PM    Admit Date: 9/28/2022      Subjective:       Review of Systems   Constitutional:  Negative for activity change. HENT:  Negative for congestion. Eyes:  Negative for discharge. Cardiovascular:  Negative for chest pain. Gastrointestinal:  Negative for abdominal distention. Endocrine: Negative for cold intolerance. Genitourinary:  Negative for difficulty urinating. Objective:      Vitals:    09/28/22 2316 09/29/22 0233 09/29/22 0728 09/29/22 1111   BP: 100/69 (!) 99/57 (!) 149/66 (!) 104/48   Pulse: 61 61 57 53   Resp: 16 16 17 17   Temp: 98.6 °F (37 °C) 98.6 °F (37 °C) 97.7 °F (36.5 °C) 97.9 °F (36.6 °C)   TempSrc: Oral Oral Oral Oral   SpO2: 90% 90% 90%    Weight:       Height:             Physical Exam  Vitals reviewed. HENT:      Head: Normocephalic. Right Ear: External ear normal.      Left Ear: External ear normal.      Nose: Nose normal.   Eyes:      General: No scleral icterus. Pulmonary:      Effort: Pulmonary effort is normal.   Abdominal:      General: There is no distension. Musculoskeletal:      Cervical back: Neck supple. Skin:     General: Skin is warm. Neurological:      Mental Status: She is alert. Mental status is at baseline.        Data Review:   Recent Labs     09/27/22  1754 09/28/22  0502 09/29/22  0440   * 136 137   K 4.6 4.8 4.0   MG  --  2.1  --    BUN 16 18 14   WBC 6.9  --  6.0   HGB 15.2  --  13.4   HCT 44.5  --  39.9     --  184       [unfilled]  Current Facility-Administered Medications   Medication Dose Route Frequency    buPROPion [facility]ILDREN - CINCount includes the [facility] SR) extended release tablet 150 mg  150 mg Oral BID    sodium chloride flush 0.9 % injection 5-40 mL  5-40 mL IntraVENous PRN    escitalopram (LEXAPRO) tablet 20 mg  20 mg Oral Daily    sodium chloride flush 0.9 % injection 5-40 mL  5-40 mL IntraVENous 2 times per day    0.9 % sodium chloride infusion   IntraVENous PRN acetaminophen (TYLENOL) tablet 650 mg  650 mg Oral Q6H PRN    Or    acetaminophen (TYLENOL) suppository 650 mg  650 mg Rectal Q6H PRN    ondansetron (ZOFRAN-ODT) disintegrating tablet 4 mg  4 mg Oral Q8H PRN    Or    ondansetron (ZOFRAN) injection 4 mg  4 mg IntraVENous Q6H PRN    polyethylene glycol (GLYCOLAX) packet 17 g  17 g Oral Daily PRN    rivaroxaban (XARELTO) tablet 2.5 mg  2.5 mg Oral BID    aspirin EC tablet 81 mg  81 mg Oral Daily         Left Ventricle: Normal left ventricular systolic function with a visually estimated EF of 60 - 65%. Left ventricle size is normal. Mildly increased wall thickness. Normal wall motion. Abnormal diastolic function. Mitral Valve: Moderate regurgitation. Tricuspid Valve: Moderate regurgitation. The estimated RVSP is 23 mmHg. Left Atrium: Left atrium is severely dilated. Right Atrium: Right atrium is severely dilated. Contrast used: Definity. Assessment/Plan:     81 y.o. female history of coronary artery disease peripheral vascular disease hypertension tobacco abuse with near syncopal episodes.     Near syncope  Potential bradycardia as contributing factor the patient has no class I indications for beta-blocker  We discussed class II indications for device-based therapies based on potential chronotropic incompetence  Patient will be assessed with cardiac monitor following discharge off beta-blocker therapy    Peripheral arterial disease  On Xarelto    Hyperlipidemia  Lipitor    Hypertension  Alternative strategies may be initiated as an outpatient  Florinda Solano MD  9/29/2022 3:46 PM

## 2022-09-29 NOTE — TELEPHONE ENCOUNTER
----- Message from Claudy Carpenter MD sent at 9/29/2022  4:55 PM EDT -----  48 hr holter and 3 week follow up   ----- Message -----  From: Lexie Tavares MA  Sent: 9/29/2022   4:25 PM EDT  To: Clauyd Carpenter MD    How many days and dx    ----- Message -----  From: Claudy Carpenter MD  Sent: 9/29/2022   3:45 PM EDT  To: Lexie Tavares MA    Please schedule follow up wit me and holter prior to appt former cebe patient

## 2022-09-29 NOTE — DISCHARGE SUMMARY
Hospitalist Discharge Summary   Admit Date:  2022 12:55 AM   DC Note date: 2022  Name:  Mercedes Louis   Age:  81 y.o. Sex:  female  :  1950   MRN:  [id number]   Room:  [facility]  PCP:  Scott Felty, APRN - NP    Presenting Complaint: No chief complaint on file. Initial Admission Diagnosis: Recurrent falls [R29.6]     Problem List for this Hospitalization (present on admission):    Principal Problem:    Recurrent falls  Active Problems:    Sinus bradycardia by electrocardiogram    History of fall    Near syncope    PAD (peripheral artery disease) ([facility])  Resolved Problems:    * No resolved hospital problems. *    HPI:  Mercedes Louis is a 81 y.o. female with medical history of obesity, former smoker, PAD s/p BL iliac stents, CAD s/p 4v- CABG, CHF who presented with c/o falls, dizziniess/lightheadedness, and worsening tremors. She fell on on Saturday and another fall today with head injury. Reports getting dizzy/lightheaded, grabbed the door and then fell. Takes Xarelto. In ED, BP wide pulse pressure. HR 49. CXR pulmonary vascular congestion, trace interstitial edema and trace BL pleural effusions. CT head chronic microangiopathy. C spine no acute. Lumbar XR with degenerative changes, no acute fracture. XR pelvis - no acute findings. CBC unremarkable. CMP with Sna 134. Rec'd dilaudid and NS IVF bolus. Daughter at beside. She recently moved in with her daughter who has noticed a shuffling gait and worsening tremors. Tremors wake patient up at night, occur both at rest and with movement. She has seen Dr Sridevi Ritter in the past who recommended drinking a little wine at bedtime for tremors and that if atenolol was stopped, she'd likely have worsening tremors. Pt states she hasn't felt well in awhile. Eating and drinking normally. No dysuria or difficulty urinating. Admits to finger tip numbness/tingling ongoing for years. Constipaton intermittently.     Hospital Course:  Mercedes Louis is a 81 y.o. female with medical history of obesity, former smoker, PAD s/p BL iliac stents, CAD s/p 4v- CABG, CHF admitted with presyncopal episode and worsening tremors. CT head and CT cervical spine negative for acute pathology. Carotid Dopplers and echocardiogram within normal limit. TSH normal.  Patient also noted to have sinus bradycardia in the setting of beta-blocker use. Cardiology consulted and recommend presyncopal episodes upon standing most consistent with orthostatic hypotension. Orthostatic hypotension precautions discussed. Cardiology recommend to stop atenolol as no obvious indication for beta-blocker at this time. Heart rate stable in 50s to 60s. Also noted to have tremors, most likely essential tremors. Patient is following neurology outpatient. All other chronic conditions stable and we continued essential home meds. Patient symptoms have been improved significantly. PT OT recommending home health versus short-term rehab. Patient and family opted for home health. Patient is stable to discharge home today with close follow-up with PCP, cardiology and neurology. Disposition: Home Health  Diet: ADULT DIET; Regular; Low Fat/Low Chol/High Fiber/KHARI  Code Status: Full Code    Follow Ups:   Follow-up Information     LYN Knight NP Follow up on 10/5/2022. Specialty: Nurse Practitioner  Why: Gamal Montemayor time 11:00 am  Contact information:  [address]             [address] Follow up in 1 week(s). Specialty: Neurology  Why: 9/29/22 - Patient need to call office and schedule a 1 week hospital   follow-up  Contact information:  [address] Follow up in 2 week(s).     Specialty: Cardiology  Why: 9/29/22- Patient need to call office an achedule a 2 week hospital   follow-up  Contact information:  [address] [address] [phone] Time spent in patient discharge and coordination 38 minutes. Plan was discussed with patient and daughter. All questions answered. Patient was stable at time of discharge. Instructions given to call a physician or return if any concerns. Current Discharge Medication List        START taking these medications    Details   aspirin 81 MG EC tablet Take 1 tablet by mouth daily  Qty: 30 tablet, Refills: 0      buPROPion (WELLBUTRIN SR) 150 MG extended release tablet Take 1 tablet by mouth in the morning and at bedtime  Qty: 60 tablet, Refills: 0           CONTINUE these medications which have CHANGED    Details   furosemide (LASIX) 20 MG tablet Take 1 tablet by mouth daily  Qty: 30 tablet, Refills: 0           CONTINUE these medications which have NOT CHANGED    Details   Biotin 10 MG CAPS Take by mouth      escitalopram (LEXAPRO) 10 MG tablet Take 20 mg by mouth daily      potassium gluconate 550 mg tablet Take 595 mg by mouth      rivaroxaban (XARELTO) 2.5 MG TABS tablet Take 2.5 mg by mouth 2 times daily (with meals)           STOP taking these medications       atenolol (TENORMIN) 25 MG tablet Comments:   Reason for Stopping:         atorvastatin (LIPITOR) 80 MG tablet Comments:   Reason for Stopping:         nitroGLYCERIN (NITROSTAT) 0.4 MG SL tablet Comments:   Reason for Stopping:               Procedures done this admission:  * No surgery found *    Consults this admission:  IP CONSULT TO CARDIOLOGY  YESENIA FAJARDO/ [facility]    Echocardiogram results:  09/28/22    TRANSTHORACIC ECHOCARDIOGRAM (TTE) COMPLETE (CONTRAST/BUBBLE/3D PRN) 09/28/2022  1:21 PM (Final)    Interpretation Summary    Left Ventricle: Normal left ventricular systolic function with a visually estimated EF of 60 - 65%. Left ventricle size is normal. Mildly increased wall thickness. Normal wall motion. Abnormal diastolic function. Mitral Valve: Moderate regurgitation. Tricuspid Valve: Moderate regurgitation.  The estimated RVSP is 23 mmHg. Left Atrium: Left atrium is severely dilated. Right Atrium: Right atrium is severely dilated. Contrast used: Definity. Signed by: Silvia Vega MD on 9/28/2022  1:21 PM      Diagnostic Imaging/Tests:   XR LUMBAR SPINE (2-3 VIEWS)    Result Date: 9/27/2022  No fracture. XR PELVIS (1-2 VIEWS)    Result Date: 9/27/2022  No fracture. CT HEAD WO CONTRAST    Result Date: 9/27/2022  No CT evidence of acute intracranial abnormality. CT CERVICAL SPINE WO CONTRAST    Result Date: 9/27/2022  No acute osseous abnormality. XR CHEST PORTABLE    Result Date: 9/27/2022  1. No consolidation. 2.  Pulmonary vascular congestion, prior heart surgery. Vascular duplex carotid bilateral    Result Date: 9/28/2022  1. Less than 50% stenosis of the right internal carotid artery. 2. Less than 50% stenosis of the left internal carotid artery.  3. Bilateral external carotid artery stenoses        Labs: Results:       BMP, Mg, Phos Recent Labs     09/27/22  1754 09/28/22  0502 09/29/22  0440   * 136 137   K 4.6 4.8 4.0    105 102   CO2 26 29 28   ANIONGAP 6 2* 7   BUN 16 18 14   CREATININE 1.00 1.21* 0.92   LABGLOM 58* 47* >60   GFRAA >60 57* >60   CALCIUM 10.2 9.0 8.7   GLUCOSE 93 79 86   MG  --  2.1  --       CBC Recent Labs     09/27/22  1754 09/29/22  0440   WBC 6.9 6.0   RBC 4.66 4.25   HGB 15.2 13.4   HCT 44.5 39.9   MCV 95.5 93.9   MCH 32.6 31.5   MCHC 34.2 33.6   RDW 12.2 11.9    184   MPV 9.0* 8.7*   NRBC 0.00 0.00   SEGS 61 57   LYMPHOPCT 24 24   EOSRELPCT 2 4   MONOPCT 12 14*   BASOPCT 1 1   IMMGRAN 0 1   SEGSABS 4.2 3.4   LYMPHSABS 1.7 1.5   EOSABS 0.2 0.2   MONOSABS 0.8 0.9   BASOSABS 0.0 0.1   ABSIMMGRAN 0.0 0.0      LFT Recent Labs     09/27/22  1754   BILITOT 0.5   ALKPHOS 77   AST 16   ALT 25   PROT 7.3   LABALBU 3.7   GLOB 3.6*      Cardiac  Lab Results   Component Value Date/Time    NTPROBNP 505 09/28/2022 05:02 AM    TROPHS 8.4 09/28/2022 05:02 AM Coags No results found for: PROTIME, INR, APTT   A1c No results found for: LABA1C, EAG   Lipids No results found for: CHOL, LDLCALC, LABVLDL, HDL, CHOLHDLRATIO, TRIG   Thyroid  Lab Results   Component Value Date/Time    TSHELE 2.80 09/28/2022 05:02 AM        Most Recent UA No results found for: [place], SPECGRAV, LABPH, PROTEINU, GLUCOSEU, KETUA, BILIRUBINUR, BLOODU, UROBILINOGEN, NITRU, LEUKOCYTESUR, WBCUA, RBCUA, EPITHUA, BACTERIA, LABCAST, MUCUS     No results for input(s): CULTURE in the last 720 hours.     All Labs from Last 24 Hrs:  Recent Results (from the past 24 hour(s))   Basic Metabolic Panel w/ Reflex to MG    Collection Time: 09/29/22  4:40 AM   Result Value Ref Range    Sodium 137 136 - 145 mmol/L    Potassium 4.0 3.5 - 5.1 mmol/L    Chloride 102 101 - 110 mmol/L    CO2 28 21 - 32 mmol/L    Anion Gap 7 4 - 13 mmol/L    Glucose 86 65 - 100 mg/dL    BUN 14 8 - 23 MG/DL    Creatinine 0.92 0.6 - 1.0 MG/DL    GFR African American >60 >60 ml/min/1.73m2    GFR Non- >60 >60 ml/min/1.73m2    Calcium 8.7 8.3 - 10.4 MG/DL   CBC with Auto Differential    Collection Time: 09/29/22  4:40 AM   Result Value Ref Range    WBC 6.0 4.3 - 11.1 K/uL    RBC 4.25 4.05 - 5.2 M/uL    Hemoglobin 13.4 11.7 - 15.4 g/dL    Hematocrit 39.9 35.8 - 46.3 %    MCV 93.9 79.6 - 97.8 FL    MCH 31.5 26.1 - 32.9 PG    MCHC 33.6 31.4 - 35.0 g/dL    RDW 11.9 11.9 - 14.6 %    Platelets 099 637 - 961 K/uL    MPV 8.7 (L) 9.4 - 12.3 FL    nRBC 0.00 0.0 - 0.2 K/uL    Differential Type AUTOMATED      Seg Neutrophils 57 43 - 78 %    Lymphocytes 24 13 - 44 %    Monocytes 14 (H) 4.0 - 12.0 %    Eosinophils % 4 0.5 - 7.8 %    Basophils 1 0.0 - 2.0 %    Immature Granulocytes 1 0.0 - 5.0 %    Segs Absolute 3.4 1.7 - 8.2 K/UL    Absolute Lymph # 1.5 0.5 - 4.6 K/UL    Absolute Mono # 0.9 0.1 - 1.3 K/UL    Absolute Eos # 0.2 0.0 - 0.8 K/UL    Basophils Absolute 0.1 0.0 - 0.2 K/UL    Absolute Immature Granulocyte 0.0 0.0 - 0.5 K/UL Allergies   Allergen Reactions    Penicillins Hives     Has taken penicillin without any reactions - states she is no longer allergic     Adhesive Tape Rash     Paper tape ok    Codeine Nausea And Vomiting     Immunization History   Administered Date(s) Administered    COVID-19, PFIZER PURPLE top, DILUTE for use, (age 15 y+), 30mcg/0.3mL 03/18/2021, 04/12/2021    Influenza, FLUARIX, FLULAVAL, FLUZONE (age 10 mo+) AND AFLURIA, (age 1 y+), PF, 0.5mL 11/13/2018    Pneumococcal Vaccine 06/30/2006       Recent Vital Data:  Patient Vitals for the past 24 hrs:   Temp Pulse Resp BP SpO2   09/29/22 1111 97.9 °F (36.6 °C) 53 17 (!) 104/48 --   09/29/22 0728 97.7 °F (36.5 °C) 57 17 (!) 149/66 90 %   09/29/22 0233 98.6 °F (37 °C) 61 16 (!) 99/57 90 %   09/28/22 2316 98.6 °F (37 °C) 61 16 100/69 90 %   09/28/22 2033 98.8 °F (37.1 °C) 63 16 100/62 94 %   09/28/22 1555 98.1 °F (36.7 °C) 59 17 104/79 --       Oxygen Therapy  SpO2: 90 %  O2 Device: None (Room air)    Estimated body mass index is 32.42 kg/m² as calculated from the following:    Height as of this encounter: 5' (1.524 m). Weight as of this encounter: 166 lb (75.3 kg). Intake/Output Summary (Last 24 hours) at 9/29/2022 1302  Last data filed at 9/29/2022 0936  Gross per 24 hour   Intake 300 ml   Output --   Net 300 ml         Physical Exam:    General:    No overt distress  Head:  Normocephalic, atraumatic  Eyes:  Sclerae appear normal.  Pupils equally round. HENT:  Nares appear normal, no drainage. Moist mucous membranes  Neck:  No restricted ROM. Trachea midline  CV:   RRR. No m/r/g. No JVD  Lungs:   CTAB. No wheezing, rhonchi, or rales. Respirations even, unlabored  Abdomen:   Soft, nontender, nondistended. Extremities: Warm and dry. No cyanosis or clubbing. No edema. Skin:     No rashes. Normal coloration  Neuro:  CN II-XII grossly intact. Psych:  Normal mood and affect.     Signed:  Marquis Rolando MD    Part of this note may have been written by using a voice dictation software. The note has been proof read but may still contain some grammatical/other typographical errors.

## 2022-09-29 NOTE — PROGRESS NOTES
AVS reviewed with patient. Verbalized understanding. Peripheral IV removed with catheter tip intact. Transported via wheelchair to granddaughter's car.

## 2022-09-29 NOTE — PROGRESS NOTES
ACUTE OCCUPATIONAL THERAPY GOALS:   (Developed with and agreed upon by patient and/or caregiver.)  1. Patient will perform grooming with supervision. 2. Patient will perform upper body dressing with supervision. 3. Patient will perform lower body dressing with supervision. 4. Patient will perform bathing with supervision. 5. Patient will perform toileting and toilet transfer with supervision. 6. Patient will perform ADL functional mobility and tranfers in room with supervision. 7. Patient/family to demonstrate knowledge of home safety and DME recommendations. Goals to be achieved in 7 days. OCCUPATIONAL THERAPY Daily Note and AM       OT Visit Days: 2  Acknowledge Orders  Time  OT Charge Capture  Rehab Caseload Tracker      Nico Guy is a 81 y.o. female   PRIMARY DIAGNOSIS: Recurrent falls  Recurrent falls [R29.6]       Reason for Referral: Generalized Muscle Weakness (M62.81)  Repeated Falls (R29.6)  Observation: Payor: Sherryle Brunswick / Plan: Bhupendra Anand / Product Type: *No Product type* /     ASSESSMENT:     REHAB RECOMMENDATIONS:   Recommendation to date pending progress:  Setting:  Home Health Therapy     Equipment:    Rolling Walker     ASSESSMENT:  Ms. Esvin Webster was admitted with above diagnosis, pt reports having episodes of dizziness that cause her to fall. Pt is noted to have mild generalized weakness and poor standing balance. Pt would benefit from OT to maximize safety and independence with self care and functional mobility. Pt might also benefit from home health therapy at discharge. Pt up in room with hand held assistance, pt tends to lose her balance backward. Pt to recliner and set up with all needs. OT will follow. 9/29/22: Pt sitting up in recliner, dressed and agreeable to therapy session. She performed functional household mobility around room and hallway using RW with cues for safety. She plans to have support from daughter and granddaughter once home. Continue per OT POC.       [address] [facility] [address] [address] [facility] [facility] Clicks Daily Activity Inpatient Short Form:    AM-PAC Daily Activity Inpatient   How much help for putting on and taking off regular lower body clothing?: A Little  How much help for Bathing?: A Little  How much help for Toileting?: A Little  How much help for putting on and taking off regular upper body clothing?: A Little  How much help for taking care of personal grooming?: A Little  How much help for eating meals?: None  [facility] Inpatient Daily Activity Raw Score: 19  AM-PAC Inpatient ADL T-Scale Score : 40.22  ADL Inpatient CMS 0-100% Score: 42.8  ADL Inpatient CMS G-Code Modifier : CK           SUBJECTIVE:     Ms. Maddie Godoy states, she has had falls     Social/Functional Lives With: Spouse, Family  Type of Home: House  ADL Assistance: Independent  Homemaking Assistance: Independent  Ambulation Assistance: Independent  Transfer Assistance: Independent  Active : Yes  Mode of Transportation: Car  Occupation: Retired    OBJECTIVE:     [place]win Antonio / [place] / [place]: None    RESTRICTIONS/PRECAUTIONS:  Restrictions/Precautions: Fall Risk    PAIN: VITALS / O2:   Pre Treatment:          Post Treatment: reports left hip pain has improved       Vitals          Oxygen            GROSS EVALUATION: INTACT IMPAIRED   (See Comments)   UE AROM [x] []   UE PROM [x] []   Strength []  Generalized weakness     Posture / Balance []  Balance improved with use of RW   Sensation [x]     Coordination [x]       Tone [x]       Edema []    Activity Tolerance [x]       Hand Dominance R [] L []      COGNITION/  PERCEPTION: INTACT IMPAIRED   (See Comments)   Orientation [x]     Vision [x]     Hearing [x]     Cognition  [x]     Perception [x]       MOBILITY: I Mod I S SBA CGA Min Mod Max Total  NT x2 Comments:   Bed Mobility    Rolling [] [] [] [] [] [] [] [] [] [] []    Supine to Sit [] [] [] [] [] [] [] [] [] [] []    Scooting [] [] [] [] [] [] [] [] [] [] []    Sit to Supine [] [] [] [] [] [] [] [] [] [] []    Transfers    Sit to Stand [] [] [] [] [x] [] [] [] [] [] []    Bed to Chair [] [] [] [x] [x] [] [] [] [] [] []    Stand to Sit [] [] [] [] [x] [] [] [] [] [] []    Tub/Shower [] [] [] [] [x] [] [] [] [] [] []     Toilet [] [] [] [] [x] [] [] [] [] [] []      [] [] [] [] [] [] [] [] [] [] []    I=Independent, Mod I=Modified Independent, S=Supervision/Setup, SBA=Standby Assistance, CGA=Contact Guard Assistance, Min=Minimal Assistance, Mod=Moderate Assistance, Max=Maximal Assistance, Total=Total Assistance, NT=Not Tested    ACTIVITIES OF DAILY LIVING: I Mod I S SBA CGA Min Mod Max Total NT Comments   BASIC ADLs:              Upper Body Bathing  [] [] [] [] [] [] [] [] [] []    Lower Body Bathing [] [] [] [] [] [] [] [] [] []    Toileting [] [] [x] [] [] [] [] [] [] []    Upper Body Dressing [] [] [x] [] [] [] [] [] [] []    Lower Body Dressing [] [] [x] [] [] [] [] [] [] []    Feeding [x] [] [] [] [] [] [] [] [] []    Grooming [] [] [x] [] [] [] [] [] [] []    Personal Device Care [] [] [] [] [] [] [] [] [] []    Functional Mobility [] [] [] [] [x] [] [] [] [] [] RW   I=Independent, Mod I=Modified Independent, S=Supervision/Setup, SBA=Standby Assistance, CGA=Contact Guard Assistance, Min=Minimal Assistance, Mod=Moderate Assistance, Max=Maximal Assistance, Total=Total Assistance, NT=Not Tested    PLAN:   FREQUENCY/DURATION     for duration of hospital stay or until stated goals are met, whichever comes first.    PROBLEM LIST:   (Skilled intervention is medically necessary to address:)  Decreased ADL/Functional Activities  Decreased Balance  Decreased Strength  Increased Pain   INTERVENTIONS PLANNED:  (Benefits and precautions of occupational therapy have been discussed with the patient.)  Self Care Training  Therapeutic Activity  Therapeutic Exercise/HEP  Neuromuscular Re-education  Manual Therapy  Education         TREATMENT:     TREATMENT:   Self Care: (15 min): Procedure(s) (per grid) utilized to improve and/or restore self-care/home management as related to  functional mobility in prep for OOB ADLs and household tasks . Required minimal verbal, manual, and tactile cueing to facilitate activities of daily living skills and compensatory activities. TREATMENT GRID:  N/A    AFTER TREATMENT PRECAUTIONS: Bed/Chair Locked, Call light within reach, Chair, Needs within reach, and RN notified    INTERDISCIPLINARY COLLABORATION:  RN/ PCT, PT/ PTA, and OT/ PAYNE    EDUCATION:  Education Given To: Patient  Education Provided: Role of Therapy;Plan of Care;ADL Adaptive Strategies;Transfer Training;Energy Conservation;Equipment; Fall Prevention Strategies  Education Method: Demonstration;Verbal  Barriers to Learning: None  Education Outcome: Verbalized understanding;Demonstrated understanding;Continued education needed    TOTAL TREATMENT DURATION AND TIME:  Time In: 1155  Time Out: 1210  Minutes: [address] [place], [place]

## 2022-10-01 ENCOUNTER — HOME CARE VISIT (OUTPATIENT)
Dept: SCHEDULING | Facility: HOME HEALTH | Age: 72
End: 2022-10-01
Payer: MEDICARE

## 2022-10-01 VITALS
DIASTOLIC BLOOD PRESSURE: 64 MMHG | RESPIRATION RATE: 17 BRPM | HEART RATE: 60 BPM | SYSTOLIC BLOOD PRESSURE: 118 MMHG | TEMPERATURE: 97 F | OXYGEN SATURATION: 94 %

## 2022-10-01 PROCEDURE — 400013 HH SOC

## 2022-10-01 PROCEDURE — G0151 HHCP-SERV OF PT,EA 15 MIN: HCPCS

## 2022-10-01 ASSESSMENT — ENCOUNTER SYMPTOMS
BOWEL INCONTINENCE: 1
DYSPNEA ACTIVITY LEVEL: AFTER AMBULATING 10 - 20 FT
PAIN LOCATION - PAIN QUALITY: ACHE

## 2022-10-05 ENCOUNTER — HOME CARE VISIT (OUTPATIENT)
Dept: SCHEDULING | Facility: HOME HEALTH | Age: 72
End: 2022-10-05
Payer: MEDICARE

## 2022-10-05 VITALS
HEART RATE: 76 BPM | DIASTOLIC BLOOD PRESSURE: 62 MMHG | OXYGEN SATURATION: 97 % | RESPIRATION RATE: 15 BRPM | TEMPERATURE: 97.4 F | SYSTOLIC BLOOD PRESSURE: 106 MMHG

## 2022-10-05 PROCEDURE — G0157 HHC PT ASSISTANT EA 15: HCPCS

## 2022-10-06 ENCOUNTER — HOME CARE VISIT (OUTPATIENT)
Dept: SCHEDULING | Facility: HOME HEALTH | Age: 72
End: 2022-10-06
Payer: MEDICARE

## 2022-10-06 VITALS
OXYGEN SATURATION: 98 % | RESPIRATION RATE: 15 BRPM | SYSTOLIC BLOOD PRESSURE: 112 MMHG | TEMPERATURE: 97.6 F | HEART RATE: 76 BPM | DIASTOLIC BLOOD PRESSURE: 70 MMHG

## 2022-10-06 PROCEDURE — G0157 HHC PT ASSISTANT EA 15: HCPCS

## 2022-10-07 ENCOUNTER — HOME CARE VISIT (OUTPATIENT)
Dept: SCHEDULING | Facility: HOME HEALTH | Age: 72
End: 2022-10-07
Payer: MEDICARE

## 2022-10-07 PROCEDURE — G0152 HHCP-SERV OF OT,EA 15 MIN: HCPCS

## 2022-10-09 VITALS
RESPIRATION RATE: 18 BRPM | SYSTOLIC BLOOD PRESSURE: 118 MMHG | OXYGEN SATURATION: 91 % | HEART RATE: 76 BPM | TEMPERATURE: 98.1 F | DIASTOLIC BLOOD PRESSURE: 70 MMHG

## 2022-10-11 ENCOUNTER — HOME CARE VISIT (OUTPATIENT)
Dept: HOME HEALTH SERVICES | Facility: HOME HEALTH | Age: 72
End: 2022-10-11
Payer: MEDICARE

## 2022-10-11 ENCOUNTER — HOME CARE VISIT (OUTPATIENT)
Dept: SCHEDULING | Facility: HOME HEALTH | Age: 72
End: 2022-10-11
Payer: MEDICARE

## 2022-10-11 PROCEDURE — G0157 HHC PT ASSISTANT EA 15: HCPCS

## 2022-10-12 VITALS
SYSTOLIC BLOOD PRESSURE: 130 MMHG | TEMPERATURE: 97 F | HEART RATE: 73 BPM | DIASTOLIC BLOOD PRESSURE: 70 MMHG | OXYGEN SATURATION: 98 % | RESPIRATION RATE: 16 BRPM

## 2022-10-13 ENCOUNTER — HOME CARE VISIT (OUTPATIENT)
Dept: SCHEDULING | Facility: HOME HEALTH | Age: 72
End: 2022-10-13
Payer: MEDICARE

## 2022-10-13 VITALS
RESPIRATION RATE: 18 BRPM | SYSTOLIC BLOOD PRESSURE: 132 MMHG | OXYGEN SATURATION: 97 % | DIASTOLIC BLOOD PRESSURE: 78 MMHG | HEART RATE: 81 BPM | TEMPERATURE: 97.6 F

## 2022-10-13 PROCEDURE — G0151 HHCP-SERV OF PT,EA 15 MIN: HCPCS

## 2022-10-13 ASSESSMENT — ENCOUNTER SYMPTOMS: DYSPNEA ACTIVITY LEVEL: AFTER AMBULATING MORE THAN 20 FT

## 2022-10-14 ENCOUNTER — OFFICE VISIT (OUTPATIENT)
Dept: CARDIOLOGY CLINIC | Age: 72
End: 2022-10-14
Payer: MEDICARE

## 2022-10-14 VITALS
WEIGHT: 168.4 LBS | HEIGHT: 60 IN | SYSTOLIC BLOOD PRESSURE: 132 MMHG | HEART RATE: 76 BPM | BODY MASS INDEX: 33.06 KG/M2 | DIASTOLIC BLOOD PRESSURE: 78 MMHG

## 2022-10-14 DIAGNOSIS — R00.1 BRADYCARDIA: Primary | ICD-10-CM

## 2022-10-14 DIAGNOSIS — I73.9 PERIPHERAL VASCULAR DISEASE, UNSPECIFIED (HCC): ICD-10-CM

## 2022-10-14 DIAGNOSIS — I10 ESSENTIAL (PRIMARY) HYPERTENSION: ICD-10-CM

## 2022-10-14 PROCEDURE — 1036F TOBACCO NON-USER: CPT | Performed by: INTERNAL MEDICINE

## 2022-10-14 PROCEDURE — G8400 PT W/DXA NO RESULTS DOC: HCPCS | Performed by: INTERNAL MEDICINE

## 2022-10-14 PROCEDURE — 3017F COLORECTAL CA SCREEN DOC REV: CPT | Performed by: INTERNAL MEDICINE

## 2022-10-14 PROCEDURE — 99214 OFFICE O/P EST MOD 30 MIN: CPT | Performed by: INTERNAL MEDICINE

## 2022-10-14 PROCEDURE — G8428 CUR MEDS NOT DOCUMENT: HCPCS | Performed by: INTERNAL MEDICINE

## 2022-10-14 PROCEDURE — G8484 FLU IMMUNIZE NO ADMIN: HCPCS | Performed by: INTERNAL MEDICINE

## 2022-10-14 PROCEDURE — G8417 CALC BMI ABV UP PARAM F/U: HCPCS | Performed by: INTERNAL MEDICINE

## 2022-10-14 PROCEDURE — 1111F DSCHRG MED/CURRENT MED MERGE: CPT | Performed by: INTERNAL MEDICINE

## 2022-10-14 PROCEDURE — 1090F PRES/ABSN URINE INCON ASSESS: CPT | Performed by: INTERNAL MEDICINE

## 2022-10-14 PROCEDURE — 1123F ACP DISCUSS/DSCN MKR DOCD: CPT | Performed by: INTERNAL MEDICINE

## 2022-10-14 RX ORDER — ESCITALOPRAM OXALATE 20 MG/1
TABLET ORAL
COMMUNITY
Start: 2022-08-26

## 2022-10-14 RX ORDER — FUROSEMIDE 20 MG/1
20 TABLET ORAL AS NEEDED
Qty: 30 TABLET | Refills: 2 | Status: SHIPPED | OUTPATIENT
Start: 2022-10-14 | End: 2022-11-13

## 2022-10-14 RX ORDER — BUPROPION HYDROCHLORIDE 300 MG/1
TABLET ORAL
COMMUNITY
Start: 2022-08-31

## 2022-10-14 ASSESSMENT — ENCOUNTER SYMPTOMS
NAIL CHANGES: 0
COUGH: 0
STRIDOR: 0
APHONIA: 0
EYE PAIN: 0
ABDOMINAL PAIN: 0

## 2022-10-14 NOTE — PROGRESS NOTES
[address]  [address] [place], [place] [address], [address]  PHONE: [phone]    SUBJECTIVE:   Adam Garcia is a 81 y.o. female 1950   seen for a follow up visit regarding the following:     Chief Complaint   Patient presents with    New Patient     Monitor results           History of present illness: 81 y.o. female presented for follow-up 10/14/22     Cardiac history:    Left Ventricle: Normal left ventricular systolic function with a visually estimated EF of 60 - 65%. Left ventricle size is normal. Mildly increased wall thickness. Normal wall motion. Abnormal diastolic function. Mitral Valve: Moderate regurgitation. Tricuspid Valve: Moderate regurgitation. The estimated RVSP is 23 mmHg. Left Atrium: Left atrium is severely dilated. Right Atrium: Right atrium is severely dilated. Contrast used: Definity. 10/2022 The minimum heart rate was 52 BPM, occurring at 7:19:44 AM. The maximum rate was 106  BPM, occurring at 7:03:03 PM(2). The average heart rate was 66 BPM.       Assessment:   81 y.o. female  history of coronary artery disease peripheral vascular disease hypertension tobacco abuse with near syncopal episodes.      Near syncope  Potential bradycardia as contributing factor the patient has no class I indications for beta-blocker  We discussed class II indications for device-based therapies based on potential chronotropic incompetence  Patient will be assessed with cardiac monitor following discharge off beta-blocker therapy     Peripheral arterial disease  On Xarelto     Hyperlipidemia  Lipitor     Hypertension  Alternative strategies may be initiated as an outpatient  Prn lasix     Current Outpatient Medications   Medication Sig    buPROPion (WELLBUTRIN XL) 300 MG extended release tablet TAKE 1 TABLET BY MOUTH EVERY DAY IN THE MORNING FOR 90 DAYS    escitalopram (LEXAPRO) 20 MG tablet TAKE 1 TABLET BY MOUTH EVERY DAY    furosemide (LASIX) 20 MG tablet Take 1 tablet by mouth as needed (Weight gain greater than 5 pounds in 48 hours)    acetaminophen (TYLENOL) 500 MG tablet Take 1,000 mg by mouth every 6 hours as needed for Pain. Vitamin D, Cholecalciferol, 25 MCG (1000 UT) TABS Take 1 tablet by mouth daily. Magnesium 100 MG TABS Take 1 tablet by mouth daily. Omega-3 Fatty Acids (FISH OIL) 1200 MG CAPS Take 1 capsule by mouth daily. aspirin 81 MG EC tablet Take 1 tablet by mouth daily    Biotin 10 MG CAPS Take 1 capsule by mouth daily    potassium gluconate 550 mg tablet Take 595 mg by mouth daily    rivaroxaban (XARELTO) 2.5 MG TABS tablet Take 2.5 mg by mouth 2 times daily (with meals)     No current facility-administered medications for this visit. Past Medical History, Past Surgical History, Family history, Social History, and Medications were all reviewed with the patient today and updated as necessary. Allergies   Allergen Reactions    Penicillins Hives     Has taken penicillin without any reactions - states she is no longer allergic     Adhesive Tape Rash     Paper tape ok    Codeine Nausea And Vomiting     Past Medical History:   Diagnosis Date    Adverse effect of anesthesia     hard to wake after    Angina pectoris ([facility] [address] 10/13/2015    Arthritis     hands & hips    CAD (coronary artery disease)     5/06 MI and 4 vessel CABG, patient states she has angina pain-last time takingNTG 2 week ago    Chest pain, unspecified 5/27/2011    Coronary atherosclerosis of native coronary artery 10/13/2015    Dyslipidemia 5/27/2011    Dyspnea 10/13/2015    Essential tremor 9/27/2022    Extremity atherosclerosis with intermittent claudication ([facility] [address] 10/13/2015    GERD (gastroesophageal reflux disease)     tums    Heart failure (HCC)     Hx of CABG 5/27/2011    LIMA and 3 SVG.  2 OMs/PLV and one acute marginal of RCA     Hypertension     LVH (left ventricular hypertrophy) 10/13/2015    MI, old     Peripheral arterial disease (HCC) 6/29/2012    Pleural effusion 10/13/2015    Psychiatric disorder     S/P coronary artery stent placement 2011    2.0 x 15mm Xience to OM2-11     S/P peripheral artery angioplasty 2012    Severe obesity (BMI 35.0-39. 9) 2018    Sleep apnea     does not wear cpap- weight loss    Tobacco use disorder 10/13/2015    Unspecified adverse effect of anesthesia     slow to wake up     Past Surgical History:   Procedure Laterality Date    APPENDECTOMY      BREAST SURGERY      biopsy left    CHOLECYSTECTOMY      09    GASTRIC BYPASS SURGERY      gastric bypass    HYSTERECTOMY (CERVIX STATUS UNKNOWN)       partial hyst.,bilat oopherectomy    OVARY REMOVAL      CT CARDIAC SURG PROCEDURE UNLIST      4 vessel CABG - stent x3    UROLOGICAL SURGERY      bladder tack    VASCULAR SURGERY Bilateral 2018    iliac angio and stent     Family History   Problem Relation Age of Onset    Heart Attack Father     Heart Disease Mother     Colon Cancer Neg Hx     Heart Disease Brother       Social History     Tobacco Use    Smoking status: Former     Types: Cigarettes     Quit date: 2006     Years since quittin.4    Smokeless tobacco: Never    Tobacco comments:     Quit smokin/06   Substance Use Topics    Alcohol use: Yes     Alcohol/week: 0.8 standard drinks       ROS:    Review of Systems   Constitutional: Negative for fever. HENT:  Negative for stridor. Eyes:  Negative for pain. Cardiovascular:  Negative for chest pain. Respiratory:  Negative for cough. Endocrine: Negative for cold intolerance. Skin:  Negative for nail changes. Musculoskeletal:  Negative for arthritis. Gastrointestinal:  Negative for abdominal pain. Genitourinary:  Negative for dysuria. Neurological:  Negative for aphonia. Psychiatric/Behavioral:  Negative for altered mental status. Allergic/Immunologic: Negative for hives.          PHYSICAL EXAM:    /78   Pulse 76   Ht 5' (1.524 m)   Wt 168 lb 6.4 oz (76.4 kg)   BMI 32.89 kg/m²        Wt Readings from Last 3 Encounters:   10/14/22 168 lb 6.4 oz (76.4 kg)   09/28/22 166 lb (75.3 kg)   09/27/22 166 lb (75.3 kg)     BP Readings from Last 3 Encounters:   10/14/22 132/78   10/13/22 132/78   10/11/22 130/70         Physical Exam  Vitals reviewed. HENT:      Head: Normocephalic. Right Ear: External ear normal.      Left Ear: External ear normal.      Nose: Nose normal.   Eyes:      General: No scleral icterus. Pulmonary:      Effort: Pulmonary effort is normal.   Abdominal:      General: There is no distension. Musculoskeletal:      Cervical back: Neck supple. Skin:     General: Skin is warm. Neurological:      Mental Status: She is alert. Mental status is at baseline. Medical problems and test results were reviewed with the patient today.            Recent Results (from the past 672 hour(s))   EKG 12 Lead    Collection Time: 09/27/22  5:45 PM   Result Value Ref Range    Ventricular Rate 51 BPM    Atrial Rate 51 BPM    P-R Interval 138 ms    QRS Duration 82 ms    Q-T Interval 456 ms    QTc Calculation (Bazett) 420 ms    P Axis 31 degrees    R Axis 52 degrees    T Axis 14 degrees    Diagnosis Sinus bradycardia    CBC with Auto Differential    Collection Time: 09/27/22  5:54 PM   Result Value Ref Range    WBC 6.9 4.3 - 11.1 K/uL    RBC 4.66 4.05 - 5.2 M/uL    Hemoglobin 15.2 11.7 - 15.4 g/dL    Hematocrit 44.5 35.8 - 46.3 %    MCV 95.5 79.6 - 97.8 FL    MCH 32.6 26.1 - 32.9 PG    MCHC 34.2 31.4 - 35.0 g/dL    RDW 12.2 11.9 - 14.6 %    Platelets 377 566 - 667 K/uL    MPV 9.0 (L) 9.4 - 12.3 FL    nRBC 0.00 0.0 - 0.2 K/uL    Differential Type AUTOMATED      Seg Neutrophils 61 43 - 78 %    Lymphocytes 24 13 - 44 %    Monocytes 12 4.0 - 12.0 %    Eosinophils % 2 0.5 - 7.8 %    Basophils 1 0.0 - 2.0 %    Immature Granulocytes 0 0.0 - 5.0 %    Segs Absolute 4.2 1.7 - 8.2 K/UL    Absolute Lymph # 1.7 0.5 - 4.6 K/UL    Absolute Mono # 0.8 0.1 - 1.3 K/UL    Absolute Eos # 0.2 0.0 - 0.8 K/UL    Basophils Absolute 0.0 0.0 - 0.2 K/UL    Absolute Immature Granulocyte 0.0 0.0 - 0.5 K/UL   Comprehensive Metabolic Panel    Collection Time: 09/27/22  5:54 PM   Result Value Ref Range    Sodium 134 (L) 136 - 145 mmol/L    Potassium 4.6 3.5 - 5.1 mmol/L    Chloride 102 101 - 110 mmol/L    CO2 26 21 - 32 mmol/L    Anion Gap 6 4 - 13 mmol/L    Glucose 93 65 - 100 mg/dL    BUN 16 8 - 23 MG/DL    Creatinine 1.00 0.6 - 1.0 MG/DL    GFR African American >60 >60 ml/min/1.73m2    GFR Non- 58 (L) >60 ml/min/1.73m2    Calcium 10.2 8.3 - 10.4 MG/DL    Total Bilirubin 0.5 0.2 - 1.1 MG/DL    ALT 25 12 - 65 U/L    AST 16 15 - 37 U/L    Alk Phosphatase 77 50 - 136 U/L    Total Protein 7.3 6.3 - 8.2 g/dL    Albumin 3.7 3.2 - 4.6 g/dL    Globulin 3.6 (H) 2.3 - 3.5 g/dL    Albumin/Globulin Ratio 1.0 (L) 1.2 - 3.5     Basic Metabolic Panel    Collection Time: 09/28/22  5:02 AM   Result Value Ref Range    Sodium 136 136 - 145 mmol/L    Potassium 4.8 3.5 - 5.1 mmol/L    Chloride 105 101 - 110 mmol/L    CO2 29 21 - 32 mmol/L    Anion Gap 2 (L) 4 - 13 mmol/L    Glucose 79 65 - 100 mg/dL    BUN 18 8 - 23 MG/DL    Creatinine 1.21 (H) 0.6 - 1.0 MG/DL    GFR  57 (L) >60 ml/min/1.73m2    GFR Non- 47 (L) >60 ml/min/1.73m2    Calcium 9.0 8.3 - 10.4 MG/DL   C-Reactive Protein    Collection Time: 09/28/22  5:02 AM   Result Value Ref Range    CRP 0.6 0.0 - 0.9 mg/dL   CK    Collection Time: 09/28/22  5:02 AM   Result Value Ref Range    Total CK 74 21 - 215 U/L   Magnesium    Collection Time: 09/28/22  5:02 AM   Result Value Ref Range    Magnesium 2.1 1.8 - 2.4 mg/dL   Sedimentation Rate    Collection Time: 09/28/22  5:02 AM   Result Value Ref Range    Sed Rate, Automated 14 0 - 30 mm/hr   Troponin    Collection Time: 09/28/22  5:02 AM   Result Value Ref Range    Troponin, High Sensitivity 8.4 0 - 14 pg/mL   TSH with Reflex    Collection Time: 09/28/22  5:02 AM   Result Value Ref Range TSH w Free Thyroid if Abnormal 2.80 0.358 - 3.740 UIU/ML   Vitamin B12    Collection Time: 09/28/22  5:02 AM   Result Value Ref Range    Vitamin B-12 5622 (H) 193 - 986 pg/mL   Brain Natriuretic Peptide    Collection Time: 09/28/22  5:02 AM   Result Value Ref Range    NT Pro- (H) 5 - 125 PG/ML   Transthoracic echocardiogram (TTE) complete with contrast, bubble, strain, and 3D PRN    Collection Time: 09/28/22  9:00 AM   Result Value Ref Range    LV EDV A2C 108 mL    LV EDV A4C 116 mL    LV ESV A2C 38 mL    LV ESV A4C 43 mL    IVSd 0.8 0.6 - 0.9 cm    LVIDd 4.4 3.9 - 5.3 cm    LVIDs 3.1 cm    LVOT Diameter 2.0 cm    LVOT Mean Gradient 2 mmHg    LVOT VTI 24.9 cm    LVOT Peak Velocity 1.1 m/s    LVOT Peak Gradient 4 mmHg    LVPWd 1.0 (A) 0.6 - 0.9 cm    LV E' Lateral Velocity 10 cm/s    LV E' Septal Velocity 5 cm/s    LV Ejection Fraction A2C 65 %    LV Ejection Fraction A4C 63 %    EF BP 64 55 - 100 %    LVOT Area 3.1 cm2    LVOT SV 78.2 ml    LA Minor Axis 5.7 cm    LA Major [place] 6.2 cm    LA Area 2C 23.6 cm2    LA Area 4C 27.8 cm2    LA Volume BP 94 (A) 22 - 52 mL    LA Diameter 4.9 cm    AV Mean Gradient 4 mmHg    AV VTI 33.1 cm    AV Mean Velocity 0.9 m/s    AV Peak Velocity 1.3 m/s    AV Peak Gradient 7 mmHg    AV Area by VTI 2.4 cm2    AV Area by Peak Velocity 2.6 cm2    Aortic Root 2.5 cm    Ascending Aorta 2.9 cm    MV E Wave Deceleration Time 159.0 ms    MV A Velocity 0.92 m/s    MV E Velocity 0.92 m/s    MV Mean Gradient 1 mmHg    MV VTI 35.0 cm    MV Mean Velocity 0.5 m/s    MV Max Velocity 1.1 m/s    MV Peak Gradient 5 mmHg    MV Area by VTI 2.2 cm2    PV .0 ms    PV Max Velocity 1.0 m/s    PV Peak Gradient 4 mmHg    RV Basal Dimension 3.7 cm    TAPSE 1.8 1.7 cm    TR Max Velocity 2.21 m/s    TR Peak Gradient 20 mmHg    Body Surface Area 1.79 m2    Fractional Shortening 2D 30 28 - 44 %    LV ESV Index A4C 25 mL/m2    LV EDV Index A4C 67 mL/m2    LV ESV Index A2C 22 mL/m2    LV EDV Index A2C 63 mL/m2    LVIDd Index 2.56 cm/m2    LVIDs Index 1.80 cm/m2    LV RWT Ratio 0.45     LV Mass 2D 128.0 67 - 162 g    LV Mass 2D Index 74.4 43 - 95 g/m2    MV E/A 1.00     E/E' Ratio (Averaged) 13.80     E/E' Lateral 9.20     E/E' Septal 18.40     LA Volume Index BP 55 (A) 16 - 34 ml/m2    LVOT Stroke Volume Index 45.5 mL/m2    LA Size Index 2.85 cm/m2    LA/AO Root Ratio 1.96     Ao Root Index 1.45 cm/m2    Ascending Aorta Index 1.69 cm/m2    AV Velocity Ratio 0.85     LVOT:AV VTI Index 0.75     BETO/BSA VTI 1.4 cm2/m2    BETO/BSA Peak Velocity 1.5 cm2/m2    MV:LVOT VTI Index 1.41     Est. RA Pressure 3 mmHg    RVSP 23 mmHg   Basic Metabolic Panel w/ Reflex to MG    Collection Time: 09/29/22  4:40 AM   Result Value Ref Range    Sodium 137 136 - 145 mmol/L    Potassium 4.0 3.5 - 5.1 mmol/L    Chloride 102 101 - 110 mmol/L    CO2 28 21 - 32 mmol/L    Anion Gap 7 4 - 13 mmol/L    Glucose 86 65 - 100 mg/dL    BUN 14 8 - 23 MG/DL    Creatinine 0.92 0.6 - 1.0 MG/DL    GFR African American >60 >60 ml/min/1.73m2    GFR Non- >60 >60 ml/min/1.73m2    Calcium 8.7 8.3 - 10.4 MG/DL   CBC with Auto Differential    Collection Time: 09/29/22  4:40 AM   Result Value Ref Range    WBC 6.0 4.3 - 11.1 K/uL    RBC 4.25 4.05 - 5.2 M/uL    Hemoglobin 13.4 11.7 - 15.4 g/dL    Hematocrit 39.9 35.8 - 46.3 %    MCV 93.9 79.6 - 97.8 FL    MCH 31.5 26.1 - 32.9 PG    MCHC 33.6 31.4 - 35.0 g/dL    RDW 11.9 11.9 - 14.6 %    Platelets 958 036 - 046 K/uL    MPV 8.7 (L) 9.4 - 12.3 FL    nRBC 0.00 0.0 - 0.2 K/uL    Differential Type AUTOMATED      Seg Neutrophils 57 43 - 78 %    Lymphocytes 24 13 - 44 %    Monocytes 14 (H) 4.0 - 12.0 %    Eosinophils % 4 0.5 - 7.8 %    Basophils 1 0.0 - 2.0 %    Immature Granulocytes 1 0.0 - 5.0 %    Segs Absolute 3.4 1.7 - 8.2 K/UL    Absolute Lymph # 1.5 0.5 - 4.6 K/UL    Absolute Mono # 0.9 0.1 - 1.3 K/UL    Absolute Eos # 0.2 0.0 - 0.8 K/UL    Basophils Absolute 0.1 0.0 - 0.2 K/UL    Absolute Immature Granulocyte 0.0 0.0 - 0.5 K/UL     No results found for: CHOL, CHOLPOCT, CHOLX, CHLST, CHOLV, HDL, HDLPOC, HDLC, LDL, LDLC, VLDLC, VLDL, TGLX, TRIGL    No results found for any visits on 10/14/22. Miguelina Stone was seen today for new patient. Diagnoses and all orders for this visit:    Bradycardia    Essential (primary) hypertension    Peripheral vascular disease, unspecified ([facility] [place] [address]    Other orders  -     furosemide (LASIX) 20 MG tablet; Take 1 tablet by mouth as needed (Weight gain greater than 5 pounds in 48 hours)    Return in about 6 months (around 4/14/2023).        Mayuri Siegel MD  10/14/2022  10:48 AM

## 2022-10-31 NOTE — TELEPHONE ENCOUNTER
MEDICATION REFILL REQUEST      Name of Medication:  Xarelto  Dose:  2.5 mg  Frequency:  2 pills a day  Quantity:  ?  Days' supply:  ?       Pharmacy Name/Location:  [id number]

## 2022-11-22 ENCOUNTER — OFFICE VISIT (OUTPATIENT)
Dept: VASCULAR SURGERY | Age: 72
End: 2022-11-22
Payer: MEDICARE

## 2022-11-22 VITALS
BODY MASS INDEX: 33.38 KG/M2 | TEMPERATURE: 97.3 F | SYSTOLIC BLOOD PRESSURE: 128 MMHG | WEIGHT: 170 LBS | OXYGEN SATURATION: 97 % | DIASTOLIC BLOOD PRESSURE: 78 MMHG | HEART RATE: 81 BPM | HEIGHT: 60 IN

## 2022-11-22 DIAGNOSIS — I73.9 PAD (PERIPHERAL ARTERY DISEASE) (HCC): Primary | ICD-10-CM

## 2022-11-22 DIAGNOSIS — Z98.62 S/P PERIPHERAL ARTERY ANGIOPLASTY: ICD-10-CM

## 2022-11-22 PROCEDURE — 1090F PRES/ABSN URINE INCON ASSESS: CPT | Performed by: NURSE PRACTITIONER

## 2022-11-22 PROCEDURE — 99213 OFFICE O/P EST LOW 20 MIN: CPT | Performed by: NURSE PRACTITIONER

## 2022-11-22 PROCEDURE — G8427 DOCREV CUR MEDS BY ELIG CLIN: HCPCS | Performed by: NURSE PRACTITIONER

## 2022-11-22 PROCEDURE — 1123F ACP DISCUSS/DSCN MKR DOCD: CPT | Performed by: NURSE PRACTITIONER

## 2022-11-22 PROCEDURE — G8417 CALC BMI ABV UP PARAM F/U: HCPCS | Performed by: NURSE PRACTITIONER

## 2022-11-22 PROCEDURE — G8400 PT W/DXA NO RESULTS DOC: HCPCS | Performed by: NURSE PRACTITIONER

## 2022-11-22 PROCEDURE — 3078F DIAST BP <80 MM HG: CPT | Performed by: NURSE PRACTITIONER

## 2022-11-22 PROCEDURE — 3017F COLORECTAL CA SCREEN DOC REV: CPT | Performed by: NURSE PRACTITIONER

## 2022-11-22 PROCEDURE — 3074F SYST BP LT 130 MM HG: CPT | Performed by: NURSE PRACTITIONER

## 2022-11-22 PROCEDURE — G8484 FLU IMMUNIZE NO ADMIN: HCPCS | Performed by: NURSE PRACTITIONER

## 2022-11-22 PROCEDURE — 1036F TOBACCO NON-USER: CPT | Performed by: NURSE PRACTITIONER

## 2022-11-22 ASSESSMENT — PATIENT HEALTH QUESTIONNAIRE - PHQ9
SUM OF ALL RESPONSES TO PHQ QUESTIONS 1-9: 0
SUM OF ALL RESPONSES TO PHQ QUESTIONS 1-9: 0
2. FEELING DOWN, DEPRESSED OR HOPELESS: 0
SUM OF ALL RESPONSES TO PHQ QUESTIONS 1-9: 0
SUM OF ALL RESPONSES TO PHQ9 QUESTIONS 1 & 2: 0
SUM OF ALL RESPONSES TO PHQ QUESTIONS 1-9: 0
1. LITTLE INTEREST OR PLEASURE IN DOING THINGS: 0

## 2022-11-22 NOTE — PROGRESS NOTES
DATE OF VISIT: 11/22/2022      ANDREW Handy is a 81 y.o. female is here in follow up for 6-month lower extremity duplex study. She denies any lifestyle limiting claudication, rest pain or new open ulcerations. She remains on Xarelto and aspirin. She has stopped smoking. She has undergone bilateral iliac stenting. MEDICAL HISTORY:   Past Medical History:   Diagnosis Date    Adverse effect of anesthesia     hard to wake after    Angina pectoris ([address] 10/13/2015    Arthritis     hands & hips    CAD (coronary artery disease)     5/06 MI and 4 vessel CABG, patient states she has angina pain-last time takingNTG 2 week ago    Chest pain, unspecified 5/27/2011    Coronary atherosclerosis of native coronary artery 10/13/2015    Dyslipidemia 5/27/2011    Dyspnea 10/13/2015    Essential tremor 9/27/2022    Extremity atherosclerosis with intermittent claudication ([facility] [address] 10/13/2015    GERD (gastroesophageal reflux disease)     tums    Heart failure (HCC)     Hx of CABG 5/27/2011    LIMA and 3 SVG. 2 OMs/PLV and one acute marginal of RCA     Hypertension     LVH (left ventricular hypertrophy) 10/13/2015    MI, old     Peripheral arterial disease ([address] 6/29/2012    Pleural effusion 10/13/2015    Psychiatric disorder     S/P coronary artery stent placement 5/27/2011    2.0 x 15mm Xience to OM2-5/27/11     S/P peripheral artery angioplasty 6/29/2012    Severe obesity (BMI 35.0-39. 9) 7/19/2018    Sleep apnea     does not wear cpap- weight loss    Tobacco use disorder 10/13/2015    Unspecified adverse effect of anesthesia     slow to wake up         SURGICAL HISTORY:   Past Surgical History:   Procedure Laterality Date    APPENDECTOMY      BREAST SURGERY      biopsy left    CHOLECYSTECTOMY      08/01/09    GASTRIC BYPASS SURGERY      gastric bypass    HYSTERECTOMY (CERVIX STATUS UNKNOWN)       partial hyst.,bilat oopherectomy    OVARY REMOVAL      VT CARDIAC SURG PROCEDURE UNLIST      4 vessel CABG - stent x3 UROLOGICAL SURGERY      bladder tack    VASCULAR SURGERY Bilateral 11/12/2018    iliac angio and stent       Review of Systems:  Constitutional:   Negative for fevers and unexplained weight loss. Respiratory:   Negative for hemoptysis. Cardiovascular:   Negative except as noted in HPI. Musculoskeletal:  Negative for active, unexplained/severe joint pain. ALLERGIES:   Allergies   Allergen Reactions    Penicillins Hives     Has taken penicillin without any reactions - states she is no longer allergic     Adhesive Tape Rash     Paper tape ok    Codeine Nausea And Vomiting       CURRENT MEDICATIONS:  Current Outpatient Medications   Medication Sig Dispense Refill    rivaroxaban (XARELTO) 2.5 MG TABS tablet Take 1 tablet by mouth daily (with breakfast) 90 tablet 3    buPROPion (WELLBUTRIN XL) 300 MG extended release tablet TAKE 1 TABLET BY MOUTH EVERY DAY IN THE MORNING FOR 90 DAYS      escitalopram (LEXAPRO) 20 MG tablet TAKE 1 TABLET BY MOUTH EVERY DAY      acetaminophen (TYLENOL) 500 MG tablet Take 1,000 mg by mouth every 6 hours as needed for Pain. Vitamin D, Cholecalciferol, 25 MCG (1000 UT) TABS Take 1 tablet by mouth daily. Magnesium 100 MG TABS Take 1 tablet by mouth daily. Omega-3 Fatty Acids (FISH OIL) 1200 MG CAPS Take 1 capsule by mouth daily. Biotin 10 MG CAPS Take 1 capsule by mouth daily      potassium gluconate 550 mg tablet Take 595 mg by mouth daily      furosemide (LASIX) 20 MG tablet Take 1 tablet by mouth as needed (Weight gain greater than 5 pounds in 48 hours) 30 tablet 2    aspirin 81 MG EC tablet Take 1 tablet by mouth daily 30 tablet 0     No current facility-administered medications for this visit. IMAGING: Interpretation Summary         Right side findings: Resting ANABEL is 1.37. The lower extremity arterial duplex reveals mild, diffuse atherosclerosis without stenosis or occlusion. The great toe pressure is 107mmHg.     Left side findings: Resting ANABEL is 1.14. The lower extremity arterial duplex reveals mild, diffuse atherosclerosis without stenosis or occlusion. The great toe pressure is 89mmHg. The bilateral common iliac stents are patent. Procedure Details    A gray scale, color Doppler imaging and spectral Doppler analysis ultrasound was performed. During the study longitudinal and transverse views were obtained. Pulsed wave doppler, pulsed volume recording (PVR) and photo plethysmography was performed. Overall the study quality was good. Abdominal Findings      Abdominal Aorta    Invasive Procedure History:   The patient has an invasive procedure history of left iliac PTA with stent and right iliac PTA with stent. The left iliac PTA with stent is patent. The right iliac PTA with stent is patent. No abdominal aortic aneurysm (AAA) is present. Lower Extremity Arterial Findings      Right Lower Arterial    Proximal Common Femoral Artery: Multiphasic Doppler waveforms. Profunda Artery: Multiphasic Doppler waveforms. Proximal Superficial Femoral Artery: Multiphasic Doppler waveforms. Middle Superficial Femoral Artery: Multiphasic Doppler waveforms. Distal Superficial Femoral Artery: Multiphasic Doppler waveforms. Proximal Popliteal Artery: Multiphasic Doppler waveforms. Distal Popliteal Artery: Multiphasic Doppler waveforms. Anterior Tibial Artery: Multiphasic Doppler waveforms. Posterior Tibial Artery: Multiphasic Doppler waveforms. Peroneal Artery: multiphasic Doppler waveforms. Left Lower Arterial    Proximal Common Femoral Artery: Multiphasic Doppler waveforms. Profunda Artery: Multiphasic Doppler waveforms. .   Proximal Superficial Femoral Artery: Multiphasic Doppler waveforms. Distal Superficial Femoral Artery: Multiphasic Doppler waveforms. Proximal Popliteal Artery: Multiphasic Doppler waveforms. Distal Popliteal Artery: Multiphasic Doppler waveforms. Anterior Tibial Artery: Multiphasic Doppler waveforms. Posterior Tibial Artery: Multiphasic Doppler waveforms. Peroneal Artery: Multiphasic Doppler waveforms.        Abdominal Aorta Measurements     PSV AP TR   Dist Ao 109 cm/s        1.74 cm        1.79 cm            Iliac Artery Measurements     PSV Jabari Ratio   Rt ALFREDITO Prox 203 cm/s           Rt ALFREDITO Mid 201 cm/s           Rt ALFREDITO Dist 145 cm/s           Lt ALFREDITO Prox 247 cm/s           Lt ALFREDITO Mid 228 cm/s           Lt ALFREDITO Dist 182 cm/s           Rt EIA Prox 274 cm/s        1.9          Rt EIA Mid 184 cm/s        0.7          Lt EIA Prox 222 cm/s        1.22          Lt EIA Mid 201 cm/s        0.91            Right Lower Arterial Measurements     PSV Jabari Ratio   CFA Prox 230 cm/s           PFA Prox 248 cm/s           SFA Prox 160 cm/s        0.7          SFA Mid 176 cm/s        1.1          SFA Dist 165 cm/s        0.94          Pop Prox 79.5 cm/s        0.48          Pop Dist 124 cm/s              PTA Dist 97.2 cm/s           JEFERSON Dist 93.8 cm/s           Nj Dist 35.4 cm/s             Left Lower Arterial Measurements     PSV Jabari Ratio   CFA Prox 207 cm/s           PFA Prox 189 cm/s           SFA Prox 129 cm/s        0.62          SFA Mid 134 cm/s        1.04          SFA Dist 111 cm/s        0.83          Pop Prox 90 cm/s        0.81          Pop Dist 134 cm/s              PTA Dist 78 cm/s           JEFERSON Dist 82.3 cm/s           Nj Dist 52 cm/s             Arterial Pressure Measurements     Right Left   Brachial  mmHg        129 mmHg          Post Tibial  mmHg        133 mmHg          Dorslis Pedis  mmHg        147 mmHg          ANABEL 1.37 ratio        1.14 ratio          Toe Pressure 107 mmHg        89 mmHg          TBI 0.83 ratio        0.69 ratio            Right Graft/Stent 1 Measurements     PSV EDV   Name Right ALFREDITO stent           Inflow Artery 109 cm/s        0 cm/s          Prox 203 cm/s        0 cm/s          Mid 201 cm/s        0 cm/s          Dist 145 cm/s        0 cm/s          Outflow Vessel 274 cm/s        0 cm/s                      Left Graft/Stent 1 Measurements     PSV EDV   Name Left ALFREDITO stent           Inflow Artery 109 cm/s        0 cm/s          Prox 247 cm/s        0 cm/s          Mid 228 cm/s        0 cm/s          Dist 182 cm/s        0 cm/s          Outflow Vessel 222 cm/s        0 cm/s                      Procedure Staff    Technologist/Clinician: Yuliya Dill  Supporting Staff: None  Performing Physician/Midlevel: None     Exam Completion Date/Time: 11/22/22  2:00 PM      PHYSICAL EXAM  VITALS: /78 (Site: Right Upper Arm, Position: Sitting, Cuff Size: Medium Adult)   Pulse 81   Temp 97.3 °F (36.3 °C) (Temporal)   Ht 5' (1.524 m)   Wt 170 lb (77.1 kg)   SpO2 97%   BMI 33.20 kg/m²       GENERAL: Well developed, well nourished, in NAD  HEAD/NECK: normocephalic, atraumatic, neck supple  HEART: Regular rate and rhythm  ABDOMEN: soft, nontender, nondistended  EXTREMITIES: upper without CCE with palpable radial and brachial pulses. Lower extremities: palpable distal pulses  MUSCULOSKELETAL: cane  NEURO: sensation and strength grossly intact and symmetrical  PSYCH: alert and oriented to person, place and time      Assessment/Plan: Patient is a 68-year-old female who follows up for her 6-month lower extremity duplex study. She denies lifestyle limiting claudication, rest pain or open ulcerations. She has undergone bilateral iliac stenting. She will continue her Xarelto and ASA and return in 6 months for repeat duplex study. She is to call with any lifestyle limiting claudication, rest pain or open ulcerations.         Suzy Dominguez, APRN - CNP    20 minutes of time was spent on this encounter including chart review, assessment and evaluation

## 2023-01-05 ENCOUNTER — TELEPHONE (OUTPATIENT)
Dept: CARDIOLOGY CLINIC | Age: 73
End: 2023-01-05

## 2023-01-05 NOTE — TELEPHONE ENCOUNTER
Patient requested to schedule pacemaker surgery. Patient states it was discussed at last appointment 10/14/2022. Please advise.

## 2023-01-11 ENCOUNTER — OFFICE VISIT (OUTPATIENT)
Dept: CARDIOLOGY CLINIC | Age: 73
End: 2023-01-11
Payer: MEDICARE

## 2023-01-11 VITALS
DIASTOLIC BLOOD PRESSURE: 80 MMHG | HEART RATE: 64 BPM | HEIGHT: 60 IN | SYSTOLIC BLOOD PRESSURE: 128 MMHG | WEIGHT: 171 LBS | BODY MASS INDEX: 33.57 KG/M2

## 2023-01-11 DIAGNOSIS — R00.1 BRADYCARDIA: ICD-10-CM

## 2023-01-11 DIAGNOSIS — I10 ESSENTIAL (PRIMARY) HYPERTENSION: Primary | ICD-10-CM

## 2023-01-11 DIAGNOSIS — E78.5 HYPERLIPIDEMIA, UNSPECIFIED HYPERLIPIDEMIA TYPE: ICD-10-CM

## 2023-01-11 DIAGNOSIS — R25.1 TREMOR: ICD-10-CM

## 2023-01-11 PROCEDURE — 3074F SYST BP LT 130 MM HG: CPT | Performed by: INTERNAL MEDICINE

## 2023-01-11 PROCEDURE — 3079F DIAST BP 80-89 MM HG: CPT | Performed by: INTERNAL MEDICINE

## 2023-01-11 PROCEDURE — G8400 PT W/DXA NO RESULTS DOC: HCPCS | Performed by: INTERNAL MEDICINE

## 2023-01-11 PROCEDURE — 1036F TOBACCO NON-USER: CPT | Performed by: INTERNAL MEDICINE

## 2023-01-11 PROCEDURE — 1090F PRES/ABSN URINE INCON ASSESS: CPT | Performed by: INTERNAL MEDICINE

## 2023-01-11 PROCEDURE — 1123F ACP DISCUSS/DSCN MKR DOCD: CPT | Performed by: INTERNAL MEDICINE

## 2023-01-11 PROCEDURE — 99214 OFFICE O/P EST MOD 30 MIN: CPT | Performed by: INTERNAL MEDICINE

## 2023-01-11 PROCEDURE — 3017F COLORECTAL CA SCREEN DOC REV: CPT | Performed by: INTERNAL MEDICINE

## 2023-01-11 PROCEDURE — G8428 CUR MEDS NOT DOCUMENT: HCPCS | Performed by: INTERNAL MEDICINE

## 2023-01-11 PROCEDURE — G8417 CALC BMI ABV UP PARAM F/U: HCPCS | Performed by: INTERNAL MEDICINE

## 2023-01-11 PROCEDURE — G8484 FLU IMMUNIZE NO ADMIN: HCPCS | Performed by: INTERNAL MEDICINE

## 2023-01-11 ASSESSMENT — ENCOUNTER SYMPTOMS
COUGH: 0
EYE PAIN: 0
NAIL CHANGES: 0
STRIDOR: 0
APHONIA: 0
ABDOMINAL PAIN: 0

## 2023-01-11 NOTE — PROGRESS NOTES
[address] [place], [place]  [address] [place], [place] [address], [address]  PHONE: [phone]    SUBJECTIVE:   Caden Mei is a 81 y.o. female 1950   seen for a follow up visit regarding the following:     Chief Complaint   Patient presents with    Bradycardia       History of present illness: 81 y.o. female presented for follow-up 1/11/23 Fatigue worse has had CI on previous monitor discussed class II indications for pacemaker in the past patient has been deciding on proceeding with pacemaker. Feels worse now she is having falls. Dyspnea with activity exercise intolerance. Additionally tremor is worse she has seen Dr. Richar Clement in the past    Cardiac history:    Left Ventricle: Normal left ventricular systolic function with a visually estimated EF of 60 - 65%. Left ventricle size is normal. Mildly increased wall thickness. Normal wall motion. Abnormal diastolic function. Mitral Valve: Moderate regurgitation. Tricuspid Valve: Moderate regurgitation. The estimated RVSP is 23 mmHg. Left Atrium: Left atrium is severely dilated. Right Atrium: Right atrium is severely dilated. Contrast used: Definity. 10/2022 The minimum heart rate was 52 BPM, occurring at 7:19:44 AM. The maximum rate was 106  BPM, occurring at 7:03:03 PM(2). The average heart rate was 66 BPM.       Assessment:   81 y.o. female  history of coronary artery disease peripheral vascular disease hypertension tobacco abuse with near syncopal episodes. Chronotropic incompetence  Class II indications for device therapy average heart rate 66 bpm  Plan for DCPM      Peripheral arterial disease  On Xarelto     Hyperlipidemia  Lipitor     Hypertension  Key CAD CHF Meds            rivaroxaban (XARELTO) 2.5 MG TABS tablet (Taking)    Sig - Route: Take 1 tablet by mouth daily (with breakfast) - Oral    furosemide (LASIX) 20 MG tablet (Taking)    Sig - Route:  Take 1 tablet by mouth as needed (Weight gain greater than 5 pounds in 48 hours) - Oral             Current Outpatient Medications   Medication Sig    rivaroxaban (XARELTO) 2.5 MG TABS tablet Take 1 tablet by mouth daily (with breakfast)    buPROPion (WELLBUTRIN XL) 300 MG extended release tablet TAKE 1 TABLET BY MOUTH EVERY DAY IN THE MORNING FOR 90 DAYS    escitalopram (LEXAPRO) 20 MG tablet TAKE 1 TABLET BY MOUTH EVERY DAY    furosemide (LASIX) 20 MG tablet Take 1 tablet by mouth as needed (Weight gain greater than 5 pounds in 48 hours)    acetaminophen (TYLENOL) 500 MG tablet Take 1,000 mg by mouth every 6 hours as needed for Pain. Vitamin D, Cholecalciferol, 25 MCG (1000 UT) TABS Take 1 tablet by mouth daily. Magnesium 100 MG TABS Take 1 tablet by mouth daily. Omega-3 Fatty Acids (FISH OIL) 1200 MG CAPS Take 1 capsule by mouth daily. aspirin 81 MG EC tablet Take 1 tablet by mouth daily    Biotin 10 MG CAPS Take 1 capsule by mouth daily    potassium gluconate 550 mg tablet Take 595 mg by mouth daily     No current facility-administered medications for this visit. Past Medical History, Past Surgical History, Family history, Social History, and Medications were all reviewed with the patient today and updated as necessary.        Allergies   Allergen Reactions    Penicillins Hives     Has taken penicillin without any reactions - states she is no longer allergic     Adhesive Tape Rash     Paper tape ok    Codeine Nausea And Vomiting     Past Medical History:   Diagnosis Date    Adverse effect of anesthesia     hard to wake after    Angina pectoris ([facility] [address] 10/13/2015    Arthritis     hands & hips    CAD (coronary artery disease)     5/06 MI and 4 vessel CABG, patient states she has angina pain-last time takingNTG 2 week ago    Chest pain, unspecified 5/27/2011    Coronary atherosclerosis of native coronary artery 10/13/2015    Dyslipidemia 5/27/2011    Dyspnea 10/13/2015    Essential tremor 9/27/2022    Extremity atherosclerosis with intermittent claudication ([facility] [address] 10/13/2015    GERD (gastroesophageal reflux disease)     tums    Heart failure (HCC)     Hx of CABG 2011    LIMA and 3 SVG. 2 OMs/PLV and one acute marginal of RCA     Hypertension     LVH (left ventricular hypertrophy) 10/13/2015    MI, old     Peripheral arterial disease ([address] 2012    Pleural effusion 10/13/2015    Psychiatric disorder     S/P coronary artery stent placement 2011    2.0 x 15mm Xience to OM2-11     S/P peripheral artery angioplasty 2012    Severe obesity (BMI 35.0-39. 9) 2018    Sleep apnea     does not wear cpap- weight loss    Tobacco use disorder 10/13/2015    Unspecified adverse effect of anesthesia     slow to wake up     Past Surgical History:   Procedure Laterality Date    APPENDECTOMY      BREAST SURGERY      biopsy left    CHOLECYSTECTOMY      09    GASTRIC BYPASS SURGERY      gastric bypass    HYSTERECTOMY (CERVIX STATUS UNKNOWN)       partial hyst.,bilat oopherectomy    OVARY REMOVAL      [place] UNLISTED PROCEDURE CARDIAC SURGERY      4 vessel CABG - stent x3    UROLOGICAL SURGERY      bladder tack    VASCULAR SURGERY Bilateral 2018    iliac angio and stent     Family History   Problem Relation Age of Onset    Heart Attack Father     Heart Disease Mother     Colon Cancer Neg Hx     Heart Disease Brother       Social History     Tobacco Use    Smoking status: Former     Types: Cigarettes     Quit date: 2006     Years since quittin.6    Smokeless tobacco: Never    Tobacco comments:     Quit smokin/06   Substance Use Topics    Alcohol use: Yes     Alcohol/week: 0.8 standard drinks       ROS:    Review of Systems   Constitutional: Negative for fever. HENT:  Negative for stridor. Eyes:  Negative for pain. Cardiovascular:  Negative for chest pain. Respiratory:  Negative for cough. Endocrine: Negative for cold intolerance. Skin:  Negative for nail changes. Musculoskeletal:  Negative for arthritis.    Gastrointestinal:  Negative for abdominal pain. Genitourinary:  Negative for dysuria. Neurological:  Negative for aphonia. Psychiatric/Behavioral:  Negative for altered mental status. Allergic/Immunologic: Negative for hives. PHYSICAL EXAM:    /80   Pulse 64   Ht 5' (1.524 m)   Wt 171 lb (77.6 kg)   BMI 33.40 kg/m²        Wt Readings from Last 3 Encounters:   01/11/23 171 lb (77.6 kg)   11/22/22 170 lb (77.1 kg)   10/14/22 168 lb 6.4 oz (76.4 kg)     BP Readings from Last 3 Encounters:   01/11/23 128/80   11/22/22 128/78   10/14/22 132/78         Physical Exam  Vitals reviewed. HENT:      Head: Normocephalic. Right Ear: External ear normal.      Left Ear: External ear normal.      Nose: Nose normal.   Eyes:      General: No scleral icterus. Pulmonary:      Effort: Pulmonary effort is normal.   Abdominal:      General: There is no distension. Musculoskeletal:      Cervical back: Neck supple. Skin:     General: Skin is warm. Neurological:      Mental Status: She is alert. Mental status is at baseline. Medical problems and test results were reviewed with the patient today. No results found for this or any previous visit (from the past 672 hour(s)). No results found for: CHOL, CHOLPOCT, CHOLX, CHLST, CHOLV, HDL, HDLPOC, HDLC, LDL, LDLC, VLDLC, VLDL, TGLX, TRIGL    No results found for any visits on 01/11/23. Osito Llanes was seen today for bradycardia. Diagnoses and all orders for this visit:    Essential (primary) hypertension    Bradycardia  -     Case Request Cardiac Cath Lab  -     CBC with Auto Differential; Future  -     Basic Metabolic Panel; Future    Hyperlipidemia, unspecified hyperlipidemia type    Tremor  -     [facility] - Deborah Olvera DO, Neurology, [facility] [address] [place] [address]    Return in about 4 months (around 5/11/2023).        Josias Blum MD  1/11/2023  8:05 AM

## 2023-01-13 DIAGNOSIS — R00.1 BRADYCARDIA: ICD-10-CM

## 2023-01-13 LAB
ANION GAP SERPL CALC-SCNC: 6 MMOL/L (ref 2–11)
BASOPHILS # BLD: 0.1 K/UL (ref 0–0.2)
BASOPHILS NFR BLD: 1 % (ref 0–2)
BUN SERPL-MCNC: 12 MG/DL (ref 8–23)
CALCIUM SERPL-MCNC: 10.4 MG/DL (ref 8.3–10.4)
CHLORIDE SERPL-SCNC: 103 MMOL/L (ref 101–110)
CO2 SERPL-SCNC: 28 MMOL/L (ref 21–32)
CREAT SERPL-MCNC: 1.1 MG/DL (ref 0.6–1)
DIFFERENTIAL METHOD BLD: ABNORMAL
EOSINOPHIL # BLD: 0.2 K/UL (ref 0–0.8)
EOSINOPHIL NFR BLD: 3 % (ref 0.5–7.8)
ERYTHROCYTE [DISTWIDTH] IN BLOOD BY AUTOMATED COUNT: 12.4 % (ref 11.9–14.6)
GLUCOSE SERPL-MCNC: 97 MG/DL (ref 65–100)
HCT VFR BLD AUTO: 50 % (ref 35.8–46.3)
HGB BLD-MCNC: 16.6 G/DL (ref 11.7–15.4)
IMM GRANULOCYTES # BLD AUTO: 0 K/UL (ref 0–0.5)
IMM GRANULOCYTES NFR BLD AUTO: 1 % (ref 0–5)
LYMPHOCYTES # BLD: 2.2 K/UL (ref 0.5–4.6)
LYMPHOCYTES NFR BLD: 40 % (ref 13–44)
MCH RBC QN AUTO: 31.3 PG (ref 26.1–32.9)
MCHC RBC AUTO-ENTMCNC: 33.2 G/DL (ref 31.4–35)
MCV RBC AUTO: 94.3 FL (ref 82–102)
MONOCYTES # BLD: 0.5 K/UL (ref 0.1–1.3)
MONOCYTES NFR BLD: 8 % (ref 4–12)
NEUTS SEG # BLD: 2.6 K/UL (ref 1.7–8.2)
NEUTS SEG NFR BLD: 47 % (ref 43–78)
NRBC # BLD: 0 K/UL (ref 0–0.2)
PLATELET # BLD AUTO: 253 K/UL (ref 150–450)
PMV BLD AUTO: 9 FL (ref 9.4–12.3)
POTASSIUM SERPL-SCNC: 4.7 MMOL/L (ref 3.5–5.1)
RBC # BLD AUTO: 5.3 M/UL (ref 4.05–5.2)
SODIUM SERPL-SCNC: 137 MMOL/L (ref 133–143)
WBC # BLD AUTO: 5.6 K/UL (ref 4.3–11.1)

## 2023-01-25 NOTE — PROGRESS NOTES
Patient pre-assessment complete for PPM Insert scheduled for , arrival time 0730. Patient verified using . Patient instructed to bring a list of all home medications on the day of procedure. NPO status reinforced. Patient informed to take a full dose aspirin 325mg  or 81 mg x 4 on the day of procedure. Patient instructed to HOLD all medications except Aspirin, Wellbutrin and Lexapro. Instructed they can take all other medications excluding vitamins & supplements. Patient verbalizes understanding of all instructions & denies any questions at this time. Held Xarelto since .

## 2023-01-26 ENCOUNTER — HOSPITAL ENCOUNTER (OUTPATIENT)
Age: 73
Setting detail: OBSERVATION
Discharge: HOME | End: 2023-01-27
Attending: INTERNAL MEDICINE | Admitting: INTERNAL MEDICINE
Payer: MEDICARE

## 2023-01-26 ENCOUNTER — APPOINTMENT (OUTPATIENT)
Dept: GENERAL RADIOLOGY | Age: 73
End: 2023-01-26
Attending: INTERNAL MEDICINE
Payer: MEDICARE

## 2023-01-26 DIAGNOSIS — R00.1 BRADYCARDIA: ICD-10-CM

## 2023-01-26 LAB
ANION GAP SERPL CALC-SCNC: 8 MMOL/L (ref 2–11)
BASOPHILS # BLD: 0.1 K/UL (ref 0–0.2)
BASOPHILS NFR BLD: 1 % (ref 0–2)
BUN SERPL-MCNC: 12 MG/DL (ref 8–23)
CALCIUM SERPL-MCNC: 9.2 MG/DL (ref 8.3–10.4)
CHLORIDE SERPL-SCNC: 104 MMOL/L (ref 101–110)
CO2 SERPL-SCNC: 26 MMOL/L (ref 21–32)
CREAT SERPL-MCNC: 1.1 MG/DL (ref 0.6–1)
DIFFERENTIAL METHOD BLD: ABNORMAL
EKG ATRIAL RATE: 60 BPM
EKG DIAGNOSIS: NORMAL
EKG P AXIS: 46 DEGREES
EKG P-R INTERVAL: 160 MS
EKG Q-T INTERVAL: 482 MS
EKG QRS DURATION: 82 MS
EKG QTC CALCULATION (BAZETT): 482 MS
EKG R AXIS: 49 DEGREES
EKG T AXIS: 30 DEGREES
EKG VENTRICULAR RATE: 60 BPM
EOSINOPHIL # BLD: 0.2 K/UL (ref 0–0.8)
EOSINOPHIL NFR BLD: 3 % (ref 0.5–7.8)
ERYTHROCYTE [DISTWIDTH] IN BLOOD BY AUTOMATED COUNT: 12.5 % (ref 11.9–14.6)
GLUCOSE SERPL-MCNC: 93 MG/DL (ref 65–100)
HCT VFR BLD AUTO: 46 % (ref 35.8–46.3)
HGB BLD-MCNC: 16 G/DL (ref 11.7–15.4)
IMM GRANULOCYTES # BLD AUTO: 0 K/UL (ref 0–0.5)
IMM GRANULOCYTES NFR BLD AUTO: 1 % (ref 0–5)
INR PPP: 1.1
LYMPHOCYTES # BLD: 2.2 K/UL (ref 0.5–4.6)
LYMPHOCYTES NFR BLD: 32 % (ref 13–44)
MAGNESIUM SERPL-MCNC: 2.1 MG/DL (ref 1.8–2.4)
MCH RBC QN AUTO: 31.9 PG (ref 26.1–32.9)
MCHC RBC AUTO-ENTMCNC: 34.8 G/DL (ref 31.4–35)
MCV RBC AUTO: 91.6 FL (ref 82–102)
MONOCYTES # BLD: 0.7 K/UL (ref 0.1–1.3)
MONOCYTES NFR BLD: 11 % (ref 4–12)
NEUTS SEG # BLD: 3.5 K/UL (ref 1.7–8.2)
NEUTS SEG NFR BLD: 52 % (ref 43–78)
NRBC # BLD: 0 K/UL (ref 0–0.2)
PLATELET # BLD AUTO: 224 K/UL (ref 150–450)
PMV BLD AUTO: 8.7 FL (ref 9.4–12.3)
POTASSIUM SERPL-SCNC: 4.2 MMOL/L (ref 3.5–5.1)
PROTHROMBIN TIME: 14.2 SEC (ref 12.6–14.3)
RBC # BLD AUTO: 5.02 M/UL (ref 4.05–5.2)
SODIUM SERPL-SCNC: 138 MMOL/L (ref 133–143)
WBC # BLD AUTO: 6.7 K/UL (ref 4.3–11.1)

## 2023-01-26 PROCEDURE — G0378 HOSPITAL OBSERVATION PER HR: HCPCS

## 2023-01-26 PROCEDURE — 83735 ASSAY OF MAGNESIUM: CPT

## 2023-01-26 PROCEDURE — 80048 BASIC METABOLIC PNL TOTAL CA: CPT

## 2023-01-26 PROCEDURE — 99153 MOD SED SAME PHYS/QHP EA: CPT | Performed by: INTERNAL MEDICINE

## 2023-01-26 PROCEDURE — 2580000003 HC RX 258: Performed by: INTERNAL MEDICINE

## 2023-01-26 PROCEDURE — 6370000000 HC RX 637 (ALT 250 FOR IP): Performed by: NURSE PRACTITIONER

## 2023-01-26 PROCEDURE — 85025 COMPLETE CBC W/AUTO DIFF WBC: CPT

## 2023-01-26 PROCEDURE — C1898 LEAD, PMKR, OTHER THAN TRANS: HCPCS | Performed by: INTERNAL MEDICINE

## 2023-01-26 PROCEDURE — 33208 INSRT HEART PM ATRIAL & VENT: CPT | Performed by: INTERNAL MEDICINE

## 2023-01-26 PROCEDURE — 93005 ELECTROCARDIOGRAM TRACING: CPT | Performed by: INTERNAL MEDICINE

## 2023-01-26 PROCEDURE — 2500000003 HC RX 250 WO HCPCS: Performed by: INTERNAL MEDICINE

## 2023-01-26 PROCEDURE — 99152 MOD SED SAME PHYS/QHP 5/>YRS: CPT | Performed by: INTERNAL MEDICINE

## 2023-01-26 PROCEDURE — C1892 INTRO/SHEATH,FIXED,PEEL-AWAY: HCPCS | Performed by: INTERNAL MEDICINE

## 2023-01-26 PROCEDURE — 6360000002 HC RX W HCPCS: Performed by: INTERNAL MEDICINE

## 2023-01-26 PROCEDURE — L3660 SO 8 AB RSTR CAN/WEB PRE OTS: HCPCS | Performed by: INTERNAL MEDICINE

## 2023-01-26 PROCEDURE — 2709999900 HC NON-CHARGEABLE SUPPLY: Performed by: INTERNAL MEDICINE

## 2023-01-26 PROCEDURE — C1785 PMKR, DUAL, RATE-RESP: HCPCS | Performed by: INTERNAL MEDICINE

## 2023-01-26 PROCEDURE — 85610 PROTHROMBIN TIME: CPT

## 2023-01-26 PROCEDURE — 2580000003 HC RX 258: Performed by: NURSE PRACTITIONER

## 2023-01-26 PROCEDURE — 71045 X-RAY EXAM CHEST 1 VIEW: CPT

## 2023-01-26 DEVICE — PACEMAKER
Type: IMPLANTABLE DEVICE | Site: HEART | Status: FUNCTIONAL
Brand: ACCOLADE™ MRI DR

## 2023-01-26 DEVICE — PACE/SENSE LEAD
Type: IMPLANTABLE DEVICE | Site: HEART | Status: FUNCTIONAL
Brand: INGEVITY™+

## 2023-01-26 DEVICE — PACE/SENSE LEAD
Type: IMPLANTABLE DEVICE | Status: FUNCTIONAL
Brand: INGEVITY™+

## 2023-01-26 RX ORDER — SODIUM CHLORIDE 0.9 % (FLUSH) 0.9 %
5-40 SYRINGE (ML) INJECTION PRN
Status: DISCONTINUED | OUTPATIENT
Start: 2023-01-26 | End: 2023-01-27 | Stop reason: HOSPADM

## 2023-01-26 RX ORDER — AMOXICILLIN 500 MG
1 CAPSULE ORAL DAILY
Status: DISCONTINUED | OUTPATIENT
Start: 2023-01-26 | End: 2023-01-26

## 2023-01-26 RX ORDER — BUPROPION HYDROCHLORIDE 150 MG/1
300 TABLET, EXTENDED RELEASE ORAL DAILY
Status: DISCONTINUED | OUTPATIENT
Start: 2023-01-26 | End: 2023-01-27 | Stop reason: HOSPADM

## 2023-01-26 RX ORDER — SODIUM CHLORIDE 0.9 % (FLUSH) 0.9 %
5-40 SYRINGE (ML) INJECTION EVERY 12 HOURS SCHEDULED
Status: DISCONTINUED | OUTPATIENT
Start: 2023-01-26 | End: 2023-01-27 | Stop reason: HOSPADM

## 2023-01-26 RX ORDER — VITAMIN B COMPLEX
1 TABLET ORAL DAILY
Status: DISCONTINUED | OUTPATIENT
Start: 2023-01-26 | End: 2023-01-27 | Stop reason: HOSPADM

## 2023-01-26 RX ORDER — LIDOCAINE HYDROCHLORIDE AND EPINEPHRINE 10; 10 MG/ML; UG/ML
INJECTION, SOLUTION INFILTRATION; PERINEURAL PRN
Status: DISCONTINUED | OUTPATIENT
Start: 2023-01-26 | End: 2023-01-26 | Stop reason: HOSPADM

## 2023-01-26 RX ORDER — ASPIRIN 81 MG/1
81 TABLET ORAL DAILY
Status: DISCONTINUED | OUTPATIENT
Start: 2023-01-26 | End: 2023-01-27 | Stop reason: HOSPADM

## 2023-01-26 RX ORDER — HYDROCODONE BITARTRATE AND ACETAMINOPHEN 5; 325 MG/1; MG/1
1 TABLET ORAL EVERY 6 HOURS PRN
Status: DISCONTINUED | OUTPATIENT
Start: 2023-01-26 | End: 2023-01-27 | Stop reason: HOSPADM

## 2023-01-26 RX ORDER — SODIUM CHLORIDE 9 MG/ML
INJECTION, SOLUTION INTRAVENOUS CONTINUOUS
Status: DISCONTINUED | OUTPATIENT
Start: 2023-01-26 | End: 2023-01-27

## 2023-01-26 RX ORDER — ACETAMINOPHEN 500 MG
1000 TABLET ORAL EVERY 6 HOURS PRN
Status: DISCONTINUED | OUTPATIENT
Start: 2023-01-26 | End: 2023-01-27 | Stop reason: HOSPADM

## 2023-01-26 RX ORDER — BIOTIN 10000 MCG
1 CAPSULE ORAL DAILY
Status: DISCONTINUED | OUTPATIENT
Start: 2023-01-26 | End: 2023-01-26

## 2023-01-26 RX ORDER — LIDOCAINE HYDROCHLORIDE 10 MG/ML
INJECTION, SOLUTION INFILTRATION; PERINEURAL PRN
Status: DISCONTINUED | OUTPATIENT
Start: 2023-01-26 | End: 2023-01-26 | Stop reason: HOSPADM

## 2023-01-26 RX ORDER — SODIUM CHLORIDE 9 MG/ML
INJECTION, SOLUTION INTRAVENOUS PRN
Status: DISCONTINUED | OUTPATIENT
Start: 2023-01-26 | End: 2023-01-27 | Stop reason: HOSPADM

## 2023-01-26 RX ORDER — MIDAZOLAM HYDROCHLORIDE 1 MG/ML
INJECTION INTRAMUSCULAR; INTRAVENOUS PRN
Status: DISCONTINUED | OUTPATIENT
Start: 2023-01-26 | End: 2023-01-26 | Stop reason: HOSPADM

## 2023-01-26 RX ADMIN — HYDROCODONE BITARTRATE AND ACETAMINOPHEN 1 TABLET: 5; 325 TABLET ORAL at 18:19

## 2023-01-26 RX ADMIN — SODIUM CHLORIDE, PRESERVATIVE FREE 10 ML: 5 INJECTION INTRAVENOUS at 10:32

## 2023-01-26 RX ADMIN — Medication 2000 MG: at 11:57

## 2023-01-26 RX ADMIN — SODIUM CHLORIDE, PRESERVATIVE FREE 5 ML: 5 INJECTION INTRAVENOUS at 20:20

## 2023-01-26 ASSESSMENT — PAIN SCALES - GENERAL
PAINLEVEL_OUTOF10: 0
PAINLEVEL_OUTOF10: 6

## 2023-01-26 ASSESSMENT — PAIN DESCRIPTION - ORIENTATION: ORIENTATION: LEFT

## 2023-01-26 ASSESSMENT — PAIN DESCRIPTION - LOCATION: LOCATION: SHOULDER

## 2023-01-26 ASSESSMENT — PAIN DESCRIPTION - DESCRIPTORS: DESCRIPTORS: ACHING

## 2023-01-26 NOTE — H&P
Maynor Escobar MD   Physician   Specialty:  Cardiology   Progress Notes      Signed   Encounter Date:  1/11/2023                 Signed        Expand All Collapse All                                                                                                                                                                                                                                                                                                                                                                                                                                                                                                                                                                                                         [facility]  [address] [facility], [facility] [address]  PHONE: [phone]     SUBJECTIVE:   Aakash Langston is a 81 y.o. female 1950   seen for a follow up visit regarding the following:          Chief Complaint   Patient presents with    Bradycardia         History of present illness: 81 y.o. female presented for follow-up 1/11/23 Fatigue worse has had CI on previous monitor discussed class II indications for pacemaker in the past patient has been deciding on proceeding with pacemaker. Feels worse now she is having falls. Dyspnea with activity exercise intolerance. Additionally tremor is worse she has seen Dr. Carmen Dupree in the past     Cardiac history:    Left Ventricle: Normal left ventricular systolic function with a visually estimated EF of 60 - 65%. Left ventricle size is normal. Mildly increased wall thickness. Normal wall motion. Abnormal diastolic function. Mitral Valve: Moderate regurgitation. Tricuspid Valve: Moderate regurgitation. The estimated RVSP is 23 mmHg. Left Atrium: Left atrium is severely dilated. Right Atrium: Right atrium is severely dilated. Contrast used: Definity.   10/2022 The minimum heart rate was 52 BPM, occurring at 7:19:44 AM. The maximum rate was 106  BPM, occurring at 7:03:03 PM(2). The average heart rate was 66 BPM.        Assessment:   81 y.o. female  history of coronary artery disease peripheral vascular disease hypertension tobacco abuse with near syncopal episodes. Chronotropic incompetence  Class II indications for device therapy average heart rate 66 bpm  Plan for DCPM      Peripheral arterial disease  On Xarelto     Hyperlipidemia  Lipitor     Hypertension  Key CAD CHF Meds               rivaroxaban (XARELTO) 2.5 MG TABS tablet (Taking)     Sig - Route: Take 1 tablet by mouth daily (with breakfast) - Oral     furosemide (LASIX) 20 MG tablet (Taking)     Sig - Route: Take 1 tablet by mouth as needed (Weight gain greater than 5 pounds in 48 hours) - Oral                     Current Outpatient Medications   Medication Sig    rivaroxaban (XARELTO) 2.5 MG TABS tablet Take 1 tablet by mouth daily (with breakfast)    buPROPion (WELLBUTRIN XL) 300 MG extended release tablet TAKE 1 TABLET BY MOUTH EVERY DAY IN THE MORNING FOR 90 DAYS    escitalopram (LEXAPRO) 20 MG tablet TAKE 1 TABLET BY MOUTH EVERY DAY    furosemide (LASIX) 20 MG tablet Take 1 tablet by mouth as needed (Weight gain greater than 5 pounds in 48 hours)    acetaminophen (TYLENOL) 500 MG tablet Take 1,000 mg by mouth every 6 hours as needed for Pain. Vitamin D, Cholecalciferol, 25 MCG (1000 UT) TABS Take 1 tablet by mouth daily. Magnesium 100 MG TABS Take 1 tablet by mouth daily. Omega-3 Fatty Acids (FISH OIL) 1200 MG CAPS Take 1 capsule by mouth daily. aspirin 81 MG EC tablet Take 1 tablet by mouth daily    Biotin 10 MG CAPS Take 1 capsule by mouth daily    potassium gluconate 550 mg tablet Take 595 mg by mouth daily      No current facility-administered medications for this visit.          Past Medical History, Past Surgical History, Family history, Social History, and Medications were all reviewed with the patient today and updated as necessary. Allergies   Allergen Reactions    Penicillins Hives       Has taken penicillin without any reactions - states she is no longer allergic     Adhesive Tape Rash       Paper tape ok    Codeine Nausea And Vomiting      Past Medical History        Past Medical History:   Diagnosis Date    Adverse effect of anesthesia       hard to wake after    Angina pectoris ([address] 10/13/2015    Arthritis       hands & hips    CAD (coronary artery disease)       5/06 MI and 4 vessel CABG, patient states she has angina pain-last time takingNTG 2 week ago    Chest pain, unspecified 5/27/2011    Coronary atherosclerosis of native coronary artery 10/13/2015    Dyslipidemia 5/27/2011    Dyspnea 10/13/2015    Essential tremor 9/27/2022    Extremity atherosclerosis with intermittent claudication ([address] 10/13/2015    GERD (gastroesophageal reflux disease)       tums    Heart failure (HCC)      Hx of CABG 5/27/2011     LIMA and 3 SVG. 2 OMs/PLV and one acute marginal of RCA     Hypertension      LVH (left ventricular hypertrophy) 10/13/2015    MI, old      Peripheral arterial disease ([address] 6/29/2012    Pleural effusion 10/13/2015    Psychiatric disorder      S/P coronary artery stent placement 5/27/2011     2.0 x 15mm Xience to OM2-5/27/11     S/P peripheral artery angioplasty 6/29/2012    Severe obesity (BMI 35.0-39. 9) 7/19/2018    Sleep apnea       does not wear cpap- weight loss    Tobacco use disorder 10/13/2015    Unspecified adverse effect of anesthesia       slow to wake up         Past Surgical History         Past Surgical History:   Procedure Laterality Date    APPENDECTOMY        BREAST SURGERY         biopsy left    CHOLECYSTECTOMY         08/01/09    GASTRIC BYPASS SURGERY         gastric bypass    HYSTERECTOMY (CERVIX STATUS UNKNOWN)          partial hyst.,bilat oopherectomy    OVARY REMOVAL        MN UNLISTED PROCEDURE CARDIAC SURGERY         4 vessel CABG - stent x3    UROLOGICAL SURGERY         bladder tack    VASCULAR SURGERY Bilateral 2018     iliac angio and stent         Family History         Family History   Problem Relation Age of Onset    Heart Attack Father      Heart Disease Mother      Colon Cancer Neg Hx      Heart Disease Brother           Social History            Tobacco Use    Smoking status: Former       Types: Cigarettes       Quit date: 2006       Years since quittin.6    Smokeless tobacco: Never    Tobacco comments:       Quit smokin/06   Substance Use Topics    Alcohol use: Yes       Alcohol/week: 0.8 standard drinks         ROS:     Review of Systems   Constitutional: Negative for fever. HENT:  Negative for stridor. Eyes:  Negative for pain. Cardiovascular:  Negative for chest pain. Respiratory:  Negative for cough. Endocrine: Negative for cold intolerance. Skin:  Negative for nail changes. Musculoskeletal:  Negative for arthritis. Gastrointestinal:  Negative for abdominal pain. Genitourinary:  Negative for dysuria. Neurological:  Negative for aphonia. Psychiatric/Behavioral:  Negative for altered mental status. Allergic/Immunologic: Negative for hives. PHYSICAL EXAM:     /80   Pulse 64   Ht 5' (1.524 m)   Wt 171 lb (77.6 kg)   BMI 33.40 kg/m²             Wt Readings from Last 3 Encounters:   23 171 lb (77.6 kg)   22 170 lb (77.1 kg)   10/14/22 168 lb 6.4 oz (76.4 kg)          BP Readings from Last 3 Encounters:   23 128/80   22 128/78   10/14/22 132/78            Physical Exam  Vitals reviewed. HENT:      Head: Normocephalic. Right Ear: External ear normal.      Left Ear: External ear normal.      Nose: Nose normal.   Eyes:      General: No scleral icterus. Pulmonary:      Effort: Pulmonary effort is normal.   Abdominal:      General: There is no distension. Musculoskeletal:      Cervical back: Neck supple. Skin:     General: Skin is warm. Neurological:      Mental Status: She is alert. Mental status is at baseline. Medical problems and test results were reviewed with the patient today. Recent Results   No results found for this or any previous visit (from the past 672 hour(s)). No results found for: CHOL, CHOLPOCT, CHOLX, CHLST, CHOLV, HDL, HDLPOC, HDLC, LDL, LDLC, VLDLC, VLDL, TGLX, TRIGL     No results found for any visits on 01/11/23. Wilber Cornell was seen today for bradycardia. Diagnoses and all orders for this visit:     Essential (primary) hypertension     Bradycardia  -     Case Request Cardiac Cath Lab  -     CBC with Auto Differential; Future  -     Basic Metabolic Panel; Future     Hyperlipidemia, unspecified hyperlipidemia type     Tremor  -     [facility], Neurology, HOSPITAL [address]     Return in about 4 months (around 5/11/2023). Valente Jeans, MD  1/11/2023  8:05 AM                         Office Visit on 1/11/2023    Office Visit on 1/11/2023      Detailed Report    Note shared with patient  Additional Documentation    Vitals:  /80  Pulse 64  Ht 5' (1.524 m)  Wt 171 lb (77.6 kg)  BMI 33.40 kg/m²  BSA 1.81 m²  Pain Sc   0 - No pain    More Vitals   Flowsheets:  Calorie Assessment     Encounter Info:  Billing Info,  History,  Allergies,  Detailed Report       Progress Notes Info    Author Note Status Last Update User Last Update Date/Time   Valente Jeans, MD Signed Valente Jeans, MD 1/11/2023  8:05 AM     Chart Review Routing History    No routing history on file.   Linked Episodes    INSERT PPM DUAL Noted 1/11/2023    Orders Placed      Basic Metabolic Panel    CBC with Auto Differential    [facility], Neurology, [address] Open    Case Request Cardiac Cath Lab     All Encounter Results     Medication Changes      None     Medication List     Visit Diagnoses      Essential (primary) hypertension    Bradycardia    Hyperlipidemia, unspecified hyperlipidemia type    Tremor     Problem List

## 2023-01-26 NOTE — PROGRESS NOTES
TRANSFER - OUT REPORT:    Verbal report given to SAW Payne on Anum Finn being transferred to [facility] for routine post-op       Report consisted of patient’s Situation, Background, Assessment and Recommendations (SBAR).     Information from the following report(s) SBAR, Procedure Summary, and Recent Results was reviewed with the receiving nurse.    Opportunity for questions and clarification was provided.       L subclavian C/D/I.     BR until 1600.

## 2023-01-26 NOTE — DISCHARGE INSTRUCTIONS
Post Op Device Instructions        Incision & Dressing Care   Please keep Aquacel dressing on wound until your 2 week follow up in device clinic. The dressing promotes healing and is meant to stay on the wound. Keep incision site completely dry for 48 hours. During this time you may take a sponge bath, but no shower. After 48 hours you may shower with your back to the water letting the water run over the dressing. Do not let the water directly hit the dressing, it is water resistant no water proof. Do not use any lotions, creams, or ointments on the incision. Avoid manipulating the device or leads with your fingers. Do not massage or excessively rub the implant site. This could displace the leads      For four weeks after your implant   Do not lift anything heavier than a gallon of milk. Do not raise your elbow above the level of your shoulder on the Left shoulder implant side. Avoid excessive pushing, pulling, or twisting. Call you doctor for any of the following:   Any signs of infection, including redness, swelling or pain at the incision site, or a   temperature of 101° F or greater. If you have twitching chest muscles, hiccups that won't stop, or a swollen arm on the   side of the incision. Any feelings of light-headedness, chest pain, or shortness of breath. Please notify your doctors and dentists that you have an ICD. You should not drive until 1 week after your implant or after your first follow-up appointment, whichever comes first. At that time, you can discuss when you may return to your regular driving routine. About 1 month after implant, you will receive a card by mail from the company who manufactured your ICD. Your device was manufactured by [employer]. You should carry this card with you at all times. If you receive one shock from your device:   and you feel ok, you may call to let your physician know.    but if you are feeling poorly, please notify your doctor. You may need to be seen. If you receive more than one shock in a 24 hour period, call your physician to schedule a visit or report to the emergency room. Please call the [facility] [address] at  [phone] if you have questions or concerns about your device. Please call the hospital if it is after 5 pm or the weekend please page the on call cardiologist at [facility] at [address] will need to have your device checked 1- 2 weeks after the procedure and should have an appointment with the device clinic. If you do not hear from them call the device clinic. Sedation for a Medical Procedure: Care Instructions     You were given a sedative medication during your visit. While many of the effects will have worn   off before you leave; you may continue to feel some effects for several hours. Common side effects from sedation include:  Feeling sleepy. (Your doctors and nurses will make sure you are not too sleepy to go home.)  Nausea and vomiting. This usually does not last long. Feeling tired. How can you care for yourself at home? Activity    Don't do anything for 24 hours that requires attention to detail. It takes time for the medicine effects to completely wear off. Do not make important legal decisions for 24 hours. Do not sign any legal documents for 24 hours. Do not drink alcohol today     For your safety, you should not drive or operate heavy machinery for the remainder of the day     Rest when you feel tired. Getting enough sleep will help you recover. Diet    You can eat your normal diet, unless your doctor gives you other instructions. If your stomach is upset, try clear liquids and bland, low-fat foods like plain toast or rice. Drink plenty of fluids (unless your doctor tells you not to). Don't drink alcohol for 24 hours. Medicines    Be safe with medicines. Read and follow all instructions on the label.   If the doctor gave you a prescription medicine for pain, take it as prescribed. If you are not taking a prescription pain medicine, ask your doctor if you can take an over-the-counter medicine. If you think your pain medicine is making you sick to your stomach: Take your medicine after meals (unless your doctor has told you not to). Ask your doctor for a different pain medicine. I have read the above instructions and have had the opportunity to ask questions.       Patient: ________________________   Date: _____________    Witness: _______________________   Date: _____________

## 2023-01-26 NOTE — PROGRESS NOTES
Report received from Jerry, Cath Lab RN. Procedural finding communicated. Intra procedural medication administration reviewed. Progression of care discussed. Patient received into CPRU room 4, Post PPM insertion with Dr. Mark Andrew. Access site without bleeding or swelling. Patient instructed to limit movement of L uppper extremity. Routine post procedural vital signs & site assessment initiated.

## 2023-01-26 NOTE — PROGRESS NOTES
TRANSFER - OUT REPORT:    Verbal report given to RN on Sana Cleveland  being transferred to [facility] for routine progression of patient care       Report consisted of patient's Situation, Background, Assessment and   Recommendations(SBAR). Information from the following report(s) Nurse Handoff Report was reviewed with the receiving nurse. Opportunity for questions and clarification was provided.       Patient transported with:  Registered Nurse    [facility]  DDDR   L chest, sutures, dermabond, aquacell    4 mg Versed  50 mcg Fentanyl

## 2023-01-26 NOTE — Clinical Note
Prepped: left chest. Site was prepped. Prepped with: ChloraPrep. Patient was draped after wet prep solution dried.

## 2023-01-26 NOTE — PROGRESS NOTES
TRANSFER - IN REPORT:    Verbal report received from [facility] on Lisa Ochoa being received from Cath Lab () for routine progression of care. Report consisted of patients Situation, Background, Assessment and Recommendations(SBAR). Information from the following report(s) SBAR, Kardex, Procedure Summary, and Cardiac Rhythm sinus rhythm  was reviewed. Opportunity for questions and clarification was provided. Assessment completed upon patients arrival to unit and care assumed. Patient received to room 325. Patient connected to monitor and assessment completed. Plan of care reviewed. Patient oriented to room and call light. Patient aware to use call light to communicate any chest pain or needs. Admission skin assessment completed with second RN and reveals the following: sacrum and heels intact and free of redness. Pt presents to unit with scattered bruising and scaring. Pt also has left subclavian pacer site C/D/I with hematoma present. Sling and continuous blood pressures in place. Dual skin assessment completed with Earlene Pierre RN.

## 2023-01-26 NOTE — CARE COORDINATION
Patient admitted in OBS for scheduled PPM. Patient underwent the procedure 1/26. CM reviewed the medical record for discharge needs. CM will remain available in the post op period to assist with new discharge needs. 01/26/23 6803   Service Assessment   History Provided By Medical Record   [address] is: Legal Next Aliya Tran   PCP Verified by CM Yes   Last Visit to PCP Within last 3 months   Financial Resources Medicare   Social/Functional History   Receives Help From [facility]   Discharge Planning   Potential Assistance Needed N/A   DME Ordered?  No   Type of Home Care Services None   Services At/After Discharge   Transition of Care Consult (CM Consult) Discharge Planning   Services At/After Discharge None   Mode of Transport at Discharge Self  (Car)

## 2023-01-27 VITALS
HEIGHT: 60 IN | BODY MASS INDEX: 33.67 KG/M2 | DIASTOLIC BLOOD PRESSURE: 69 MMHG | WEIGHT: 171.52 LBS | RESPIRATION RATE: 20 BRPM | SYSTOLIC BLOOD PRESSURE: 136 MMHG | HEART RATE: 68 BPM | TEMPERATURE: 97.5 F | OXYGEN SATURATION: 89 %

## 2023-01-27 LAB — ECHO BSA: 1.81 M2

## 2023-01-27 PROCEDURE — 99238 HOSP IP/OBS DSCHRG MGMT 30/<: CPT | Performed by: INTERNAL MEDICINE

## 2023-01-27 PROCEDURE — G0378 HOSPITAL OBSERVATION PER HR: HCPCS

## 2023-01-27 PROCEDURE — 6370000000 HC RX 637 (ALT 250 FOR IP): Performed by: NURSE PRACTITIONER

## 2023-01-27 RX ADMIN — VITAMIN D, TAB 1000IU (100/BT) 1000 UNITS: 25 TAB at 09:29

## 2023-01-27 RX ADMIN — ASPIRIN 81 MG: 81 TABLET ORAL at 09:29

## 2023-01-27 RX ADMIN — BUPROPION HYDROCHLORIDE 300 MG: 150 TABLET, EXTENDED RELEASE ORAL at 09:29

## 2023-01-27 RX ADMIN — ACETAMINOPHEN 1000 MG: 500 TABLET, FILM COATED ORAL at 03:36

## 2023-01-27 ASSESSMENT — PAIN DESCRIPTION - ORIENTATION: ORIENTATION: LEFT

## 2023-01-27 ASSESSMENT — PAIN DESCRIPTION - DESCRIPTORS: DESCRIPTORS: ACHING

## 2023-01-27 ASSESSMENT — PAIN DESCRIPTION - LOCATION: LOCATION: INCISION

## 2023-01-27 ASSESSMENT — PAIN SCALES - GENERAL
PAINLEVEL_OUTOF10: 4
PAINLEVEL_OUTOF10: 0

## 2023-01-27 NOTE — CARE COORDINATION
CM met with patient with discharge order placed. Patient is agreeable to discharge. No CM needs identified. Daughter to transport patient home. 01/27/23 9368   Service Assessment   Patient Orientation Alert and Oriented   Cognition Alert   History Provided By Patient   Primary [address]   Patient's Healthcare Decision Maker is: Legal Next of Kin   PCP Verified by CM Yes   Last Visit to PCP Within last 3 months   Prior Functional Level Independent in ADLs/IADLs   Current Functional Level Independent in ADLs/IADLs   Can patient return to prior living arrangement Yes   Ability to make needs known: Good   Family able to assist with home care needs: Yes   Would you like for me to discuss the discharge plan with any other family members/significant others, and if so, who? No   Financial Resources Medicare   Social/Functional History   Receives Help From Family   ADL Assistance Independent   Ambulation Assistance Independent   Transfer Assistance Independent   Active  Yes   Mode of Transportation Car   Discharge Planning   Current Services Prior To Admission None   Potential Assistance Needed N/A   DME Ordered? No   Potential Assistance Purchasing Medications No   Type of Home Care Services None   Services At/After Discharge   Transition of Care Consult (CM Consult) Discharge [address] Discharge None   [place] Resource Information Provided?  No   Mode of Transport at Discharge   (Car)   Confirm Follow Up Transport Family   Condition of Participation: Discharge Planning   The Plan for Transition of Care is related to the following treatment goals: Home with family assistance

## 2023-01-27 NOTE — DISCHARGE SUMMARY
[address] [place] [address] Cardiology Discharge Summary     Patient ID:  Juliane Allison  [id number]  81 y.o.  1950    Admit date: 1/26/2023    Discharge date:  1/27/2023    Admitting Physician: Aaron Cotton MD     Discharge Physician: Dr. Constance Harp    Admission Diagnoses: Bradycardia [R00.1]    Discharge Diagnoses:   Patient Active Problem List    Diagnosis    Bradycardia, symptomatic        Cardiology Procedures this admission:   pacemaker implantation, CXR  Consults: none    Hospital Course: Patient was seen at the office of [address] [place] [address] Cardiology by Dr. Daniel Correa for management of symptomatic bradycardia and was scheduled for an AM Admission PM implantation at [facility] on 1/26/23. Patient was taken to the cath lab and underwent successful implantation of [facility] pacemaker implantation by Dr. Constance Harp. Patient tolerated the procedure well and was taken to the telemetry floor for recovery. Follow up chest xray showed no pneumothorax. The following morning patient was up feeling well without any complaints of chest pain, shortness of breath, or palpitations. Device check was normal w/o changes needed. Patient's left subclavian cath site was clean, dry and intact without hematoma. Patient was seen and examined by Dr. Constance Harp and determined stable and ready for discharge. Patient was instructed on the importance of medication compliance and outpatient follow up. Patient has been scheduled for follow-up with [address] [place] [address] Cardiology -- device clinic on 2/9 at 3 PM.    DISPOSITION: Patient has been instructed to keep affected arm below shoulder level for the next 4 weeks or until cleared by doctor. The arm sling should be worn while sleeping. The dressing will be removed at follow-up. The incision site must be kept clean and dry. The patient may shower in a few days. Lotions, powders, or creams should be avoided as these can increase the risk of infection.   The affected arm should not be used for any pushing, pulling, or lifting until cleared by doctor. Driving is prohibited until cleared by doctor in a follow up appointment. Any signs of infection including increased redness, suspicious drainage, or unexplained fever should be reported to the doctor immediately by calling [phone]. Discharge Exam:  Patient has been seen by Dr. Cheli Felder: see his progress note for exam details.   BP (!) 112/58   Pulse 80   Temp 98.1 °F (36.7 °C) (Oral)   Resp 20   Ht 5' (1.524 m)   Wt 171 lb (77.6 kg)   SpO2 91%   BMI 33.40 kg/m²       Recent Results (from the past 24 hour(s))   Basic Metabolic Panel    Collection Time: 01/26/23  8:15 AM   Result Value Ref Range    Sodium 138 133 - 143 mmol/L    Potassium 4.2 3.5 - 5.1 mmol/L    Chloride 104 101 - 110 mmol/L    CO2 26 21 - 32 mmol/L    Anion Gap 8 2 - 11 mmol/L    Glucose 93 65 - 100 mg/dL    BUN 12 8 - 23 MG/DL    Creatinine 1.10 (H) 0.6 - 1.0 MG/DL    Est, Glom Filt Rate 54 (L) >60 ml/min/1.73m2    Calcium 9.2 8.3 - 10.4 MG/DL   CBC with Auto Differential    Collection Time: 01/26/23  8:15 AM   Result Value Ref Range    WBC 6.7 4.3 - 11.1 K/uL    RBC 5.02 4.05 - 5.2 M/uL    Hemoglobin 16.0 (H) 11.7 - 15.4 g/dL    Hematocrit 46.0 35.8 - 46.3 %    MCV 91.6 82 - 102 FL    MCH 31.9 26.1 - 32.9 PG    MCHC 34.8 31.4 - 35.0 g/dL    RDW 12.5 11.9 - 14.6 %    Platelets 962 763 - 003 K/uL    MPV 8.7 (L) 9.4 - 12.3 FL    nRBC 0.00 0.0 - 0.2 K/uL    Differential Type AUTOMATED      Seg Neutrophils 52 43 - 78 %    Lymphocytes 32 13 - 44 %    Monocytes 11 4.0 - 12.0 %    Eosinophils % 3 0.5 - 7.8 %    Basophils 1 0.0 - 2.0 %    Immature Granulocytes 1 0.0 - 5.0 %    Segs Absolute 3.5 1.7 - 8.2 K/UL    Absolute Lymph # 2.2 0.5 - 4.6 K/UL    Absolute Mono # 0.7 0.1 - 1.3 K/UL    Absolute Eos # 0.2 0.0 - 0.8 K/UL    Basophils Absolute 0.1 0.0 - 0.2 K/UL    Absolute Immature Granulocyte 0.0 0.0 - 0.5 K/UL   Magnesium    Collection Time: 01/26/23  8:15 AM   Result Value Ref Range    Magnesium 2.1 1.8 - 2.4 mg/dL Protime-INR    Collection Time: 01/26/23  8:15 AM   Result Value Ref Range    Protime 14.2 12.6 - 14.3 sec    INR 1.1     EKG 12 Lead    Collection Time: 01/26/23  8:31 AM   Result Value Ref Range    Ventricular Rate 60 BPM    Atrial Rate 60 BPM    P-R Interval 160 ms    QRS Duration 82 ms    Q-T Interval 482 ms    QTc Calculation (Bazett) 482 ms    P Axis 46 degrees    R Axis 49 degrees    T Axis 30 degrees    Diagnosis Normal sinus rhythm    Electrophysiology procedure    Collection Time: 01/26/23  9:54 AM   Result Value Ref Range    Body Surface Area 1.81 m2     Patient Instructions:   Current Discharge Medication List        CONTINUE these medications which have NOT CHANGED    Details   rivaroxaban (XARELTO) 2.5 MG TABS tablet Take 1 tablet by mouth daily (with breakfast)  Qty: 90 tablet, Refills: 3      buPROPion (WELLBUTRIN XL) 300 MG extended release tablet TAKE 1 TABLET BY MOUTH EVERY DAY IN THE MORNING FOR 90 DAYS      escitalopram (LEXAPRO) 20 MG tablet TAKE 1 TABLET BY MOUTH EVERY DAY      acetaminophen (TYLENOL) 500 MG tablet Take 1,000 mg by mouth every 6 hours as needed for Pain. Vitamin D, Cholecalciferol, 25 MCG (1000 UT) TABS Take 1 tablet by mouth daily. Magnesium 100 MG TABS Take 1 tablet by mouth daily. Omega-3 Fatty Acids (FISH OIL) 1200 MG CAPS Take 1 capsule by mouth daily.       aspirin 81 MG EC tablet Take 1 tablet by mouth daily  Qty: 30 tablet, Refills: 0      Biotin 10 MG CAPS Take 1 capsule by mouth daily      potassium gluconate 550 mg tablet Take 595 mg by mouth daily           STOP taking these medications       furosemide (LASIX) 20 MG tablet Comments:   Reason for Stopping: NOT taking at home            Dr. Raheel Mac, PA-C

## 2023-02-27 NOTE — Clinical Note
Sent   TRANSFER - OUT REPORT:     Verbal report given to: CPRU RN. Report consisted of patient's Situation, Background, Assessment and   Recommendations(SBAR). Opportunity for questions and clarification was provided. Patient transported with a Registered Nurse. Patient transported to: recovery.

## 2023-03-16 DIAGNOSIS — R00.1 BRADYCARDIA: Primary | ICD-10-CM

## 2023-03-16 DIAGNOSIS — Z95.0 PACEMAKER: ICD-10-CM

## 2023-04-21 ENCOUNTER — OFFICE VISIT (OUTPATIENT)
Dept: CARDIOLOGY CLINIC | Age: 73
End: 2023-04-21

## 2023-04-21 VITALS
SYSTOLIC BLOOD PRESSURE: 108 MMHG | BODY MASS INDEX: 34.02 KG/M2 | HEART RATE: 68 BPM | HEIGHT: 60 IN | WEIGHT: 173.3 LBS | DIASTOLIC BLOOD PRESSURE: 62 MMHG

## 2023-04-21 DIAGNOSIS — R06.09 DOE (DYSPNEA ON EXERTION): ICD-10-CM

## 2023-04-21 DIAGNOSIS — I73.9 PERIPHERAL VASCULAR DISEASE, UNSPECIFIED (HCC): ICD-10-CM

## 2023-04-21 DIAGNOSIS — E78.5 HYPERLIPIDEMIA, UNSPECIFIED HYPERLIPIDEMIA TYPE: ICD-10-CM

## 2023-04-21 DIAGNOSIS — I34.0 MITRAL VALVE INSUFFICIENCY, UNSPECIFIED ETIOLOGY: ICD-10-CM

## 2023-04-21 DIAGNOSIS — I10 ESSENTIAL (PRIMARY) HYPERTENSION: Primary | ICD-10-CM

## 2023-04-21 LAB
CHOLEST SERPL-MCNC: 307 MG/DL
HDLC SERPL-MCNC: 56 MG/DL (ref 40–60)
HDLC SERPL: 5.5
LDLC SERPL CALC-MCNC: 209.6 MG/DL
TRIGL SERPL-MCNC: 207 MG/DL (ref 35–150)
VLDLC SERPL CALC-MCNC: 41.4 MG/DL (ref 6–23)

## 2023-04-21 ASSESSMENT — ENCOUNTER SYMPTOMS
ABDOMINAL PAIN: 0
SHORTNESS OF BREATH: 1
STRIDOR: 0
COUGH: 0
NAIL CHANGES: 0
EYE PAIN: 0
APHONIA: 0

## 2023-04-21 NOTE — PROGRESS NOTES
atherosclerosis with intermittent claudication ([facility] [address] 10/13/2015    GERD (gastroesophageal reflux disease)     tums    Heart failure (HCC)     Hx of CABG 2011    LIMA and 3 SVG. 2 OMs/PLV and one acute marginal of RCA     Hypertension     LVH (left ventricular hypertrophy) 10/13/2015    MI, old     Peripheral arterial disease ([facility] [address] 2012    Pleural effusion 10/13/2015    Psychiatric disorder     S/P coronary artery stent placement 2011    2.0 x 15mm Xience to OM2-11     S/P peripheral artery angioplasty 2012    Severe obesity (BMI 35.0-39. 9) 2018    Sleep apnea     does not wear cpap- weight loss    Tobacco use disorder 10/13/2015    Unspecified adverse effect of anesthesia     slow to wake up     Past Surgical History:   Procedure Laterality Date    APPENDECTOMY      BREAST SURGERY      biopsy left    CHOLECYSTECTOMY      09    EP DEVICE PROCEDURE N/A 2023    INSERT PPM DUAL performed by Priya Orourke MD at [facility] CARDIAC CATH LAB    GASTRIC BYPASS SURGERY      gastric bypass    HYSTERECTOMY (CERVIX STATUS UNKNOWN)       partial hyst.,bilat oopherectomy    OVARY REMOVAL      [place] UNLISTED PROCEDURE CARDIAC SURGERY      4 vessel CABG - stent x3    UROLOGICAL SURGERY      bladder tack    VASCULAR SURGERY Bilateral 2018    iliac angio and stent     Family History   Problem Relation Age of Onset    Heart Attack Father     Heart Disease Mother     Colon Cancer Neg Hx     Heart Disease Brother       Social History     Tobacco Use    Smoking status: Former     Types: Cigarettes     Quit date: 2006     Years since quittin.9    Smokeless tobacco: Never    Tobacco comments:     Quit smokin/06   Substance Use Topics    Alcohol use: Yes     Alcohol/week: 0.8 standard drinks       ROS:    Review of Systems   Constitutional: Negative for fever. HENT:  Negative for stridor. Eyes:  Negative for pain. Cardiovascular:  Negative for chest pain.    Respiratory:  Positive

## 2023-05-16 ENCOUNTER — OFFICE VISIT (OUTPATIENT)
Dept: NEUROLOGY | Age: 73
End: 2023-05-16
Payer: MEDICARE

## 2023-05-16 VITALS — HEART RATE: 72 BPM | SYSTOLIC BLOOD PRESSURE: 134 MMHG | DIASTOLIC BLOOD PRESSURE: 82 MMHG | OXYGEN SATURATION: 98 %

## 2023-05-16 DIAGNOSIS — R26.89 ABNORMALITY OF GAIT DUE TO IMPAIRMENT OF BALANCE: ICD-10-CM

## 2023-05-16 DIAGNOSIS — G25.0 ESSENTIAL TREMOR: Primary | ICD-10-CM

## 2023-05-16 DIAGNOSIS — G31.84 MILD COGNITIVE IMPAIRMENT: ICD-10-CM

## 2023-05-16 PROCEDURE — G8400 PT W/DXA NO RESULTS DOC: HCPCS | Performed by: PSYCHIATRY & NEUROLOGY

## 2023-05-16 PROCEDURE — 3078F DIAST BP <80 MM HG: CPT | Performed by: PSYCHIATRY & NEUROLOGY

## 2023-05-16 PROCEDURE — G8427 DOCREV CUR MEDS BY ELIG CLIN: HCPCS | Performed by: PSYCHIATRY & NEUROLOGY

## 2023-05-16 PROCEDURE — 1036F TOBACCO NON-USER: CPT | Performed by: PSYCHIATRY & NEUROLOGY

## 2023-05-16 PROCEDURE — 3074F SYST BP LT 130 MM HG: CPT | Performed by: PSYCHIATRY & NEUROLOGY

## 2023-05-16 PROCEDURE — 1123F ACP DISCUSS/DSCN MKR DOCD: CPT | Performed by: PSYCHIATRY & NEUROLOGY

## 2023-05-16 PROCEDURE — 3017F COLORECTAL CA SCREEN DOC REV: CPT | Performed by: PSYCHIATRY & NEUROLOGY

## 2023-05-16 PROCEDURE — G8417 CALC BMI ABV UP PARAM F/U: HCPCS | Performed by: PSYCHIATRY & NEUROLOGY

## 2023-05-16 PROCEDURE — 99205 OFFICE O/P NEW HI 60 MIN: CPT | Performed by: PSYCHIATRY & NEUROLOGY

## 2023-05-16 PROCEDURE — 1090F PRES/ABSN URINE INCON ASSESS: CPT | Performed by: PSYCHIATRY & NEUROLOGY

## 2023-05-16 ASSESSMENT — ENCOUNTER SYMPTOMS
ABDOMINAL PAIN: 0
COUGH: 0
VOICE CHANGE: 1

## 2023-05-16 NOTE — PROGRESS NOTES
[facility]  [address], [address] [facility], [address] [place] [address]  Phone: [phone] Fax [phone]  Owen Koch MD      Patient: Chandler Chua  Provider: Owen Koch MD    CC:   Chief Complaint   Patient presents with    New Patient    Tremors     Referring Provider:    History of Present Illness:     Chandler Chua is a 72 y.o. RH female who presents for evaluation of tremor. She is accompanied by her daughter and granddaughter. Patient presents for further evaluation of tremor. Note that she has seen Dr. Vahid Merritt in the past and was last seen in August 2019. These notes have been reviewed. According to chart review she has a pleasant right-handed female with history of benign essential tremors. There has never been any previous concern for parkinsonism. She has never had any formal treatment for tremor though she was previously on atenolol for blood pressure. She is no longer on beta-blockers due to more recent history of symptomatic bradycardia resulting in fatigue and increasing her risk of falls. She recently had a pacemaker implantation in January 2023. Followed by Levine, Susan. \[facility].\"" cardiology. She remains on Xarelto for anticoagulation. She reports a tremor of the bilateral hands which can interrupt tasks requiring dexterity such as writing, holding a drink steady, and eating with utensils. Her handwriting is very shaky. She essentially has to hold cups with two hands. Her family does note an intermittent tremor of her head and her voice at times although these go less noticed by the patient. She reports her mother had a similar tremor of the hands but went undiagnosed. No other family history. She currently lives with her daughter who helps care for her. There has been concern about frequent short-term memory lapses and forgetfulness throughout the day. Despite the symptoms she is managing her medications on her own without any difficulty.   Her driving has been

## 2023-05-18 RX ORDER — ATORVASTATIN CALCIUM 80 MG/1
80 TABLET, FILM COATED ORAL DAILY
Qty: 90 TABLET | Refills: 3 | Status: SHIPPED | OUTPATIENT
Start: 2023-05-18

## 2023-05-18 NOTE — TELEPHONE ENCOUNTER
Requested Prescriptions     Pending Prescriptions Disp Refills    atorvastatin (LIPITOR) 80 MG tablet 90 tablet 3     Sig: Take 1 tablet by mouth daily

## 2023-05-19 ENCOUNTER — CLINICAL DOCUMENTATION (OUTPATIENT)
Dept: NEUROLOGY | Age: 73
End: 2023-05-19

## 2023-05-19 DIAGNOSIS — G25.0 ESSENTIAL TREMOR: Primary | ICD-10-CM

## 2023-05-19 RX ORDER — PROPRANOLOL HYDROCHLORIDE 20 MG/1
20 TABLET ORAL 2 TIMES DAILY
Qty: 60 TABLET | Refills: 5 | Status: SHIPPED | OUTPATIENT
Start: 2023-05-19

## 2023-05-19 NOTE — PROGRESS NOTES
Case was discussed with Dr. Norman Wright and he is okay with a low-dose beta-blocker for her tremor. We will send prescription for propranolol 20 mg twice a day. Potential side effects include lightheadedness or fatigue.

## 2023-05-23 ENCOUNTER — OFFICE VISIT (OUTPATIENT)
Dept: VASCULAR SURGERY | Age: 73
End: 2023-05-23
Payer: MEDICARE

## 2023-05-23 VITALS
BODY MASS INDEX: 34.55 KG/M2 | HEART RATE: 61 BPM | DIASTOLIC BLOOD PRESSURE: 69 MMHG | OXYGEN SATURATION: 99 % | SYSTOLIC BLOOD PRESSURE: 143 MMHG | HEIGHT: 60 IN | WEIGHT: 176 LBS

## 2023-05-23 DIAGNOSIS — I73.9 PAD (PERIPHERAL ARTERY DISEASE) (HCC): Primary | ICD-10-CM

## 2023-05-23 PROCEDURE — 1036F TOBACCO NON-USER: CPT | Performed by: NURSE PRACTITIONER

## 2023-05-23 PROCEDURE — 1090F PRES/ABSN URINE INCON ASSESS: CPT | Performed by: NURSE PRACTITIONER

## 2023-05-23 PROCEDURE — 3077F SYST BP >= 140 MM HG: CPT | Performed by: NURSE PRACTITIONER

## 2023-05-23 PROCEDURE — G8427 DOCREV CUR MEDS BY ELIG CLIN: HCPCS | Performed by: NURSE PRACTITIONER

## 2023-05-23 PROCEDURE — 99213 OFFICE O/P EST LOW 20 MIN: CPT | Performed by: NURSE PRACTITIONER

## 2023-05-23 PROCEDURE — G8400 PT W/DXA NO RESULTS DOC: HCPCS | Performed by: NURSE PRACTITIONER

## 2023-05-23 PROCEDURE — 3017F COLORECTAL CA SCREEN DOC REV: CPT | Performed by: NURSE PRACTITIONER

## 2023-05-23 PROCEDURE — 3078F DIAST BP <80 MM HG: CPT | Performed by: NURSE PRACTITIONER

## 2023-05-23 PROCEDURE — G8417 CALC BMI ABV UP PARAM F/U: HCPCS | Performed by: NURSE PRACTITIONER

## 2023-05-23 PROCEDURE — 1123F ACP DISCUSS/DSCN MKR DOCD: CPT | Performed by: NURSE PRACTITIONER

## 2023-05-25 ENCOUNTER — CLINICAL DOCUMENTATION (OUTPATIENT)
Dept: NEUROLOGY | Age: 73
End: 2023-05-25

## 2023-05-25 NOTE — PROGRESS NOTES
Called patient to inform of new medication and message from Dr. Nilson Benson, no answer,LVM to call us back.

## 2023-06-22 DIAGNOSIS — Z95.0 PACEMAKER: ICD-10-CM

## 2023-06-22 DIAGNOSIS — R00.1 BRADYCARDIA: Primary | ICD-10-CM

## 2023-06-29 ENCOUNTER — CLINICAL DOCUMENTATION (OUTPATIENT)
Age: 73
End: 2023-06-29

## 2023-07-19 ENCOUNTER — TELEPHONE (OUTPATIENT)
Age: 73
End: 2023-07-19

## 2023-07-19 NOTE — TELEPHONE ENCOUNTER
Cardiac Clearance        Physician or Practice Requesting:Gastroenterology  : Dr Lola Dias Phone Number: [phone]  Fax Number: [phone]  Date of Surgery/Procedure: 08/07/23  Type of Surgery or Procedure: Egd/colo  Type of Anesthesia: ?   Type of Clearance Requested: Cardiac and med  Medication to Hold:Xarelto  Days to Hold: 48 hours

## 2023-07-19 NOTE — TELEPHONE ENCOUNTER
When interruption of novel anticoagulation therapy is deemed necessary, the agent is usually stopped 2 days before the procedure ( and is restarted postoperatively as soon as bleeding risk allows, typically 12 to 24 hours after surgery. As always, temporary discontinuation of anticoagulation therapy does place patient at minimally increased risk of embolic events. A risk-benefit decision should be made by the physician performing procedure when deciding to hold anticoagulation.

## 2023-07-20 DIAGNOSIS — R00.1 BRADYCARDIA: ICD-10-CM

## 2023-07-20 DIAGNOSIS — Z95.0 PACEMAKER: ICD-10-CM

## 2023-07-20 DIAGNOSIS — R00.1 BRADYCARDIA: Primary | ICD-10-CM

## 2023-07-26 ENCOUNTER — OFFICE VISIT (OUTPATIENT)
Dept: NEUROLOGY | Age: 73
End: 2023-07-26
Payer: MEDICARE

## 2023-07-26 VITALS
BODY MASS INDEX: 34.76 KG/M2 | WEIGHT: 178 LBS | OXYGEN SATURATION: 97 % | HEART RATE: 62 BPM | SYSTOLIC BLOOD PRESSURE: 132 MMHG | DIASTOLIC BLOOD PRESSURE: 84 MMHG

## 2023-07-26 DIAGNOSIS — G31.84 MILD COGNITIVE IMPAIRMENT: ICD-10-CM

## 2023-07-26 DIAGNOSIS — R26.89 ABNORMALITY OF GAIT DUE TO IMPAIRMENT OF BALANCE: ICD-10-CM

## 2023-07-26 DIAGNOSIS — G25.0 ESSENTIAL TREMOR: Primary | Chronic | ICD-10-CM

## 2023-07-26 DIAGNOSIS — Z91.81 HISTORY OF FALL: ICD-10-CM

## 2023-07-26 PROCEDURE — 3017F COLORECTAL CA SCREEN DOC REV: CPT

## 2023-07-26 PROCEDURE — G8427 DOCREV CUR MEDS BY ELIG CLIN: HCPCS

## 2023-07-26 PROCEDURE — G8400 PT W/DXA NO RESULTS DOC: HCPCS

## 2023-07-26 PROCEDURE — 1090F PRES/ABSN URINE INCON ASSESS: CPT

## 2023-07-26 PROCEDURE — 1123F ACP DISCUSS/DSCN MKR DOCD: CPT

## 2023-07-26 PROCEDURE — G8417 CALC BMI ABV UP PARAM F/U: HCPCS

## 2023-07-26 PROCEDURE — 1036F TOBACCO NON-USER: CPT

## 2023-07-26 PROCEDURE — 3078F DIAST BP <80 MM HG: CPT

## 2023-07-26 PROCEDURE — 3074F SYST BP LT 130 MM HG: CPT

## 2023-07-26 PROCEDURE — 99215 OFFICE O/P EST HI 40 MIN: CPT

## 2023-07-26 ASSESSMENT — ENCOUNTER SYMPTOMS
DIARRHEA: 1
CONSTIPATION: 1
TROUBLE SWALLOWING: 0
COUGH: 0
VOICE CHANGE: 1
ABDOMINAL PAIN: 0

## 2023-07-26 NOTE — PROGRESS NOTES
time.    Mild cognitive impairment: Appears to be relatively mild at the current time. Will continue to monitor for worsening deficits. Brain imaging could be considered in the future. Gait and balance impairment: Would like a referral to outpatient physical therapy [facility] clinic (off of [address]) for further strengthening and gait/balance training. Counseled patient on the importance of consistency pertaining to the following: daily routine, sleep hygiene, adequate hydration, minimal caffeine intake, physical activity/aerobic exercise, healthy coping tools/stress management, and adherence to medication regimen. Patient was encouraged to contact the office with any questions or concerns. All questions were answered to the best of my ability. Follow up with Dr. Milly Chávez in December 2023 as scheduled. Signature: LITA Smith      Date:  8/1/2023    [facility]   [id number] [place] [address], [address] [place] [address]  Ph: [phone]  Fax: [phone]         I have personally interviewed and examined Mrs. Shlomo Carlos and I have personally reviewed all relevant records including labs and imaging as noted above. I have written all aspects of this note. More than 50% of this time was used for counseling regarding my diagnosis, prognosis, and plans for management. Total visit time: 60 minutes.

## 2023-07-31 PROCEDURE — 93294 REM INTERROG EVL PM/LDLS PM: CPT | Performed by: INTERNAL MEDICINE

## 2023-07-31 PROCEDURE — 93296 REM INTERROG EVL PM/IDS: CPT | Performed by: INTERNAL MEDICINE

## 2023-08-14 ENCOUNTER — HOSPITAL ENCOUNTER (OUTPATIENT)
Dept: PHYSICAL THERAPY | Age: 73
Setting detail: RECURRING SERIES
Discharge: HOME | End: 2023-08-17
Payer: MEDICARE

## 2023-08-14 PROCEDURE — 97161 PT EVAL LOW COMPLEX 20 MIN: CPT

## 2023-08-14 PROCEDURE — 97110 THERAPEUTIC EXERCISES: CPT

## 2023-08-14 ASSESSMENT — PAIN SCALES - GENERAL: PAINLEVEL_OUTOF10: 4

## 2023-08-14 NOTE — PLAN OF CARE
Itz Alexandra  : 1950  Primary: [facility] Medicare Complete (Medicare Managed)  Secondary:  [address] @ [address]  [address] [place] [address]  Phone: [phone]  Fax: [phone] Plan Frequency: 2x per week for 12 weeks    Plan of Care/Certification Expiration Date: 23      PT Visit Info:  Plan Frequency: 2x per week for 12 weeks  Plan of Care/Certification Expiration Date: 23  Progress Note Counter: 1      Visit Count:  1                OUTPATIENT PHYSICAL THERAPY:             OP NOTE TYPE: Initial Assessment 2023               Episode (weakness, decreased balance) Appt Desk         Treatment Diagnosis:  Difficulty in walking, Not elsewhere classified (R26.2)  Generalized Muscle Weakness (M62.81)  History of falling (Z91.81)  Medical/Referring Diagnosis:  Abnormality of gait due to impairment of balance [R26.89]  History of fall [Z91.81]  Referring Physician:  LITA Valerio MD Orders:  PT Eval and Treat   Return MD Appt:  as needed  Date of Onset:     chronic  Allergies:  Penicillins, Adhesive tape, and Codeine  Restrictions/Precautions:    Restrictions/Precautions: None        Medications Last Reviewed:  2023     SUBJECTIVE   History of Injury/Illness (Reason for Referral):  Pt reports history of falling over the past year and she is having a fear of falling as well. She is remaining mostly at home unless she needs to go to doctors appointments. She reports that she often falls backwards and has been hospitalized once after one of her falls. She is currently using cane for ambulation.   Patient Stated Goal(s):  \"to work on balance, improve strength, decrease falls\"  Initial:     4/10 Post Session:     4/10  Past Medical History/Comorbidities:   Ms. Edgardo Ch  has a past medical history of Adverse effect of anesthesia, Angina pectoris ([address]), Arthritis, CAD (coronary artery disease), Chest pain, unspecified, Coronary atherosclerosis

## 2023-08-14 NOTE — PROGRESS NOTES
Colleen Freitas  : 1950  Primary: [facility] Medicare Complete (Medicare Managed)  Secondary:  [address] @ [address] [facility] [address]  Phone: [phone]  Fax: [phone] Plan Frequency: 2x per week for 12 weeks    Plan of Care/Certification Expiration Date: 23      >PT Visit Info:  Plan Frequency: 2x per week for 12 weeks  Plan of Care/Certification Expiration Date: 23  Progress Note Counter: 1      Visit Count:  1    OUTPATIENT PHYSICAL THERAPY:OP NOTE TYPE: OP Note Type: Treatment Note 2023       Episode  }Appt Desk             Treatment Diagnosis:  Difficulty in walking, Not elsewhere classified (R26.2)  Generalized Muscle Weakness (M62.81)  History of falling (Z91.81)  Medical/Referring Diagnosis:  Abnormality of gait due to impairment of balance [R26.89]  History of fall [Z91.81]  Referring Physician:  LITA Crabtree MD Orders:  PT Eval and Treat   Date of Onset:  No data recorded   Allergies:   Penicillins, Adhesive tape, and Codeine  Restrictions/Precautions:  Restrictions/Precautions: None  No data recorded   Interventions Planned (Treatment may consist of any combination of the following):    Current Treatment Recommendations: Strengthening; Balance training; Functional mobility training; Stair training; Home exercise program; Safety education & training     >Subjective Comments:  Pt reports fear of falling again  >Initial:     4/10>Post Session:       4/10  Medications Last Reviewed:  2023  Updated Objective Findings:  See evaluation note from today  Treatment   THERAPEUTIC EXERCISE: (23 minutes):    Exercises per grid below to improve mobility and strength. Required minimal verbal cues to promote proper body mechanics. Progressed resistance, range, and repetitions as indicated.   Sit to stand x 5  Box taps x 8  LAQ x 10  Review of HEP   Standing tandem stance  Nu-step level 2 x 7 min    Treatment/Session Summary:

## 2023-08-17 ENCOUNTER — HOSPITAL ENCOUNTER (OUTPATIENT)
Dept: PHYSICAL THERAPY | Age: 73
Setting detail: RECURRING SERIES
Discharge: HOME | End: 2023-08-20
Payer: MEDICARE

## 2023-08-17 PROCEDURE — 97110 THERAPEUTIC EXERCISES: CPT

## 2023-08-17 ASSESSMENT — PAIN SCALES - GENERAL: PAINLEVEL_OUTOF10: 3

## 2023-08-17 NOTE — PROGRESS NOTES
side  Sit-stand x 15  Box taps x 30  Gait training x 50 feet  Standing bilateral hip flexion 4# x 10  LAQ bilaterally 4# x 30    Treatment/Session Summary:    >Treatment Assessment:  Pt progressed well. Very weak and had visible leg shaking at times due to weakness. She is well motivated and has reported zero falls since last visit. Communication/Consultation:  None today  Equipment provided today:  None  Recommendations/Intent for next treatment session: Next visit will focus on strengthening, balance, gait training.     >Total Treatment Billable Duration:  40 minutes  Time In: 1150  Time Out: [address] [facility], PT       Charge Capture  }Post Session Pain  PT Visit Info  [employer] Portal  MD Guidelines  Scanned Media  Benefits  MyChart    Future Appointments   Date Time Provider [address]   8/21/2023 11:45 AM Bell Music, PT [facility]   8/24/2023 11:45 AM Bell Music, PT [facility]   8/28/2023 11:45 AM Bell Music, PT [facility]   8/31/2023 11:45 AM Bell Music, PT [facility]   10/23/2023 11:15 AM Osiris Funes MD [facility]   12/1/2023 10:00 AM Fritz Ballesteros MD [facility]   5/29/2024  1:00 PM [facility] ULTRASOUND 1 [facility]   5/29/2024  2:00 PM Abner Dominguez, APRN - CNP [facility]

## 2023-08-21 ENCOUNTER — HOSPITAL ENCOUNTER (OUTPATIENT)
Dept: PHYSICAL THERAPY | Age: 73
Setting detail: RECURRING SERIES
Discharge: HOME | End: 2023-08-24
Payer: MEDICARE

## 2023-08-21 PROCEDURE — 97110 THERAPEUTIC EXERCISES: CPT

## 2023-08-21 ASSESSMENT — PAIN SCALES - GENERAL: PAINLEVEL_OUTOF10: 3

## 2023-08-21 NOTE — PROGRESS NOTES
Adele Martinez  : 1950  Primary: [facility] Medicare Complete (Medicare Managed)  Secondary:  [address] @ [address]  [address] [place] [id number]  Phone: [phone]  Fax: [phone] Plan Frequency: 2x per week for 12 weeks    Plan of Care/Certification Expiration Date: 23      >PT Visit Info:  Plan Frequency: 2x per week for 12 weeks  Plan of Care/Certification Expiration Date: 23  Progress Note Counter: 3      Visit Count:  3    OUTPATIENT PHYSICAL THERAPY:OP NOTE TYPE: OP Note Type: Treatment Note 2023       Episode  }Appt Desk             Treatment Diagnosis:  Difficulty in walking, Not elsewhere classified (R26.2)  Generalized Muscle Weakness (M62.81)  History of falling (Z91.81)  Medical/Referring Diagnosis:  Abnormality of gait due to impairment of balance [R26.89]  History of fall [Z91.81]  Referring Physician:  LITA Emerson MD Orders:  PT Eval and Treat   Date of Onset:  No data recorded   Allergies:   Penicillins, Adhesive tape, and Codeine  Restrictions/Precautions:  Restrictions/Precautions: None  No data recorded   Interventions Planned (Treatment may consist of any combination of the following):    Current Treatment Recommendations: Strengthening; Balance training; Functional mobility training; Stair training; Home exercise program; Safety education & training     >Subjective Comments:  Pt reports feeling okay. Her legs were stiff after her last visit. She does also report some stiffness in her bilateral shoulders at times that comes and goes. >Initial:     3/10>Post Session:       3/10  Medications Last Reviewed:  2023  Updated Objective Findings:  None Today  Treatment   THERAPEUTIC EXERCISE: (45 minutes):    Exercises per grid below to improve mobility and strength. Required minimal verbal cues to promote proper body mechanics. Progressed resistance, range, and repetitions as indicated.   Nu-step level 2.6 x 10

## 2023-08-24 ENCOUNTER — HOSPITAL ENCOUNTER (OUTPATIENT)
Dept: PHYSICAL THERAPY | Age: 73
Setting detail: RECURRING SERIES
Discharge: HOME | End: 2023-08-27
Payer: MEDICARE

## 2023-08-24 PROCEDURE — 97110 THERAPEUTIC EXERCISES: CPT

## 2023-08-24 ASSESSMENT — PAIN SCALES - GENERAL: PAINLEVEL_OUTOF10: 3

## 2023-08-24 NOTE — PROGRESS NOTES
Marcelo Brito  : 1950  Primary: [facility] Medicare Complete (Medicare Managed)  Secondary:  [address] @ [address]  [address] [place] [address]  Phone: [phone]  Fax: [phone] Plan Frequency: 2x per week for 12 weeks    Plan of Care/Certification Expiration Date: 23      >PT Visit Info:  Plan Frequency: 2x per week for 12 weeks  Plan of Care/Certification Expiration Date: 23  Progress Note Counter: 3      Visit Count:  4    OUTPATIENT PHYSICAL THERAPY:OP NOTE TYPE: OP Note Type: Treatment Note 2023       Episode  }Appt Desk             Treatment Diagnosis:  Difficulty in walking, Not elsewhere classified (R26.2)  Generalized Muscle Weakness (M62.81)  History of falling (Z91.81)  Medical/Referring Diagnosis:  Abnormality of gait due to impairment of balance [R26.89]  History of fall [Z91.81]  Referring Physician:  LITA Renee MD Orders:  PT Eval and Treat   Date of Onset:  No data recorded   Allergies:   Penicillins, Adhesive tape, and Codeine  Restrictions/Precautions:  Restrictions/Precautions: None  No data recorded   Interventions Planned (Treatment may consist of any combination of the following):    Current Treatment Recommendations: Strengthening; Balance training; Functional mobility training; Stair training; Home exercise program; Safety education & training     >Subjective Comments:  Pt reports she did not sleep well last night.  >Initial:     3/10>Post Session:       3/10  Medications Last Reviewed:  2023  Updated Objective Findings:  None Today  Treatment   THERAPEUTIC EXERCISE: (40 minutes):    Exercises per grid below to improve mobility and strength. Required minimal verbal cues to promote proper body mechanics. Progressed resistance, range, and repetitions as indicated.   Nu-step level 2.6 x 10 min  Standing marching on airex x 30  Standing hip abduction 4# x 20 bilaterally  Airex tandem stance x 45 sec  Step ups x

## 2023-08-28 ENCOUNTER — HOSPITAL ENCOUNTER (OUTPATIENT)
Dept: PHYSICAL THERAPY | Age: 73
Setting detail: RECURRING SERIES
Discharge: HOME | End: 2023-08-31
Payer: MEDICARE

## 2023-08-28 PROCEDURE — 97110 THERAPEUTIC EXERCISES: CPT

## 2023-08-28 ASSESSMENT — PAIN SCALES - GENERAL: PAINLEVEL_OUTOF10: 3

## 2023-08-31 ENCOUNTER — HOSPITAL ENCOUNTER (OUTPATIENT)
Dept: PHYSICAL THERAPY | Age: 73
Setting detail: RECURRING SERIES
End: 2023-08-31
Payer: MEDICARE

## 2023-08-31 PROCEDURE — 97110 THERAPEUTIC EXERCISES: CPT

## 2023-08-31 ASSESSMENT — PAIN SCALES - GENERAL: PAINLEVEL_OUTOF10: 0

## 2023-08-31 NOTE — PROGRESS NOTES
Leelee Sanchez  : 1950  Primary: [facility] Medicare Complete (Medicare Managed)  Secondary:  [address] @ [address]  [address] [place] [address] VIRAJ  [facility] [address]  Phone: [phone]  Fax: [phone] Plan Frequency: 2x per week for 12 weeks    Plan of Care/Certification Expiration Date: 23      >PT Visit Info:  Plan Frequency: 2x per week for 12 weeks  Plan of Care/Certification Expiration Date: 23  Progress Note Counter: 6      Visit Count:  6    OUTPATIENT PHYSICAL THERAPY:OP NOTE TYPE: OP Note Type: Treatment Note 2023       Episode  }Appt Desk             Treatment Diagnosis:  Difficulty in walking, Not elsewhere classified (R26.2)  Generalized Muscle Weakness (M62.81)  History of falling (Z91.81)  Medical/Referring Diagnosis:  Abnormality of gait due to impairment of balance [R26.89]  History of fall [Z91.81]  Referring Physician:  LITA Recinos MD Orders:  PT Eval and Treat   Date of Onset:  No data recorded   Allergies:   Penicillins, Adhesive tape, and Codeine  Restrictions/Precautions:  Restrictions/Precautions: None  No data recorded   Interventions Planned (Treatment may consist of any combination of the following):    Current Treatment Recommendations: Strengthening; Balance training; Functional mobility training; Stair training; Home exercise program; Safety education & training     >Subjective Comments:  Pt reports she is feeling better overall  >Initial:     0/10>Post Session:       0/10  Medications Last Reviewed:  2023  Updated Objective Findings:  None Today  Treatment   THERAPEUTIC EXERCISE: (45 minutes):    Exercises per grid below to improve mobility and strength. Required minimal verbal cues to promote proper body mechanics. Progressed resistance, range, and repetitions as indicated.   Nu-step level 2.6 x 10 min  Standing marching on airex x 30  Standing hip abduction 4# x 20 bilaterally  Airex tandem stance x 45 sec  Step ups x 20

## 2023-09-07 ENCOUNTER — HOSPITAL ENCOUNTER (OUTPATIENT)
Dept: PHYSICAL THERAPY | Age: 73
Setting detail: RECURRING SERIES
End: 2023-09-07
Payer: MEDICARE

## 2023-09-07 NOTE — PROGRESS NOTES
Najma Neal  : 1950  Primary: [facility] Medicare Complete  Secondary:  [address] @ [address]  [address] [facility] [place] [address] Gustavo Oshea [facility] [address]  Phone: [phone]  Fax: [phone] Plan Frequency: 2x per week for 12 weeks    Plan of Care/Certification Expiration Date: 23      PT Visit Info:  Progress Note Counter: [address] THERAPY 2023     Appt Desk   Episode   MyChart      Pt unable to attend therapy session due to insurance authorization still pending.   Afia Leary, DPT  Future Appointments   Date Time Provider [address]   2023  1:30 PM Isa Morales, PT [facility]   10/23/2023 11:15 AM Lorenza Davis MD [facility]   2023 10:00 AM Roxy Johnson MD [facility]   2024  1:00 PM [facility] ULTRASOUND 1 [facility]   2024  2:00 PM Leobardo Dominguez, APRN - CNP [facility]

## 2023-09-13 ENCOUNTER — HOSPITAL ENCOUNTER (OUTPATIENT)
Dept: PHYSICAL THERAPY | Age: 73
Setting detail: RECURRING SERIES
Discharge: HOME | End: 2023-09-16
Payer: MEDICARE

## 2023-09-13 PROCEDURE — 97110 THERAPEUTIC EXERCISES: CPT

## 2023-09-13 ASSESSMENT — PAIN SCALES - GENERAL: PAINLEVEL_OUTOF10: 1

## 2023-09-13 NOTE — PROGRESS NOTES
Mell Cuevas  : 1950  Primary: [facility] Medicare Complete (Medicare Managed)  Secondary:  [address] @ [address]  [address] [place] [address]  Phone: [phone]  Fax: [phone] Plan Frequency: 2x per week for 12 weeks    Plan of Care/Certification Expiration Date: 23      >PT Visit Info:  Plan Frequency: 2x per week for 12 weeks  Plan of Care/Certification Expiration Date: 23  Progress Note Counter: 7      Visit Count:  7    OUTPATIENT PHYSICAL THERAPY:OP NOTE TYPE: OP Note Type: Treatment Note 2023       Episode  }Appt Desk             Treatment Diagnosis:  Difficulty in walking, Not elsewhere classified (R26.2)  Generalized Muscle Weakness (M62.81)  History of falling (Z91.81)  Medical/Referring Diagnosis:  Abnormality of gait due to impairment of balance [R26.89]  History of fall [Z91.81]  Referring Physician:  LITA Steele MD Orders:  PT Eval and Treat   Date of Onset:  No data recorded   Allergies:   Penicillins, Adhesive tape, and Codeine  Restrictions/Precautions:  Restrictions/Precautions: None  No data recorded   Interventions Planned (Treatment may consist of any combination of the following):    Current Treatment Recommendations: Strengthening; Balance training; Functional mobility training; Stair training; Home exercise program; Safety education & training     >Subjective Comments:  Pt reports feeling tired today. >Initial:     1/10>Post Session:       1/10  Medications Last Reviewed:  2023  Updated Objective Findings:  None Today  Treatment   THERAPEUTIC EXERCISE: (40 minutes):    Exercises per grid below to improve mobility and strength. Required minimal verbal cues to promote proper body mechanics. Progressed resistance, range, and repetitions as indicated.   Nu-step level 2.6 x 10 min  Standing marching on airex x 30  Standing hip abduction 4# x 20 bilaterally  Airex tandem stance x 45 sec  Step ups x 20 each

## 2023-09-15 ENCOUNTER — HOSPITAL ENCOUNTER (OUTPATIENT)
Dept: PHYSICAL THERAPY | Age: 73
Setting detail: RECURRING SERIES
End: 2023-09-15
Payer: MEDICARE

## 2023-09-15 NOTE — PROGRESS NOTES
Adele Martinez  : 1950  Primary: [facility] Medicare Complete  Secondary:  [address] @ [address]  [address] [facility] [place] [address] Gustavo Sp[place] [address]  Phone: [phone]  Fax: [phone] Plan Frequency: 2x per week for 12 weeks    Plan of Care/Certification Expiration Date: 23      PT Visit Info:  Progress Note Counter: 7         OUTPATIENT PHYSICAL THERAPY 9/15/2023     Appt Desk   Episode   MyChart      Pt called to cancel appointment today due to illness.   Axel Peña, DPT    Future Appointments   Date Time Provider [address]   2023 11:45 AM Davion Amos, PT [facility]   2023 11:45 AM Davion Amos, PT [facility]   2023 11:45 AM Davion Amos, PT [facility]   2023 11:45 AM Davion Amos, PT [facility]   10/23/2023 11:15 AM Jorge Luis Silveira MD [facility]   2023 10:00 AM Jl Orozco MD [facility]   2024  1:00 PM [facility] ULTRASOUND 1 [facility]   2024  2:00 PM Jenifer Dominguez, APRN - CNP [facility]

## 2023-09-18 ENCOUNTER — HOSPITAL ENCOUNTER (OUTPATIENT)
Dept: PHYSICAL THERAPY | Age: 73
Setting detail: RECURRING SERIES
Discharge: HOME | End: 2023-09-21
Payer: MEDICARE

## 2023-09-18 PROCEDURE — 97110 THERAPEUTIC EXERCISES: CPT

## 2023-09-18 ASSESSMENT — PAIN SCALES - GENERAL: PAINLEVEL_OUTOF10: 0

## 2023-09-21 ENCOUNTER — HOSPITAL ENCOUNTER (OUTPATIENT)
Dept: PHYSICAL THERAPY | Age: 73
Setting detail: RECURRING SERIES
Discharge: HOME | End: 2023-09-24
Payer: MEDICARE

## 2023-09-21 PROCEDURE — 97110 THERAPEUTIC EXERCISES: CPT

## 2023-09-21 NOTE — PROGRESS NOTES
30  Standing hip abduction 4# x 20 bilaterally  Airex tandem stance x 45 sec  Step ups x 20 each side  Sit-stand x 15  Box taps x 30-held  Gait training x 50 feet  Standing bilateral hip flexion 4# 2 x 10  LAQ bilaterally 5# x 30  Righting reactions x 45 sec  Cone touches with HHA x 20-held  Bridging x 20  Shuttle 75# x 25  Medball carries 2# 2 x 30 feet  4 square for balance  Tandem stance walking   Treatment/Session Summary:    >Treatment Assessment:  Pt progressed well today. Able to complete exercises with SBA to CGA for balance exercises  Communication/Consultation:  None today  Equipment provided today:  None  Recommendations/Intent for next treatment session: Next visit will focus on strengthening, balance, gait training.     >Total Treatment Billable Duration:  45 minutes  Time In: 1200  Time Out: [address] [facility],        Charge Capture  }Post Session Pain  PT Visit Info  [employer] Portal  MD Guidelines  Scanned Media  Benefits  MyChart    Future Appointments   Date Time Provider [address]   9/25/2023 11:45 AM Romero Dixon PT [facility]   9/27/2023 11:45 AM Romero Dixon PT [facility]   10/23/2023 11:15 AM Ade France MD [facility]   12/1/2023 10:00 AM Galdino Alberts MD [facility]   5/29/2024  1:00 PM [facility] ULTRASOUND 1 [facility]   5/29/2024  2:00 PM Elisabeth Dominguez, APRN - CNP [facility]

## 2023-09-25 ENCOUNTER — HOSPITAL ENCOUNTER (OUTPATIENT)
Dept: PHYSICAL THERAPY | Age: 73
Setting detail: RECURRING SERIES
Discharge: HOME | End: 2023-09-28
Payer: MEDICARE

## 2023-09-25 PROCEDURE — 97110 THERAPEUTIC EXERCISES: CPT

## 2023-09-25 ASSESSMENT — PAIN SCALES - GENERAL: PAINLEVEL_OUTOF10: 0

## 2023-09-25 NOTE — PROGRESS NOTES
Amadeo Prim  : 1950  Primary: [facility] Medicare Complete (Medicare Managed)  Secondary:  [address] @ [address] [facility] BLAISE CLAI  [facility] [address]  Phone: [phone]  Fax: [phone] Plan Frequency: 2x per week for 12 weeks    Plan of Care/Certification Expiration Date: 23      >PT Visit Info:  Plan Frequency: 2x per week for 12 weeks  Plan of Care/Certification Expiration Date: 23  Progress Note Counter: 1      Visit Count:  10    OUTPATIENT PHYSICAL THERAPY:OP NOTE TYPE: OP Note Type: Treatment Note 2023       Episode  }Appt Desk             Treatment Diagnosis:  Difficulty in walking, Not elsewhere classified (R26.2)  Generalized Muscle Weakness (M62.81)  History of falling (Z91.81)  Medical/Referring Diagnosis:  Abnormality of gait due to impairment of balance [R26.89]  History of fall [Z91.81]  Referring Physician:  LITA Coates MD Orders:  PT Eval and Treat   Date of Onset:  No data recorded   Allergies:   Penicillins, Adhesive tape, and Codeine  Restrictions/Precautions:  Restrictions/Precautions: None  No data recorded   Interventions Planned (Treatment may consist of any combination of the following):    Current Treatment Recommendations: Strengthening; Balance training; Functional mobility training; Stair training; Home exercise program; Safety education & training     >Subjective Comments:  Pt reports feeling okay today, but she still has some weakness. >Initial:     0/10>Post Session:       0/10  Medications Last Reviewed:  2023  Updated Objective Findings:  None Today  Treatment   THERAPEUTIC EXERCISE: (45 minutes):    Exercises per grid below to improve mobility and strength. Required minimal verbal cues to promote proper body mechanics. Progressed resistance, range, and repetitions as indicated.   Nu-step level 2.6 x 10 min  Standing marching on airex x 30  Airex tandem stance x 45 sec  Step ups x 20 each side  Sit-stand x

## 2023-09-27 ENCOUNTER — HOSPITAL ENCOUNTER (OUTPATIENT)
Dept: PHYSICAL THERAPY | Age: 73
Setting detail: RECURRING SERIES
Discharge: HOME | End: 2023-09-30
Payer: MEDICARE

## 2023-09-27 PROCEDURE — 97110 THERAPEUTIC EXERCISES: CPT

## 2023-09-27 ASSESSMENT — PAIN SCALES - GENERAL: PAINLEVEL_OUTOF10: 5

## 2023-09-27 NOTE — PROGRESS NOTES
Marcelo Brito  : 1950  Primary: [facility] Medicare Complete (Medicare Managed)  Secondary:  Kasie Danielle @ [address] [facility] [place] [address]  Phone: [phone]  Fax: [phone] Plan Frequency: 2x per week for 12 weeks    Plan of Care/Certification Expiration Date: 23      >PT Visit Info:  Plan Frequency: 2x per week for 12 weeks  Plan of Care/Certification Expiration Date: 23  Progress Note Counter: 2      Visit Count:  11    OUTPATIENT PHYSICAL THERAPY:OP NOTE TYPE: OP Note Type: Treatment Note 2023       Episode  }Appt Desk             Treatment Diagnosis:  Difficulty in walking, Not elsewhere classified (R26.2)  Generalized Muscle Weakness (M62.81)  History of falling (Z91.81)  Medical/Referring Diagnosis:  Abnormality of gait due to impairment of balance [R26.89]  History of fall [Z91.81]  Referring Physician:  LITA Renee MD Orders:  PT Eval and Treat   Date of Onset:  No data recorded   Allergies:   Penicillins, Adhesive tape, and Codeine  Restrictions/Precautions:  Restrictions/Precautions: None  No data recorded   Interventions Planned (Treatment may consist of any combination of the following):    Current Treatment Recommendations: Strengthening; Balance training; Functional mobility training; Stair training; Home exercise program; Safety education & training     >Subjective Comments:  Pt reports shoulder is giving her more pain right now.  >Initial:     5/10>Post Session:       4/10  Medications Last Reviewed:  2023  Updated Objective Findings:  None Today  Treatment   THERAPEUTIC EXERCISE: (45 minutes):    Exercises per grid below to improve mobility and strength. Required minimal verbal cues to promote proper body mechanics. Progressed resistance, range, and repetitions as indicated.   Nu-step level 2.6 x 10 min  Standing marching on airex x 30  Airex tandem stance x 45 sec  Step ups x 20 each side  Sit-stand x 15  Box

## 2023-10-06 ENCOUNTER — HOSPITAL ENCOUNTER (OUTPATIENT)
Dept: PHYSICAL THERAPY | Age: 73
Setting detail: RECURRING SERIES
End: 2023-10-06
Payer: MEDICARE

## 2023-10-06 NOTE — PROGRESS NOTES
Clinton Johnson  : 1950  Primary: [facility] Medicare Complete  Secondary:  [address] @ [address]  [address] [address] [address]avo Quar[place] [address]  Phone: [phone]  Fax: [phone] Plan Frequency: 2x per week for 12 weeks    Plan of Care/Certification Expiration Date: 23      PT Visit Info:  Progress Note Counter: 2         OUTPATIENT PHYSICAL THERAPY 10/6/2023     Appt Desk   Episode   MyChart      Pt called to cancel appointment today due to illness.   Leigh Ann Falk, DPT    Future Appointments   Date Time Provider [address]   10/6/2023 11:45 AM Cathy Dong, PT [facility]   10/23/2023 11:15 AM Es Andino MD [facility]   2023 10:00 AM Mat Wallace MD [facility]   2024  1:00 PM [facility] ULTRASOUND 1 [facility]   2024  2:00 PM Standard, Lonza Ebbs, APRN - CNP [facility]

## 2023-10-17 ENCOUNTER — HOSPITAL ENCOUNTER (OUTPATIENT)
Dept: PHYSICAL THERAPY | Age: 73
Setting detail: RECURRING SERIES
Discharge: HOME | End: 2023-10-20
Payer: MEDICARE

## 2023-10-17 PROCEDURE — 97110 THERAPEUTIC EXERCISES: CPT

## 2023-10-17 ASSESSMENT — PAIN SCALES - GENERAL: PAINLEVEL_OUTOF10: 0

## 2023-10-17 NOTE — PROGRESS NOTES
Negrito Diallo  : 1950  Primary: [facility] Medicare Complete (Medicare Managed)  Secondary:  [address] @ [address]  [address] [place] [address] VIRAJ  [facility] [address]  Phone: [phone]  Fax: [phone] Plan Frequency: 2x per week for 12 weeks    Plan of Care/Certification Expiration Date: 23      >PT Visit Info:  Plan Frequency: 2x per week for 12 weeks  Plan of Care/Certification Expiration Date: 23  Progress Note Counter: 3      Visit Count:  12    OUTPATIENT PHYSICAL THERAPY:OP NOTE TYPE: OP Note Type: Treatment Note 10/17/2023       Episode  }Appt Desk             Treatment Diagnosis:  Difficulty in walking, Not elsewhere classified (R26.2)  Generalized Muscle Weakness (M62.81)  History of falling (Z91.81)  Medical/Referring Diagnosis:  Abnormality of gait due to impairment of balance [R26.89]  History of fall [Z91.81]  Referring Physician:  LITA Appiah MD Orders:  PT Eval and Treat   Date of Onset:  No data recorded   Allergies:   Penicillins, Adhesive tape, and Codeine  Restrictions/Precautions:  Restrictions/Precautions: None  No data recorded   Interventions Planned (Treatment may consist of any combination of the following):    Current Treatment Recommendations: Strengthening; Balance training; Functional mobility training; Stair training; Home exercise program; Safety education & training     >Subjective Comments:  Pt reports she is feeling okay. She was sick and is now feeling better. She reports not using her cane over the past several weeks  >Initial:     0/10>Post Session:       0/10  Medications Last Reviewed:  10/17/2023  Updated Objective Findings:  None Today  Treatment   THERAPEUTIC EXERCISE: (45 minutes):    Exercises per grid below to improve mobility and strength. Required minimal verbal cues to promote proper body mechanics. Progressed resistance, range, and repetitions as indicated.   Nu-step level 2.6 x 10 min  Standing marching on airex x

## 2023-10-17 NOTE — THERAPY DISCHARGE
Lou Castellanos  : 1950  Primary: [facility] Medicare Complete (Medicare Managed)  Secondary:  [address] @ [address] [address]  Phone: [phone]  Fax: [phone] Plan Frequency: 2x per week for 12 weeks    Plan of Care/Certification Expiration Date: 23      PT Visit Info:  Plan Frequency: 2x per week for 12 weeks  Plan of Care/Certification Expiration Date: 23  Progress Note Counter: 3      Visit Count:  12                OUTPATIENT PHYSICAL THERAPY:             Discharge Summary 10/17/2023               Episode (weakness, decreased balance) Appt Desk         Treatment Diagnosis:  Difficulty in walking, Not elsewhere classified (R26.2)  Generalized Muscle Weakness (M62.81)  History of falling (Z91.81)  Medical/Referring Diagnosis:  Abnormality of gait due to impairment of balance [R26.89]  History of fall [Z91.81]  Referring Physician:  LITA Lund MD Orders:  PT Eval and Treat   Return MD Appt:  as needed  Date of Onset:     chronic  Allergies:  Penicillins, Adhesive tape, and Codeine  Restrictions/Precautions:    Restrictions/Precautions: None        Medications Last Reviewed:  10/17/2023     SUBJECTIVE   History of Injury/Illness (Reason for Referral):  Pt reports history of falling over the past year and she is having a fear of falling as well. She is remaining mostly at home unless she needs to go to doctors appointments. She reports that she often falls backwards and has been hospitalized once after one of her falls. She is currently using cane for ambulation.   Patient Stated Goal(s):  \"to work on balance, improve strength, decrease falls\"  Initial:     010 Post Session:     010  Past Medical History/Comorbidities:   Ms. Solitario Velasquez  has a past medical history of Adverse effect of anesthesia, Angina pectoris ([address]), Arthritis, CAD (coronary artery disease), Chest pain, unspecified, Coronary atherosclerosis of native

## 2023-10-23 ENCOUNTER — OFFICE VISIT (OUTPATIENT)
Age: 73
End: 2023-10-23
Payer: MEDICARE

## 2023-10-23 VITALS
SYSTOLIC BLOOD PRESSURE: 138 MMHG | HEART RATE: 68 BPM | DIASTOLIC BLOOD PRESSURE: 82 MMHG | WEIGHT: 184 LBS | HEIGHT: 60 IN | BODY MASS INDEX: 36.12 KG/M2

## 2023-10-23 DIAGNOSIS — I10 ESSENTIAL (PRIMARY) HYPERTENSION: Primary | ICD-10-CM

## 2023-10-23 DIAGNOSIS — I34.0 MITRAL VALVE INSUFFICIENCY, UNSPECIFIED ETIOLOGY: ICD-10-CM

## 2023-10-23 DIAGNOSIS — R06.09 DOE (DYSPNEA ON EXERTION): ICD-10-CM

## 2023-10-23 DIAGNOSIS — Z95.0 CARDIAC PACEMAKER IN SITU: ICD-10-CM

## 2023-10-23 PROCEDURE — G8417 CALC BMI ABV UP PARAM F/U: HCPCS | Performed by: INTERNAL MEDICINE

## 2023-10-23 PROCEDURE — G8427 DOCREV CUR MEDS BY ELIG CLIN: HCPCS | Performed by: INTERNAL MEDICINE

## 2023-10-23 PROCEDURE — 1090F PRES/ABSN URINE INCON ASSESS: CPT | Performed by: INTERNAL MEDICINE

## 2023-10-23 PROCEDURE — 99214 OFFICE O/P EST MOD 30 MIN: CPT | Performed by: INTERNAL MEDICINE

## 2023-10-23 PROCEDURE — 1123F ACP DISCUSS/DSCN MKR DOCD: CPT | Performed by: INTERNAL MEDICINE

## 2023-10-23 PROCEDURE — 3017F COLORECTAL CA SCREEN DOC REV: CPT | Performed by: INTERNAL MEDICINE

## 2023-10-23 PROCEDURE — G8400 PT W/DXA NO RESULTS DOC: HCPCS | Performed by: INTERNAL MEDICINE

## 2023-10-23 PROCEDURE — 3079F DIAST BP 80-89 MM HG: CPT | Performed by: INTERNAL MEDICINE

## 2023-10-23 PROCEDURE — 1036F TOBACCO NON-USER: CPT | Performed by: INTERNAL MEDICINE

## 2023-10-23 PROCEDURE — G8484 FLU IMMUNIZE NO ADMIN: HCPCS | Performed by: INTERNAL MEDICINE

## 2023-10-23 PROCEDURE — 3075F SYST BP GE 130 - 139MM HG: CPT | Performed by: INTERNAL MEDICINE

## 2023-10-23 ASSESSMENT — ENCOUNTER SYMPTOMS
NAIL CHANGES: 0
EYE PAIN: 0
APHONIA: 0
SHORTNESS OF BREATH: 1
COUGH: 0
STRIDOR: 0
ABDOMINAL PAIN: 0

## 2023-10-30 PROCEDURE — 93296 REM INTERROG EVL PM/IDS: CPT | Performed by: INTERNAL MEDICINE

## 2023-10-30 PROCEDURE — 93294 REM INTERROG EVL PM/LDLS PM: CPT | Performed by: INTERNAL MEDICINE

## 2023-11-10 DIAGNOSIS — G25.0 ESSENTIAL TREMOR: ICD-10-CM

## 2023-11-11 RX ORDER — PROPRANOLOL HYDROCHLORIDE 20 MG/1
20 TABLET ORAL 2 TIMES DAILY
Qty: 60 TABLET | Refills: 5 | Status: SHIPPED | OUTPATIENT
Start: 2023-11-11

## 2023-11-30 ENCOUNTER — HOSPITAL ENCOUNTER (OUTPATIENT)
Dept: MAMMOGRAPHY | Age: 73
End: 2023-11-30
Payer: MEDICARE

## 2023-11-30 ENCOUNTER — HOSPITAL ENCOUNTER (OUTPATIENT)
Dept: MAMMOGRAPHY | Age: 73
Discharge: HOME | End: 2023-11-30
Payer: MEDICARE

## 2023-11-30 DIAGNOSIS — Z78.0 POSTMENOPAUSAL: ICD-10-CM

## 2023-11-30 DIAGNOSIS — Z12.31 ENCOUNTER FOR SCREENING MAMMOGRAM FOR BREAST CANCER: ICD-10-CM

## 2023-11-30 PROCEDURE — 77080 DXA BONE DENSITY AXIAL: CPT

## 2023-11-30 PROCEDURE — 77067 SCR MAMMO BI INCL CAD: CPT

## 2023-12-01 ENCOUNTER — OFFICE VISIT (OUTPATIENT)
Dept: NEUROLOGY | Age: 73
End: 2023-12-01
Payer: MEDICARE

## 2023-12-01 VITALS
OXYGEN SATURATION: 96 % | WEIGHT: 187 LBS | HEART RATE: 67 BPM | SYSTOLIC BLOOD PRESSURE: 122 MMHG | BODY MASS INDEX: 36.52 KG/M2 | DIASTOLIC BLOOD PRESSURE: 78 MMHG

## 2023-12-01 DIAGNOSIS — G31.84 MILD COGNITIVE IMPAIRMENT: ICD-10-CM

## 2023-12-01 DIAGNOSIS — R26.89 ABNORMALITY OF GAIT DUE TO IMPAIRMENT OF BALANCE: ICD-10-CM

## 2023-12-01 DIAGNOSIS — G25.0 ESSENTIAL TREMOR: Primary | ICD-10-CM

## 2023-12-01 PROCEDURE — 3074F SYST BP LT 130 MM HG: CPT | Performed by: PSYCHIATRY & NEUROLOGY

## 2023-12-01 PROCEDURE — 3078F DIAST BP <80 MM HG: CPT | Performed by: PSYCHIATRY & NEUROLOGY

## 2023-12-01 PROCEDURE — 1123F ACP DISCUSS/DSCN MKR DOCD: CPT | Performed by: PSYCHIATRY & NEUROLOGY

## 2023-12-01 PROCEDURE — 99214 OFFICE O/P EST MOD 30 MIN: CPT | Performed by: PSYCHIATRY & NEUROLOGY

## 2023-12-01 ASSESSMENT — ENCOUNTER SYMPTOMS
VOICE CHANGE: 1
COUGH: 0
ABDOMINAL PAIN: 0

## 2023-12-01 ASSESSMENT — PATIENT HEALTH QUESTIONNAIRE - PHQ9
SUM OF ALL RESPONSES TO PHQ9 QUESTIONS 1 & 2: 0
SUM OF ALL RESPONSES TO PHQ QUESTIONS 1-9: 0
1. LITTLE INTEREST OR PLEASURE IN DOING THINGS: 0
SUM OF ALL RESPONSES TO PHQ QUESTIONS 1-9: 0
2. FEELING DOWN, DEPRESSED OR HOPELESS: 0

## 2024-01-01 NOTE — Clinical Note
"Assessment:     4 days female infant.     1. Well baby, under 8 days old    2. Poor weight gain (0-17)    3. Inadequate vitamin D and vitamin D derivative intake  -     Cholecalciferol (UpSpring Baby Vit D) 10 MCG /0.025ML LIQD; Take 0.025 mL by mouth daily        Plan:         1. Anticipatory guidance discussed.  Specific topics reviewed: adequate diet for breastfeeding, call for jaundice, decreased feeding, or fever, car seat issues, including proper placement, impossible to \"spoil\" infants at this age, safe sleep furniture, sleep face up to decrease chances of SIDS, smoke detectors and carbon monoxide detectors, typical  feeding habits, and umbilical cord stump care.    2. Screening tests:   a. State  metabolic screen: pending   b. Hearing screen (OAE, ABR): PASS  c. CCHD screen: passed  d. Bilirubin 5.85 mg/dl at 25 hours of life.  Bilirubin level is >7 mg/dL below phototherapy threshold and age is <72 hours old. Discharge follow-up recommended within 3 days., TcB/TSB according to clinical judgment.    3. Ultrasound of the hips to screen for developmental dysplasia of the hip: not applicable    4. Immunizations today: None  RSV MAB was offered.  Mom to consider at the next visit.     5. Follow-up visit in 1 week for next well child visit, or sooner as needed.       Subjective:      History was provided by the mother.    Sanjay Tucker is a 4 days female who was brought in for this well visit.    Birth History    Birth     Length: 20.5\" (52.1 cm)     Weight: 3190 g (7 lb 0.5 oz)     HC 35 cm (13.78\")    Apgar     One: 6     Five: 8     Ten: 9    Discharge Weight: 2875 g (6 lb 5.4 oz)    Delivery Method: , Low Transverse    Gestation Age: 40 4/7 wks    Feeding: Breast Fed    Days in Hospital: 3.0    Hospital Name: [facility] Location: [place], [place]     No Adrien-u, umbilical intact,        Weight change since birth: -11%    Current Issues:  Current " Defibrillation and grounding pads were applied. concerns: none.    Review of Nutrition:  Current diet: breast milk  Current feeding patterns: 20-30 minutes per 2-2.5 hours   Difficulties with feeding? no  Wet diapers in 24 hours: 4-5 times a day  Current stooling frequency: 2 times a day    Social Screening:  Current child-care arrangements: in home: primary caregiver is mother  Sibling relations: sisters: 1  Parental coping and self-care: doing well; no concerns  Secondhand smoke exposure? no     Well Child Assessment:    Nutrition  Feeding problems do not include vomiting.   Elimination  Elimination problems do not include diarrhea.            The following portions of the patient's history were reviewed and updated as appropriate: She  has no past medical history on file.  She   Patient Active Problem List    Diagnosis Date Noted    Term  delivered by  section, current hospitalization 2024    Passage of meconium during delivery affecting  2024     She  has no past surgical history on file.  Her family history includes No Known Problems in her father, maternal grandfather, maternal grandmother, mother, and sister.  She  has no history on file for tobacco use, alcohol use, and drug use.  Current Outpatient Medications   Medication Sig Dispense Refill    Cholecalciferol (UpSpring Baby Vit D) 10 MCG /0.025ML LIQD Take 0.025 mL by mouth daily       No current facility-administered medications for this visit.     No current outpatient medications on file prior to visit.     Current Facility-Administered Medications on File Prior to Visit   Medication    [DISCONTINUED] sucrose 24 % oral solution 1 mL     She has No Known Allergies..    Immunizations:   Immunization History   Administered Date(s) Administered    Hep B, Adolescent or Pediatric 2024       Mother's blood type:   ABO Grouping   Date Value Ref Range Status   2024 O  Final     Rh Factor   Date Value Ref Range Status   2024 Positive  Final      Baby's blood  "type:   ABO Grouping   Date Value Ref Range Status   2024 O  Final     Rh Factor   Date Value Ref Range Status   2024 Positive  Final     Bilirubin:   Total Bilirubin   Date Value Ref Range Status   2024 5.85 0.19 - 6.00 mg/dL Final     Comment:     Use of this assay is not recommended for patients undergoing treatment with eltrombopag due to the potential for falsely elevated results.  N-acetyl-p-benzoquinone imine (metabolite of Acetaminophen) will generate erroneously low results in samples for patients that have taken an overdose of Acetaminophen.       Maternal Information     Prenatal Labs     Lab Results   Component Value Date/Time    Chlamydia trachomatis, DNA Probe Negative 10/03/2019 06:25 PM    N gonorrhoeae, DNA Probe Negative 10/03/2019 06:25 PM    ABO Grouping O 2024 07:55 AM    Rh Factor Positive 2024 07:55 AM    Hepatitis B Surface Ag Non-reactive 09/19/2023 01:08 PM    Hepatitis C Ab Non-reactive 09/19/2023 01:08 PM    Rubella IgG Quant 43.6 09/19/2023 01:08 PM    Glucose 99 10/24/2023 01:27 PM          Objective:     Growth parameters are noted and are not appropriate for age.    Wt Readings from Last 1 Encounters:   02/07/24 2835 g (6 lb 4 oz) (12%, Z= -1.16)*     * Growth percentiles are based on WHO (Girls, 0-2 years) data.     Ht Readings from Last 1 Encounters:   02/07/24 18.5\" (47 cm) (7%, Z= -1.47)*     * Growth percentiles are based on WHO (Girls, 0-2 years) data.      Head Circumference: 34.9 cm (13.75\")Review of Systems   Constitutional:  Negative for fever and irritability.   HENT:  Negative for congestion, ear discharge and rhinorrhea.    Eyes:  Negative for discharge and redness.   Respiratory:  Negative for cough.    Cardiovascular:  Negative for fatigue with feeds.   Gastrointestinal:  Negative for diarrhea and vomiting.   Genitourinary:  Negative for decreased urine volume.   Musculoskeletal:  Negative for joint swelling.   Skin:  Negative for rash.    " "    Vitals:    02/07/24 1042   Pulse: 148   Resp: 40   Temp: 98.4 °F (36.9 °C)   TempSrc: Axillary   Weight: 2835 g (6 lb 4 oz)   Height: 18.5\" (47 cm)   HC: 34.9 cm (13.75\")       Physical Exam  Vitals reviewed.   Constitutional:       General: She is active. She is not in acute distress.     Appearance: Normal appearance. She is well-developed. She is not toxic-appearing.   HENT:      Head: Normocephalic and atraumatic. Anterior fontanelle is flat.      Right Ear: Tympanic membrane normal.      Left Ear: Tympanic membrane normal.      Nose: Nose normal.      Mouth/Throat:      Mouth: Mucous membranes are moist.      Pharynx: Oropharynx is clear. No posterior oropharyngeal erythema.   Eyes:      General: Red reflex is present bilaterally.         Right eye: No discharge.         Left eye: No discharge.      Conjunctiva/sclera: Conjunctivae normal.      Pupils: Pupils are equal, round, and reactive to light.   Cardiovascular:      Rate and Rhythm: Normal rate and regular rhythm.      Pulses: Normal pulses.      Heart sounds: Normal heart sounds, S1 normal and S2 normal. No murmur heard.  Pulmonary:      Effort: Pulmonary effort is normal. No respiratory distress or retractions.      Breath sounds: Normal breath sounds. No decreased air movement. No wheezing or rhonchi.   Abdominal:      General: Bowel sounds are normal. There is no distension.      Palpations: Abdomen is soft. There is no mass.      Tenderness: There is no abdominal tenderness.   Genitourinary:     Comments: Demetri 1 female  Musculoskeletal:         General: Normal range of motion.      Cervical back: Normal range of motion and neck supple.      Right hip: Negative right Ortolani and negative right Szymanski.      Left hip: Negative left Ortolani and negative left Szymanski.      Comments: Hips Stable.    Lymphadenopathy:      Head: No occipital adenopathy.      Cervical: No cervical adenopathy.   Skin:     General: Skin is warm and dry.      Findings: No " rash.   Neurological:      Mental Status: She is alert.      Motor: No abnormal muscle tone.      Primitive Reflexes: Suck normal. Symmetric [place].

## 2024-03-06 RX ORDER — ATORVASTATIN CALCIUM 80 MG/1
80 TABLET, FILM COATED ORAL DAILY
Qty: 90 TABLET | Refills: 3 | Status: SHIPPED | OUTPATIENT
Start: 2024-03-06

## 2024-03-06 NOTE — TELEPHONE ENCOUNTER
Requested Prescriptions     Pending Prescriptions Disp Refills    atorvastatin (LIPITOR) 80 MG tablet [Pharmacy Med Name: ATORVASTATIN 80 MG TABLET] 90 tablet 3     Sig: TAKE 1 TABLET BY MOUTH EVERY DAY     Verified rx. Last OV 10/23/2023. Erx to pharm on file.

## 2024-04-16 ENCOUNTER — OFFICE VISIT (OUTPATIENT)
Age: 74
End: 2024-04-16
Payer: MEDICARE

## 2024-04-16 ENCOUNTER — NURSE ONLY (OUTPATIENT)
Age: 74
End: 2024-04-16
Payer: MEDICARE

## 2024-04-16 VITALS
HEIGHT: 60 IN | HEART RATE: 72 BPM | BODY MASS INDEX: 37.5 KG/M2 | WEIGHT: 191 LBS | SYSTOLIC BLOOD PRESSURE: 100 MMHG | DIASTOLIC BLOOD PRESSURE: 70 MMHG

## 2024-04-16 DIAGNOSIS — E78.5 HYPERLIPIDEMIA, UNSPECIFIED HYPERLIPIDEMIA TYPE: ICD-10-CM

## 2024-04-16 DIAGNOSIS — Z95.0 CARDIAC PACEMAKER IN SITU: ICD-10-CM

## 2024-04-16 DIAGNOSIS — R00.1 BRADYCARDIA: Primary | ICD-10-CM

## 2024-04-16 DIAGNOSIS — I10 ESSENTIAL (PRIMARY) HYPERTENSION: Primary | ICD-10-CM

## 2024-04-16 DIAGNOSIS — Z72.0 TOBACCO ABUSE: ICD-10-CM

## 2024-04-16 DIAGNOSIS — M25.551 PAIN OF RIGHT HIP: ICD-10-CM

## 2024-04-16 LAB
CHOLEST SERPL-MCNC: 154 MG/DL
HDLC SERPL-MCNC: 52 MG/DL (ref 40–60)
HDLC SERPL: 3
LDLC SERPL CALC-MCNC: 68.4 MG/DL
TRIGL SERPL-MCNC: 168 MG/DL (ref 35–150)
VLDLC SERPL CALC-MCNC: 33.6 MG/DL (ref 6–23)

## 2024-04-16 PROCEDURE — 3078F DIAST BP <80 MM HG: CPT | Performed by: INTERNAL MEDICINE

## 2024-04-16 PROCEDURE — 3074F SYST BP LT 130 MM HG: CPT | Performed by: INTERNAL MEDICINE

## 2024-04-16 PROCEDURE — 1123F ACP DISCUSS/DSCN MKR DOCD: CPT | Performed by: INTERNAL MEDICINE

## 2024-04-16 PROCEDURE — 99214 OFFICE O/P EST MOD 30 MIN: CPT | Performed by: INTERNAL MEDICINE

## 2024-04-16 ASSESSMENT — ENCOUNTER SYMPTOMS
ABDOMINAL PAIN: 0
EYE PAIN: 0
STRIDOR: 0
NAIL CHANGES: 0
APHONIA: 0
SHORTNESS OF BREATH: 1
COUGH: 0

## 2024-04-16 NOTE — PROGRESS NOTES
[facility], [address], [address]  [place] [address]  PHONE: [phone]    SUBJECTIVE:   Anum Finn is a 74 y.o. female 1950   seen for a follow up visit regarding the following:     Chief Complaint   Patient presents with    6 Month Follow-Up    Hypertension       History of present illness: 74 y.o. female presented for follow-up 4/16/24 history of pacemaker placement 1/2023 she continues to have pain in her right hip after completing physical therapy mobility is limited due to hip pain.  She has gained weight due to inability to exercise.  Issues with cost of Xarelto which was a medication that she was on prior to her first appointment at [facility] with me.    Interval Hx   She was referred for permanent pacemaker placement underwent [employer] dual-chamber pacemaker placement 1/26/2023 additional history of tremor.  She has a history of multivessel coronary artery disease her last cardiac catheterization was in 2012 with PCI to multiple vessels.  She continues to have dyspnea on exertion    Cardiac history:  2012 cardiac catheterization percutaneous intervention to proximal LAD large septal  percutaneous intervention ostial LAD PCI circumflex  Left Ventricle: Normal left ventricular systolic function with   Visuall estimated EF of 60 - 65%. Left ventricle size is normal. Mildly  increased wall thickness. Normal wall motion. Abnormal diastolic  Moderate mitral regurgitation  1/26/2023 [employer] dual-chamber pacemaker placement  SPECT Perfusion Imaging: Abnormal.   There is a mild severity left ventricular stress perfusion defect that is small in size present in the basal inferior segment(s) that is reversible [SDS score of 2 with 2% reversibility].       Assessment:   Pacemaker in situ  <1% RV pacing   Coronary artery disease  Controlled discussed results of stress test low risk study no angina continue medical therapy  History of tobacco

## 2024-04-17 PROCEDURE — 93280 PM DEVICE PROGR EVAL DUAL: CPT | Performed by: INTERNAL MEDICINE

## 2024-05-17 PROCEDURE — 93296 REM INTERROG EVL PM/IDS: CPT | Performed by: INTERNAL MEDICINE

## 2024-05-17 PROCEDURE — 93294 REM INTERROG EVL PM/LDLS PM: CPT | Performed by: INTERNAL MEDICINE

## 2024-05-29 ENCOUNTER — OFFICE VISIT (OUTPATIENT)
Dept: VASCULAR SURGERY | Age: 74
End: 2024-05-29
Payer: MEDICARE

## 2024-05-29 VITALS
OXYGEN SATURATION: 97 % | HEIGHT: 60 IN | DIASTOLIC BLOOD PRESSURE: 77 MMHG | HEART RATE: 64 BPM | BODY MASS INDEX: 37.3 KG/M2 | SYSTOLIC BLOOD PRESSURE: 117 MMHG | WEIGHT: 190 LBS

## 2024-05-29 DIAGNOSIS — I73.9 PAD (PERIPHERAL ARTERY DISEASE) (HCC): ICD-10-CM

## 2024-05-29 DIAGNOSIS — Z98.62 S/P PERIPHERAL ARTERY ANGIOPLASTY: ICD-10-CM

## 2024-05-29 DIAGNOSIS — I77.1 ILIAC ARTERY STENOSIS, BILATERAL (HCC): Primary | ICD-10-CM

## 2024-05-29 PROCEDURE — 1123F ACP DISCUSS/DSCN MKR DOCD: CPT | Performed by: NURSE PRACTITIONER

## 2024-05-29 PROCEDURE — 3074F SYST BP LT 130 MM HG: CPT | Performed by: NURSE PRACTITIONER

## 2024-05-29 PROCEDURE — 3078F DIAST BP <80 MM HG: CPT | Performed by: NURSE PRACTITIONER

## 2024-05-29 PROCEDURE — 99213 OFFICE O/P EST LOW 20 MIN: CPT | Performed by: NURSE PRACTITIONER

## 2024-05-29 NOTE — PROGRESS NOTES
DATE OF VISIT: 5/29/2024      HPI  Anum Finn is a 74 y.o. female is here in follow up for her lower extremity duplex study.  She denies any lifestyle limiting claudication, rest pain or new open ulcerations.  She remains on aspirin and atorvastatin. She has undergone bilateral iliac stenting.         MEDICAL HISTORY:   Past Medical History:   Diagnosis Date    Adverse effect of anesthesia     hard to wake after    Angina pectoris (HCC) 10/13/2015    Arthritis     hands & hips    CAD (coronary artery disease)     5/06 MI and 4 vessel CABG, patient states she has angina pain-last time takingNTG 2 week ago    Chest pain, unspecified 5/27/2011    Coronary atherosclerosis of native coronary artery 10/13/2015    Dyslipidemia 5/27/2011    Dyspnea 10/13/2015    Essential tremor 9/27/2022    Extremity atherosclerosis with intermittent claudication (HCC) 10/13/2015    GERD (gastroesophageal reflux disease)     tums    Heart failure ([facility])     Hx of CABG 5/27/2011    LIMA and 3 SVG. 2 OMs/PLV and one acute marginal of RCA     Hypertension     LVH (left ventricular hypertrophy) 10/13/2015    MI, old     Peripheral arterial disease (HCC) 6/29/2012    Pleural effusion 10/13/2015    Psychiatric disorder     S/P coronary artery stent placement 5/27/2011    2.0 x 15mm Xience to OM2-5/27/11     S/P peripheral artery angioplasty 6/29/2012    Severe obesity (BMI 35.0-39.9) 7/19/2018    Sleep apnea     does not wear cpap- weight loss    Tobacco use disorder 10/13/2015    Unspecified adverse effect of anesthesia     slow to wake up         SURGICAL HISTORY:   Past Surgical History:   Procedure Laterality Date    APPENDECTOMY      BREAST SURGERY      biopsy left    CHOLECYSTECTOMY      08/01/09    EP DEVICE PROCEDURE N/A 1/26/2023    INSERT PPM DUAL performed by Michael Fernandez MD at [facility] CARDIAC CATH LAB    GASTRIC BYPASS SURGERY      gastric bypass    HYSTERECTOMY (CERVIX STATUS UNKNOWN)       partial hyst.,bilat

## 2024-06-05 ENCOUNTER — OFFICE VISIT (OUTPATIENT)
Dept: ORTHOPEDIC SURGERY | Age: 74
End: 2024-06-05
Payer: MEDICARE

## 2024-06-05 DIAGNOSIS — E66.01 MORBID OBESITY (HCC): ICD-10-CM

## 2024-06-05 DIAGNOSIS — M25.551 HIP PAIN, RIGHT: Primary | ICD-10-CM

## 2024-06-05 DIAGNOSIS — M54.50 CHRONIC RIGHT-SIDED LOW BACK PAIN, UNSPECIFIED WHETHER SCIATICA PRESENT: ICD-10-CM

## 2024-06-05 DIAGNOSIS — G89.29 CHRONIC RIGHT-SIDED LOW BACK PAIN, UNSPECIFIED WHETHER SCIATICA PRESENT: ICD-10-CM

## 2024-06-05 PROCEDURE — 1123F ACP DISCUSS/DSCN MKR DOCD: CPT | Performed by: ORTHOPAEDIC SURGERY

## 2024-06-05 PROCEDURE — 99204 OFFICE O/P NEW MOD 45 MIN: CPT | Performed by: ORTHOPAEDIC SURGERY

## 2024-06-05 NOTE — PROGRESS NOTES
Name: Anum Finn  YOB: 1950  Gender: female  MRN: [id number]    CC: Right lower back and buttock pain    HPI: Anum Finn is a 74 y.o. female who presents with right lower back and buttock pain.  She does have a history of significant cardiac issues and recently saw her cardiologist.  She had been encouraged to get into a walking program is been unable to do so complaining of pain in her lower back and buttock.  She reports that she is relatively sedentary.  She is have a history of significant vascular stenting with iliofemoral stents.  This was recently evaluated by vascular surgery in late May and found to have good patency.    She does use a cane for stability and states she was sent to physical therapy last year for gait training.    She denies pain in the groin or thigh.  She denies numbness ting down the legs.  She states she has had back pain for years but does not see anyone for it.    She does have history of morbid obesity with a BMI over 37 at this time.  She does have a history of remote gastric bypass surgery.    History was obtained from patient and her daughter accompanies her    ROS/Meds/PSH/PMH/FH/SH: I personally reviewed the patients standard intake form.  Below are the pertinents    Tobacco:  reports that she quit smoking about 18 years ago. Her smoking use included cigarettes. She has never used smokeless tobacco.  Past Medical History:   Diagnosis Date    Adverse effect of anesthesia     hard to wake after    Angina pectoris (HCC) 10/13/2015    Arthritis     hands & hips    CAD (coronary artery disease)     5/06 MI and 4 vessel CABG, patient states she has angina pain-last time takingNTG 2 week ago    Chest pain, unspecified 5/27/2011    Coronary atherosclerosis of native coronary artery 10/13/2015    Dyslipidemia 5/27/2011    Dyspnea 10/13/2015    Essential tremor 9/27/2022    Extremity atherosclerosis with intermittent claudication (HCC) 10/13/2015    GERD

## 2024-06-07 ENCOUNTER — PATIENT MESSAGE (OUTPATIENT)
Dept: NEUROLOGY | Age: 74
End: 2024-06-07

## 2024-06-07 DIAGNOSIS — G25.0 ESSENTIAL TREMOR: ICD-10-CM

## 2024-06-08 RX ORDER — PROPRANOLOL HYDROCHLORIDE 20 MG/1
20 TABLET ORAL 2 TIMES DAILY
Qty: 60 TABLET | Refills: 2 | Status: SHIPPED | OUTPATIENT
Start: 2024-06-08

## 2024-06-21 ENCOUNTER — OFFICE VISIT (OUTPATIENT)
Dept: NEUROLOGY | Age: 74
End: 2024-06-21
Payer: MEDICARE

## 2024-06-21 VITALS
SYSTOLIC BLOOD PRESSURE: 102 MMHG | HEIGHT: 60 IN | HEART RATE: 64 BPM | WEIGHT: 190 LBS | OXYGEN SATURATION: 95 % | DIASTOLIC BLOOD PRESSURE: 77 MMHG | BODY MASS INDEX: 37.3 KG/M2

## 2024-06-21 DIAGNOSIS — R26.89 ABNORMALITY OF GAIT DUE TO IMPAIRMENT OF BALANCE: ICD-10-CM

## 2024-06-21 DIAGNOSIS — G31.84 MILD COGNITIVE IMPAIRMENT: ICD-10-CM

## 2024-06-21 DIAGNOSIS — G25.0 ESSENTIAL TREMOR: Primary | ICD-10-CM

## 2024-06-21 PROCEDURE — 3078F DIAST BP <80 MM HG: CPT | Performed by: PSYCHIATRY & NEUROLOGY

## 2024-06-21 PROCEDURE — 1123F ACP DISCUSS/DSCN MKR DOCD: CPT | Performed by: PSYCHIATRY & NEUROLOGY

## 2024-06-21 PROCEDURE — 3074F SYST BP LT 130 MM HG: CPT | Performed by: PSYCHIATRY & NEUROLOGY

## 2024-06-21 PROCEDURE — 99213 OFFICE O/P EST LOW 20 MIN: CPT | Performed by: PSYCHIATRY & NEUROLOGY

## 2024-06-21 ASSESSMENT — ENCOUNTER SYMPTOMS
VOICE CHANGE: 1
COUGH: 0
ABDOMINAL PAIN: 0

## 2024-07-09 ENCOUNTER — OFFICE VISIT (OUTPATIENT)
Age: 74
End: 2024-07-09
Payer: MEDICARE

## 2024-07-09 DIAGNOSIS — M47.816 FACET ARTHROPATHY, LUMBAR: ICD-10-CM

## 2024-07-09 DIAGNOSIS — M51.36 DDD (DEGENERATIVE DISC DISEASE), LUMBAR: ICD-10-CM

## 2024-07-09 DIAGNOSIS — M43.16 SPONDYLOLISTHESIS OF LUMBAR REGION: ICD-10-CM

## 2024-07-09 DIAGNOSIS — M54.16 LUMBAR RADICULOPATHY: Primary | ICD-10-CM

## 2024-07-09 DIAGNOSIS — M54.50 LOW BACK PAIN, UNSPECIFIED BACK PAIN LATERALITY, UNSPECIFIED CHRONICITY, UNSPECIFIED WHETHER SCIATICA PRESENT: ICD-10-CM

## 2024-07-09 PROCEDURE — 1123F ACP DISCUSS/DSCN MKR DOCD: CPT | Performed by: PHYSICIAN ASSISTANT

## 2024-07-09 PROCEDURE — 99204 OFFICE O/P NEW MOD 45 MIN: CPT | Performed by: PHYSICIAN ASSISTANT

## 2024-07-09 RX ORDER — FUROSEMIDE 40 MG/1
40 TABLET ORAL DAILY PRN
COMMUNITY

## 2024-07-09 RX ORDER — METHYLPREDNISOLONE 4 MG/1
TABLET ORAL
Qty: 1 KIT | Refills: 0 | Status: SHIPPED | OUTPATIENT
Start: 2024-07-09

## 2024-07-09 RX ORDER — POTASSIUM CHLORIDE 20 MEQ/1
20 TABLET, EXTENDED RELEASE ORAL 2 TIMES DAILY
COMMUNITY

## 2024-07-09 NOTE — PROGRESS NOTES
symptoms.      - Physical Therapy was prescribed and will include stretching, strengthening and modalities to promote blood flow, flexibility and core strengthening.  - Oral Steroids: A short course of oral steroids was prescribed in an attempt to bring the acute radicular symptoms under control. The patient understands the risks and side effects of oral steroids including immunosuppression, hypertension, mood swings, increased blood sugar, including glaucoma. The steroid taper can be followed by NSAIDs once completed.  - A MRI was ordered to delineate anatomy, confirm the diagnosis and assess the severity. Further deliniation of anatomy will allow provider to recommend further appropriate treatment plan including lumbar steroid injections vs. surgery.   We are on sure if her pacemaker is MRI compatible.  We do have the card we will need to follow-up to see if this is compatible.  If it does not, she will need to undergo a myelogram CT scan    4 This is a chronic illness/condition with exacerbation and progression    Orders Placed This Encounter   Medications    methylPREDNISolone (MEDROL DOSEPACK) 4 MG tablet     Sig: Take by mouth as directed.     Dispense:  1 kit     Refill:  0        Orders Placed This Encounter   Procedures    XR LUMBAR SPINE (2-3 VIEWS)              No follow-ups on file.     Jessika Reeves PA-C  07/09/24      Elements of this note were created using speech recognition software.  As such, errors of speech recognition may be present.

## 2024-07-12 ENCOUNTER — HOSPITAL ENCOUNTER (OUTPATIENT)
Dept: MRI IMAGING | Age: 74
End: 2024-07-12
Payer: MEDICARE

## 2024-07-12 DIAGNOSIS — M54.50 LOW BACK PAIN, UNSPECIFIED BACK PAIN LATERALITY, UNSPECIFIED CHRONICITY, UNSPECIFIED WHETHER SCIATICA PRESENT: ICD-10-CM

## 2024-07-12 DIAGNOSIS — M47.816 FACET ARTHROPATHY, LUMBAR: ICD-10-CM

## 2024-07-12 DIAGNOSIS — M54.16 LUMBAR RADICULOPATHY: ICD-10-CM

## 2024-07-12 DIAGNOSIS — M51.36 DDD (DEGENERATIVE DISC DISEASE), LUMBAR: ICD-10-CM

## 2024-07-12 DIAGNOSIS — M43.16 SPONDYLOLISTHESIS OF LUMBAR REGION: ICD-10-CM

## 2024-07-12 PROCEDURE — 72148 MRI LUMBAR SPINE W/O DYE: CPT

## 2024-07-24 ENCOUNTER — HOSPITAL ENCOUNTER (OUTPATIENT)
Dept: PHYSICAL THERAPY | Age: 74
Setting detail: RECURRING SERIES
Discharge: HOME | End: 2024-07-27
Payer: MEDICARE

## 2024-07-24 DIAGNOSIS — M47.26 OTHER SPONDYLOSIS WITH RADICULOPATHY, LUMBAR REGION: Primary | ICD-10-CM

## 2024-07-24 DIAGNOSIS — G89.29 CHRONIC MIDLINE LOW BACK PAIN WITH RIGHT-SIDED SCIATICA: ICD-10-CM

## 2024-07-24 DIAGNOSIS — M54.41 CHRONIC MIDLINE LOW BACK PAIN WITH RIGHT-SIDED SCIATICA: ICD-10-CM

## 2024-07-24 DIAGNOSIS — R26.2 DIFFICULTY IN WALKING, NOT ELSEWHERE CLASSIFIED: ICD-10-CM

## 2024-07-24 PROCEDURE — 97110 THERAPEUTIC EXERCISES: CPT

## 2024-07-24 PROCEDURE — 97161 PT EVAL LOW COMPLEX 20 MIN: CPT

## 2024-07-24 NOTE — PROGRESS NOTES
painful spasm.       Treatment/Session Summary:    Treatment Assessment:   Pt seen for eval.  She will return 2x per week for additional treatment.  Communication/Consultation:  None today  Equipment provided today:  HEP  Recommendations/Intent for next treatment session: Next visit will focus on strengthening, stretching and pain management.    >Total Treatment Billable Duration:  45 minutes   Time In: 1100  Time Out: 1145    Alicia Hudson PT         Charge Capture  Events  [employer] Portal  Appt Desk  Attendance Report     Future Appointments   Date Time Provider Department Center   7/31/2024 11:00 AM Alicia Hudson, PT [facility]   8/2/2024 11:00 AM Alicia Hudson, PT [facility]   8/6/2024 11:00 AM Alicia Hudson, PT [facility]   8/8/2024 11:00 AM Alicia Hudson, PT [facility]   8/13/2024 11:00 AM Alicia Hudson, PT [facility]   8/15/2024 11:00 AM Alicia Hudson, PT [facility]   8/20/2024 11:00 AM Alicia Hudson, PT [facility]   8/22/2024 11:00 AM Alicia Hudson, PT [facility]   8/27/2024 10:15 AM Alicia Hudson, PT [facility]   8/29/2024 11:00 AM Alicia Hudson, PT [facility]   10/17/2024 11:00 AM Cirilo Allen MD [facility]   12/17/2024  4:00 PM Hesham Sunshine MD BS[facility]   5/29/2025  1:30 PM [facility] ULTRASOUND 1 [facility]   5/29/2025  2:30 PM Suzy Dominguez APRN - CNP [facility]

## 2024-07-24 NOTE — PLAN OF CARE
Anum Finn  : 1950  Primary: [employer] Medicare-advantage Ppo (Medicare Managed)  Secondary:  [facility] @ [address] [place] [address]  Phone: [phone]  Fax: [phone] Plan Frequency: 2x per week for 12 weeks    Plan of Care/Certification Expiration Date: 10/24/24        Plan of Care/Certification Expiration Date:  Plan of Care/Certification Expiration Date: 10/24/24    Frequency/Duration: Plan Frequency: 2x per week for 12 weeks      Time In/Out:   Time In: 1100  Time Out: 1145      PT Visit Info:         Visit Count:  1                OUTPATIENT PHYSICAL THERAPY:             Initial Assessment 2024               Episode (back pain)         Treatment Diagnosis:     Other spondylosis with radiculopathy, lumbar region  Difficulty in walking, not elsewhere classified  Chronic midline low back pain with right-sided sciatica  Medical/Referring Diagnosis:    Hip pain, right    Referring Physician:  Jt uDque MD MD Orders:  PT Eval and Treat   Return MD Appt:  as needed  Date of Onset:    23  Allergies:  Latex, Penicillins, Adhesive tape, and Codeine  Restrictions/Precautions:    None      Medications Last Reviewed:  2024     SUBJECTIVE   History of Injury/Illness (Reason for Referral):  Pt with history of lower back pain that is worsening.  She has increased difficulty with walking due to pain and feels pain in the right thigh along with some N/T here.  She has limited her ambulation and has become more sedentary due to the pain.  Patient Stated Goal(s):  \"to decrease pain, decrease numbness\"  Initial Pain Level:      9/10   Post Session Pain Level:     8/10  Past Medical History/Comorbidities:   Ms. Finn  has a past medical history of Adverse effect of anesthesia, Angina pectoris (HCC), Arthritis, CAD (coronary artery disease), Chest pain, unspecified, Coronary atherosclerosis of native coronary artery, Dyslipidemia,

## 2024-07-31 ENCOUNTER — HOSPITAL ENCOUNTER (OUTPATIENT)
Dept: PHYSICAL THERAPY | Age: 74
Setting detail: RECURRING SERIES
Discharge: HOME | End: 2024-08-03
Payer: MEDICARE

## 2024-07-31 PROCEDURE — 97110 THERAPEUTIC EXERCISES: CPT

## 2024-07-31 ASSESSMENT — PAIN SCALES - GENERAL: PAINLEVEL_OUTOF10: 5

## 2024-07-31 NOTE — PROGRESS NOTES
Anum Finn  : 1950  Primary: [employer] Medicare-advantage Ppo (Medicare Managed)  Secondary:  [facility] @ [facility]  [address] [place] [address]  Phone: [phone]  Fax: [phone] Plan Frequency: 2x per week for 12 weeks    Plan of Care/Certification Expiration Date: 10/24/24        Plan of Care/Certification Expiration Date:  Plan of Care/Certification Expiration Date: 10/24/24    Frequency/Duration:   Plan Frequency: 2x per week for 12 weeks      Time In/Out:   Time In: 1100  Time Out: 1140      PT Visit Info:         Visit Count:  2    OUTPATIENT PHYSICAL THERAPY:   Treatment Note 2024       Episode  (back pain)               Treatment Diagnosis:    Other spondylosis with radiculopathy, lumbar region  Difficulty in walking, not elsewhere classified  Chronic midline low back pain with right-sided sciatica  Medical/Referring Diagnosis:    Hip pain, right    Referring Physician:  Jt Duque MD MD Orders:  PT Eval and Treat   Return MD Appt:  as needed   Date of Onset:  No data recorded 23  Allergies:   Latex, Penicillins, Adhesive tape, and Codeine  Restrictions/Precautions:   None      Interventions Planned (Treatment may consist of any combination of the following):     See Assessment Note    Subjective Comments:   Pt reports pain is still there, but slightly better.  Initial Pain Level::     5/10  Post Session Pain Level:       4/10  Medications Last Reviewed:  2024  Updated Objective Findings:  None Today  Treatment   THERAPEUTIC EXERCISE: (40 minutes):    Exercises per grid below to improve mobility, strength, and balance.  Required minimal verbal cues to promote proper body mechanics.  Progressed resistance, range, and repetitions as indicated.  Nu step level 2 x 10  LTR x 20  Knee to chest B 4x hold 15 sec  Palloff press red TB x 20  Supine marching 2 x 20  Pelvic tilt x 20  Bridging-held  Hamstring stretching 3x hold 30 sec

## 2024-08-02 ENCOUNTER — HOSPITAL ENCOUNTER (OUTPATIENT)
Dept: PHYSICAL THERAPY | Age: 74
Setting detail: RECURRING SERIES
Discharge: HOME | End: 2024-08-05
Payer: MEDICARE

## 2024-08-02 PROCEDURE — 97110 THERAPEUTIC EXERCISES: CPT

## 2024-08-02 ASSESSMENT — PAIN SCALES - GENERAL: PAINLEVEL_OUTOF10: 4

## 2024-08-02 NOTE — PROGRESS NOTES
Anum Finn  : 1950  Primary: [employer] Medicare-advantage Ppo (Medicare Managed)  Secondary:  [facility] @ [facility]  [address] [place] [address]  Phone: [phone]  Fax: [phone] Plan Frequency: 2x per week for 12 weeks    Plan of Care/Certification Expiration Date: 10/24/24        Plan of Care/Certification Expiration Date:  Plan of Care/Certification Expiration Date: 10/24/24    Frequency/Duration:   Plan Frequency: 2x per week for 12 weeks      Time In/Out:   Time In: 1100  Time Out: 1145      PT Visit Info:         Visit Count:  3    OUTPATIENT PHYSICAL THERAPY:   Treatment Note 2024       Episode  (back pain)               Treatment Diagnosis:    Other spondylosis with radiculopathy, lumbar region  Difficulty in walking, not elsewhere classified  Chronic midline low back pain with right-sided sciatica  Medical/Referring Diagnosis:    Hip pain, right    Referring Physician:  Jt Duque MD MD Orders:  PT Eval and Treat   Return MD Appt:  as needed   Date of Onset:  No data recorded 23  Allergies:   Latex, Penicillins, Adhesive tape, and Codeine  Restrictions/Precautions:   None      Interventions Planned (Treatment may consist of any combination of the following):     See Assessment Note    Subjective Comments:   Pt reports she was very sore post last treatment.  Initial Pain Level::     4/10  Post Session Pain Level:       3/10  Medications Last Reviewed:  2024  Updated Objective Findings:  None Today  Treatment   THERAPEUTIC EXERCISE: (45 minutes):    Exercises per grid below to improve mobility, strength, and balance.  Required minimal verbal cues to promote proper body mechanics.  Progressed resistance, range, and repetitions as indicated.  Nu step level 2 x 10  LTR x 20  Knee to chest B 4x hold 15 sec  Palloff press red TB x 20  Supine marching 2 x 20  Pelvic tilt x 20  Bridging-held  Hamstring stretching 3x hold 30 sec

## 2024-08-06 ENCOUNTER — HOSPITAL ENCOUNTER (OUTPATIENT)
Dept: PHYSICAL THERAPY | Age: 74
Setting detail: RECURRING SERIES
Discharge: HOME | End: 2024-08-09
Payer: MEDICARE

## 2024-08-06 PROCEDURE — 97110 THERAPEUTIC EXERCISES: CPT

## 2024-08-06 ASSESSMENT — PAIN SCALES - GENERAL: PAINLEVEL_OUTOF10: 4

## 2024-08-06 NOTE — PROGRESS NOTES
Anum Finn  : 1950  Primary: [employer] Medicare-advantage Ppo (Medicare Managed)  Secondary:  [facility]  [address] [facility] [address] [facility] [place] [address]  Phone: [phone]  Fax: [phone] Plan Frequency: 2x per week for 12 weeks    Plan of Care/Certification Expiration Date: 10/24/24        Plan of Care/Certification Expiration Date:  Plan of Care/Certification Expiration Date: 10/24/24    Frequency/Duration:   Plan Frequency: 2x per week for 12 weeks      Time In/Out:   Time In: 1105  Time Out: 1155      PT Visit Info:         Visit Count:  4    OUTPATIENT PHYSICAL THERAPY:   Treatment Note 2024       Episode  (back pain)               Treatment Diagnosis:    Other spondylosis with radiculopathy, lumbar region  Difficulty in walking, not elsewhere classified  Chronic midline low back pain with right-sided sciatica  Medical/Referring Diagnosis:    Hip pain, right    Referring Physician:  Jt Duque MD MD Orders:  PT Eval and Treat   Return MD Appt:  as needed   Date of Onset:  No data recorded 23  Allergies:   Latex, Penicillins, Adhesive tape, and Codeine  Restrictions/Precautions:   None      Interventions Planned (Treatment may consist of any combination of the following):     See Assessment Note    Subjective Comments:   Pt reports she was still very sore post treatment, but back does feel better.  Initial Pain Level::     4/10  Post Session Pain Level:       3/10  Medications Last Reviewed:  2024  Updated Objective Findings:  None Today  Treatment   THERAPEUTIC EXERCISE: (50 minutes):    Exercises per grid below to improve mobility, strength, and balance.  Required minimal verbal cues to promote proper body mechanics.  Progressed resistance, range, and repetitions as indicated.  Nu step level 2 x 10  LTR x 20  Knee to chest B 4x hold 15 sec  Palloff press red TB x 20  Supine marching 2 x 20  Pelvic tilt x 20  Bridging-held  Hamstring

## 2024-08-08 ENCOUNTER — APPOINTMENT (OUTPATIENT)
Dept: PHYSICAL THERAPY | Age: 74
End: 2024-08-08
Payer: MEDICARE

## 2024-08-13 ENCOUNTER — APPOINTMENT (OUTPATIENT)
Dept: PHYSICAL THERAPY | Age: 74
End: 2024-08-13
Payer: MEDICARE

## 2024-08-15 ENCOUNTER — APPOINTMENT (OUTPATIENT)
Dept: PHYSICAL THERAPY | Age: 74
End: 2024-08-15
Payer: MEDICARE

## 2024-08-19 ENCOUNTER — TELEPHONE (OUTPATIENT)
Age: 74
End: 2024-08-19

## 2024-08-20 ENCOUNTER — APPOINTMENT (OUTPATIENT)
Dept: PHYSICAL THERAPY | Age: 74
End: 2024-08-20
Payer: MEDICARE

## 2024-08-22 ENCOUNTER — HOSPITAL ENCOUNTER (OUTPATIENT)
Dept: PHYSICAL THERAPY | Age: 74
Setting detail: RECURRING SERIES
Discharge: HOME | End: 2024-08-25
Payer: MEDICARE

## 2024-08-22 PROCEDURE — 97110 THERAPEUTIC EXERCISES: CPT

## 2024-08-22 ASSESSMENT — PAIN SCALES - GENERAL: PAINLEVEL_OUTOF10: 3

## 2024-08-22 NOTE — PROGRESS NOTES
Anum Finn  : 1950  Primary: [employer] Medicare-advantage Ppo (Medicare Managed)  Secondary:  [facility]  [address] [facility] [place] [address]  Phone: [phone]  Fax: [phone] Plan Frequency: 2x per week for 12 weeks    Plan of Care/Certification Expiration Date: 10/24/24        Plan of Care/Certification Expiration Date:  Plan of Care/Certification Expiration Date: 10/24/24    Frequency/Duration:   Plan Frequency: 2x per week for 12 weeks      Time In/Out:   Time In: 1100  Time Out: 1140      PT Visit Info:         Visit Count:  5    OUTPATIENT PHYSICAL THERAPY:   Treatment Note 2024       Episode  (back pain)               Treatment Diagnosis:    Other spondylosis with radiculopathy, lumbar region  Difficulty in walking, not elsewhere classified  Chronic midline low back pain with right-sided sciatica  Medical/Referring Diagnosis:    Hip pain, right    Referring Physician:  Jt Duque MD MD Orders:  PT Eval and Treat   Return MD Appt:  as needed   Date of Onset:  No data recorded 23  Allergies:   Latex, Penicillins, Adhesive tape, and Codeine  Restrictions/Precautions:   None      Interventions Planned (Treatment may consist of any combination of the following):     See Assessment Note    Subjective Comments:   Pt reports she is taking new medication, Prozac, and it is making her feel unsteady at times.  Initial Pain Level::     3/10  Post Session Pain Level:       3/10  Medications Last Reviewed:  2024  Updated Objective Findings:  None Today  Treatment   THERAPEUTIC EXERCISE: (40 minutes):    Exercises per grid below to improve mobility, strength, and balance.  Required minimal verbal cues to promote proper body mechanics.  Progressed resistance, range, and repetitions as indicated.  Nu step level 2 x 10  LTR x 20  Knee to chest B 4x hold 15 sec-held  Palloff press red TB x 20  Supine marching 2 x 20  Pelvic tilt x

## 2024-08-27 ENCOUNTER — TELEPHONE (OUTPATIENT)
Dept: NEUROLOGY | Age: 74
End: 2024-08-27

## 2024-08-27 NOTE — TELEPHONE ENCOUNTER
Patient is requesting to be seen as soon as possible. Her gait has worsened and she cannot walk by herself.

## 2024-08-28 ENCOUNTER — OFFICE VISIT (OUTPATIENT)
Dept: NEUROLOGY | Age: 74
End: 2024-08-28
Payer: MEDICARE

## 2024-08-28 VITALS
HEART RATE: 68 BPM | HEIGHT: 60 IN | SYSTOLIC BLOOD PRESSURE: 102 MMHG | BODY MASS INDEX: 36.52 KG/M2 | DIASTOLIC BLOOD PRESSURE: 70 MMHG | WEIGHT: 186 LBS

## 2024-08-28 DIAGNOSIS — M48.062 SPINAL STENOSIS OF LUMBAR REGION WITH NEUROGENIC CLAUDICATION: ICD-10-CM

## 2024-08-28 DIAGNOSIS — R26.89 ABNORMALITY OF GAIT DUE TO IMPAIRMENT OF BALANCE: ICD-10-CM

## 2024-08-28 DIAGNOSIS — G25.0 ESSENTIAL TREMOR: Primary | ICD-10-CM

## 2024-08-28 DIAGNOSIS — I95.1 ORTHOSTATIC HYPOTENSION: ICD-10-CM

## 2024-08-28 PROCEDURE — 99214 OFFICE O/P EST MOD 30 MIN: CPT | Performed by: PSYCHIATRY & NEUROLOGY

## 2024-08-28 PROCEDURE — 3074F SYST BP LT 130 MM HG: CPT | Performed by: PSYCHIATRY & NEUROLOGY

## 2024-08-28 PROCEDURE — 1123F ACP DISCUSS/DSCN MKR DOCD: CPT | Performed by: PSYCHIATRY & NEUROLOGY

## 2024-08-28 PROCEDURE — 3078F DIAST BP <80 MM HG: CPT | Performed by: PSYCHIATRY & NEUROLOGY

## 2024-08-28 ASSESSMENT — ENCOUNTER SYMPTOMS
COUGH: 0
ABDOMINAL PAIN: 0
VOICE CHANGE: 1

## 2024-08-28 NOTE — PROGRESS NOTES
[facility] NEUROLOGY  [address], [address]  [place] [address]  Phone: [phone] Fax [phone]  Hesham Sunshine MD      Patient: Anum Finn  Provider: Hesham Sunshine MD    CC:   Chief Complaint   Patient presents with    Follow-up    Tremors     Referring Provider:    History of Present Illness:     Anum Finn is a 74 y.o. RH female who presents for follow up of tremor.     She is accompanied by her daughter and granddaughter.  She was last seen June 2024.     Patient presents today for follow-up and continued management of tremors of the hands which have been best described as a postural and action tremor most consistent with benign essential tremor.  There continues to be no evidence of parkinsonism.    Current medications include (but not limited to):  Propranolol 20 mg twice a day  Lexapro 20 mg daily (no longer taking)    Previous medication trials include: N/A    Patient presents today for an urgent follow-up regarding a gait disturbance.    She is seen today ambulating very slowly with the assistance of a rollator walker.  She notes postural lightheadedness and dizziness with worsening tremulousness of the legs when standing.  Previous concerns have been reported by her other physicians for orthostatic hypotension. She denies any syncopal episodes but her mobility has been impaired within the last month.    In addition, she has chronic low back pain, previously followed by [facility].  MRI of the lumbar spine shows a prominent disc bulge at L4-L5 with moderate central canal stenosis and impingement upon the left L5 nerve root.  She has been working with physical therapy for her low back pain, but has been unable to participate within the last couple weeks because of the worsening dizziness.    She remains on propranolol 20 mg twice a day for an essential tremor that has mostly affected the hands.  She has remained relatively stable on this dose.  She does note some  by mouth daily      potassium gluconate 550 mg tablet Take 595 mg by mouth daily      [DISCONTINUED] propranolol (INDERAL) 20 MG tablet Take 1 tablet by mouth 2 times daily 60 tablet 2     No facility-administered encounter medications on file as of 8/28/2024.     ALLERGIES:  Allergies   Allergen Reactions    Latex Rash     Other reaction(s): Other (see comments)  Added based on information entered during case entry, please review and add reactions, type, and severity as needed      Penicillins Hives     Has taken penicillin without any reactions - states she is no longer allergic     Adhesive Tape Rash     Paper tape ok    Codeine Nausea And Vomiting     Other Reaction(s): GI intolerance       Physical Exam:     /70   Pulse 68   Ht 1.524 m (5')   Wt 84.4 kg (186 lb)   BMI 36.33 kg/m²     General Exam:  General: Well developed, well nourished, in no apparent distress.   HEENT: Normocephalic, atraumatic. Sclera anicteric. Oropharynx clear.   Neck: Supple without masses  Cardiovascular: Regular rate and rhythm. No carotid bruits.   Lungs: Non-labored breathing.   Abdomen: Soft, nontender, nondistended.   Extremities: Peripheral pulses intact. No edema and no rashes.     Neurological Exam:     MS/Language/Speech:  Alert. Oriented to person, place, and time. Language fluent.  Slight vocal tremor.    Cranial Nerves: PERRL. Eye movements full with normal pursuits. No nystagmus. Face was symmetric with good activation and normal sensation. Tongue and palate were midline. Shoulder shrug symmetric.    Motor: Strength was full in all proximal and distal muscle groups. Tone was normal.     Abnormal Movements: No tremor at rest.  There is a mild postural tremor of the hands bilaterally.  Mild to moderate action tremor with finger-nose-finger bilaterally.  Handwriting is shaky and poorly legible. Archimedes spirals show mild evidence of tremor bilaterally.  Rapid alternating movements show no bradykinesia.  No dystonic

## 2024-08-29 ENCOUNTER — HOSPITAL ENCOUNTER (OUTPATIENT)
Dept: PHYSICAL THERAPY | Age: 74
Setting detail: RECURRING SERIES
End: 2024-08-29
Payer: MEDICARE

## 2024-08-29 NOTE — PROGRESS NOTES
Anum Finn  : 1950  Primary: [employer] Medicare-advantage Ppo  Secondary:  [facility]  [address] [facility] [place] [address]  Phone: [phone]  Fax: [phone]    PT Visit Info:    Progress Note Counter: 3     OT Visit Info:  No data recorded    OUTPATIENT THERAPY: 2024  Episode  Appt Desk        Anum Finn did not show for her appointment for today.  Will plan to follow up next during next appointment.      [x]  Attempted to call patient    Thank you,  Alicia Hudson, PT    Future Appointments   Date Time Provider Department Center   10/17/2024 11:00 AM Cirilo Allen MD [facility] AMB   2024  4:00 PM Hesham Sunshine MD [facility] AM[facility]   2025  1:30 PM [facility] ULTRASOUND 1 [facility]   2025  2:30 PM Suzy Dominguez APRN - CNP [facility] AM[facility]

## 2024-09-05 PROCEDURE — 93296 REM INTERROG EVL PM/IDS: CPT | Performed by: INTERNAL MEDICINE

## 2024-09-05 PROCEDURE — 93294 REM INTERROG EVL PM/LDLS PM: CPT | Performed by: INTERNAL MEDICINE

## 2024-10-24 ENCOUNTER — OFFICE VISIT (OUTPATIENT)
Age: 74
End: 2024-10-24
Payer: MEDICARE

## 2024-10-24 VITALS
SYSTOLIC BLOOD PRESSURE: 110 MMHG | DIASTOLIC BLOOD PRESSURE: 82 MMHG | WEIGHT: 181 LBS | HEIGHT: 60 IN | HEART RATE: 88 BPM | BODY MASS INDEX: 35.53 KG/M2

## 2024-10-24 DIAGNOSIS — Z95.0 CARDIAC PACEMAKER IN SITU: Primary | ICD-10-CM

## 2024-10-24 DIAGNOSIS — R06.09 DOE (DYSPNEA ON EXERTION): ICD-10-CM

## 2024-10-24 DIAGNOSIS — Z72.0 TOBACCO ABUSE: ICD-10-CM

## 2024-10-24 DIAGNOSIS — I25.10 ASCVD (ARTERIOSCLEROTIC CARDIOVASCULAR DISEASE): ICD-10-CM

## 2024-10-24 PROCEDURE — 1123F ACP DISCUSS/DSCN MKR DOCD: CPT | Performed by: INTERNAL MEDICINE

## 2024-10-24 PROCEDURE — 99214 OFFICE O/P EST MOD 30 MIN: CPT | Performed by: INTERNAL MEDICINE

## 2024-10-24 PROCEDURE — 1126F AMNT PAIN NOTED NONE PRSNT: CPT | Performed by: INTERNAL MEDICINE

## 2024-10-24 PROCEDURE — 3079F DIAST BP 80-89 MM HG: CPT | Performed by: INTERNAL MEDICINE

## 2024-10-24 PROCEDURE — 3074F SYST BP LT 130 MM HG: CPT | Performed by: INTERNAL MEDICINE

## 2024-10-24 RX ORDER — MIDODRINE HYDROCHLORIDE 2.5 MG/1
2.5 TABLET ORAL 3 TIMES DAILY
COMMUNITY

## 2024-10-24 RX ORDER — SERTRALINE HYDROCHLORIDE 25 MG/1
25 TABLET, FILM COATED ORAL DAILY
COMMUNITY

## 2024-10-24 NOTE — PROGRESS NOTES
[address], [address]  [place] [address]  PHONE: [phone]    SUBJECTIVE:   Anum Finn is a 74 y.o. female 1950   seen for a follow up visit regarding the following:     Chief Complaint   Patient presents with    Coronary Artery Disease    Follow-up         History of Present Illness  The patient is a 74-year-old female who presents for evaluation of multiple medical concerns.    She has been experiencing depression for several years, which worsened in August and September of 2024. Her physician, Dr. Mireles, transitioned her from Lexapro to Prozac, but the latter was ineffective even at a reduced dose. She is currently on Zoloft 25 mg, which appears to be beneficial. She also takes Wellbutrin 300 mg. She reports that her depression is exacerbated by her living situation with her .    She was previously on propranolol for tremors, but it was discontinued due to potential orthostatic hypotension. She reports that this change has improved her condition, although she still experiences severe tremors. She is now able to walk with a cane, having previously required a walker.    She has a pacemaker in place and reports no chest discomfort. She is currently taking aspirin 81 mg and Lipitor 80 mg.    She experienced low blood pressure, which was managed with midodrine 2.5 mg twice daily as needed. She typically takes it once in the morning and occasionally in the afternoon if necessary. Her blood pressure has been stable today, with readings in the 80s and 60s.    She is on Lasix 40 mg as needed and potassium twice a day.    SOCIAL HISTORY  The patient lives in a [facility] community.        Interval history:    She was referred for permanent pacemaker placement underwent [place] dual-chamber pacemaker placement 1/26/2023 additional history of tremor.  She has a history of multivessel coronary artery disease her last cardiac catheterization was in 2012

## 2024-10-24 NOTE — PATIENT INSTRUCTIONS
Drink 250-500 ml of water quickly over a period of 3-4 minutes. Do this first thing upon waking up if symptoms occur when getting out of bed or in the morning  Minimize or avoid drinking alcohol  Stand up slowly and stand still when feeling dizzy or lightheaded  Perform handgrip exercise by placing palms together and pressing together for 20 seconds prior to standing  Avoid standing still or laying in a flat position for long periods  Avoid too much exposure to hot environments, such as hot baths, saunas, etc  Elevate the head of the bed when lying down ¾ try using a wedge under the head of the bed  Increase the amount of salt in the diet (if high blood pressure is not a problem)  Eat smaller, more frequent meals  Wear elastic compression stockings or abdominal binders. It is important that compression stockings go all the way up the leg to the hip or over the abdomen

## 2024-11-19 ENCOUNTER — HOSPITAL ENCOUNTER (OUTPATIENT)
Dept: MAMMOGRAPHY | Age: 74
Discharge: HOME | End: 2024-11-22
Payer: MEDICARE

## 2024-11-19 DIAGNOSIS — Z12.31 ENCOUNTER FOR SCREENING MAMMOGRAM FOR BREAST CANCER: ICD-10-CM

## 2024-11-19 PROCEDURE — 77063 BREAST TOMOSYNTHESIS BI: CPT

## 2024-12-17 ENCOUNTER — OFFICE VISIT (OUTPATIENT)
Dept: NEUROLOGY | Age: 74
End: 2024-12-17
Payer: MEDICARE

## 2024-12-17 VITALS
BODY MASS INDEX: 35.35 KG/M2 | HEIGHT: 60 IN | HEART RATE: 68 BPM | DIASTOLIC BLOOD PRESSURE: 66 MMHG | SYSTOLIC BLOOD PRESSURE: 93 MMHG

## 2024-12-17 DIAGNOSIS — G25.0 ESSENTIAL TREMOR: Primary | ICD-10-CM

## 2024-12-17 DIAGNOSIS — R26.89 ABNORMALITY OF GAIT DUE TO IMPAIRMENT OF BALANCE: ICD-10-CM

## 2024-12-17 DIAGNOSIS — I95.1 ORTHOSTATIC HYPOTENSION: ICD-10-CM

## 2024-12-17 PROCEDURE — 99213 OFFICE O/P EST LOW 20 MIN: CPT | Performed by: PSYCHIATRY & NEUROLOGY

## 2024-12-17 PROCEDURE — 3078F DIAST BP <80 MM HG: CPT | Performed by: PSYCHIATRY & NEUROLOGY

## 2024-12-17 PROCEDURE — 1159F MED LIST DOCD IN RCRD: CPT | Performed by: PSYCHIATRY & NEUROLOGY

## 2024-12-17 PROCEDURE — 3074F SYST BP LT 130 MM HG: CPT | Performed by: PSYCHIATRY & NEUROLOGY

## 2024-12-17 PROCEDURE — 1123F ACP DISCUSS/DSCN MKR DOCD: CPT | Performed by: PSYCHIATRY & NEUROLOGY

## 2024-12-17 ASSESSMENT — ENCOUNTER SYMPTOMS
COUGH: 0
VOICE CHANGE: 1
ABDOMINAL PAIN: 0

## 2024-12-17 NOTE — PROGRESS NOTES
movements show no bradykinesia.  No dystonic posturing is noted.    Sensory: Normal to light touch throughout.    Cerebellar: No ataxia or dysmetria.      Reflexes (R/L): Biceps (2+/2+), Brachioradialis (1+/1+), Patellar (2+/2+), Ankle (1+/1+). Bishop's was negative.      Gait: She is slow to rise from a seated position. She is currently ambulating with the assistance of a cane.  Her gait exam does look improved though she does exhibit a somewhat slowed and mildly unsteady gait. No parkinsonian features are noted.       Assessment and Plan:     Anum Finn is a 74 y.o. female who presents with the following issues:     Anum was seen today for follow-up and tremors.    Diagnoses and all orders for this visit:    Essential tremor    Orthostatic hypotension    Abnormality of gait due to impairment of balance      Patient presents today for follow-up of benign essential tremor complicated by orthostatic hypotension, exacerbated with use of propranolol.    Essential tremor:  Propranolol has been discontinued.  We have discussed other options for tremor management but patient has declined for now.  In the future, low-dose trials of primidone or topiramate could be considered.    Orthostatic hypotension:  Discussed other conservative measures such as adequate hydration, rising slowly from a seated position, etc.    She remains on midodrine as prescribed but discussed that dosage increases could likely be considered.  She is also followed by [facility] cardiology.    Gait/balance impairment:  She will continue use of her cane for fall prevention.       Follow up is arranged.      Signature: Hesham Sunshine MD      Date:  12/18/2024    [address], [place] [address]  Ph: [phone]  Fax: [phone]         I have personally interviewed and examined Mrs. Anum Finn and I have personally reviewed all relevant records including labs and imaging as noted above. I have

## 2025-03-26 RX ORDER — ATORVASTATIN CALCIUM 80 MG/1
80 TABLET, FILM COATED ORAL DAILY
Qty: 90 TABLET | Refills: 3 | Status: SHIPPED | OUTPATIENT
Start: 2025-03-26

## 2025-04-24 ENCOUNTER — OFFICE VISIT (OUTPATIENT)
Age: 75
End: 2025-04-24
Payer: MEDICARE

## 2025-04-24 ENCOUNTER — CLINICAL SUPPORT (OUTPATIENT)
Age: 75
End: 2025-04-24

## 2025-04-24 VITALS
HEART RATE: 86 BPM | BODY MASS INDEX: 35.34 KG/M2 | DIASTOLIC BLOOD PRESSURE: 76 MMHG | SYSTOLIC BLOOD PRESSURE: 118 MMHG | HEIGHT: 60 IN | OXYGEN SATURATION: 94 % | WEIGHT: 180 LBS

## 2025-04-24 DIAGNOSIS — E66.01 MORBID (SEVERE) OBESITY DUE TO EXCESS CALORIES (HCC): ICD-10-CM

## 2025-04-24 DIAGNOSIS — R00.1 BRADYCARDIA: Primary | ICD-10-CM

## 2025-04-24 DIAGNOSIS — I25.10 ASCVD (ARTERIOSCLEROTIC CARDIOVASCULAR DISEASE): ICD-10-CM

## 2025-04-24 DIAGNOSIS — I34.0 MITRAL VALVE INSUFFICIENCY, UNSPECIFIED ETIOLOGY: ICD-10-CM

## 2025-04-24 DIAGNOSIS — Z95.0 CARDIAC PACEMAKER IN SITU: Primary | ICD-10-CM

## 2025-04-24 DIAGNOSIS — E78.5 HYPERLIPIDEMIA, UNSPECIFIED HYPERLIPIDEMIA TYPE: ICD-10-CM

## 2025-04-24 DIAGNOSIS — R42 DIZZINESS: ICD-10-CM

## 2025-04-24 PROCEDURE — 1123F ACP DISCUSS/DSCN MKR DOCD: CPT | Performed by: INTERNAL MEDICINE

## 2025-04-24 PROCEDURE — 99214 OFFICE O/P EST MOD 30 MIN: CPT | Performed by: INTERNAL MEDICINE

## 2025-04-24 PROCEDURE — 3074F SYST BP LT 130 MM HG: CPT | Performed by: INTERNAL MEDICINE

## 2025-04-24 PROCEDURE — 1159F MED LIST DOCD IN RCRD: CPT | Performed by: INTERNAL MEDICINE

## 2025-04-24 PROCEDURE — 3078F DIAST BP <80 MM HG: CPT | Performed by: INTERNAL MEDICINE

## 2025-04-24 RX ORDER — ALBUTEROL SULFATE 90 UG/1
INHALANT RESPIRATORY (INHALATION)
COMMUNITY
Start: 2025-02-11

## 2025-04-24 NOTE — PROGRESS NOTES
[address], [address]  [place] [address]  PHONE: [phone]    SUBJECTIVE:   Anum Finn is a 75 y.o. female 1950   seen for a follow up visit regarding the following:     Chief Complaint   Patient presents with    pace maker check    Hypertension         History of Present Illness  The patient is a 75-year-old individual with a pacemaker in situ, a history of atherosclerotic cardiovascular disease (ASCVD), tobacco use disorder, hyperlipidemia, orthostatic intolerance, and depression.    The patient reports a general sense of well-being but expresses a desire to improve endurance. They utilize a cane for ambulation and have not experienced any hospitalizations or significant health issues since October 2024. The patient suspects they had a COVID-19 infection in February 2025, which impacted their respiratory health, although no diagnostic testing was performed. They received the influenza vaccine in February 2025.    The patient's dyspnea remains unchanged since the last visit. Persistent dizziness necessitates the use of a cane. They report swelling and erythema in one foot, with discoloration of the toes. The patient is currently taking midodrine twice daily.    The patient engages in physical activity independently. Their daughter has acquired a smaller exercise bicycle, which has not yet been utilized. A tricycle bike is available, but the patient feels unstable when using it outdoors. Physical therapy was discontinued following falls in October 2024.    The patient has abstained from smoking for several years.    Depressive symptoms have improved with the administration of sertraline (Zoloft) and bupropion (Wellbutrin).    PAST SURGICAL HISTORY:  Coronary artery bypass grafting (CABG) in May 2006.  Stent placement in 2012.    SOCIAL HISTORY  - Exercise: Uses stationary exercise bicycle at home, has a three-gramajo bike but feels unstable outdoors  -

## 2025-05-27 ENCOUNTER — RESULTS FOLLOW-UP (OUTPATIENT)
Age: 75
End: 2025-05-27

## 2025-05-27 NOTE — RESULT ENCOUNTER NOTE
Your most recent carotid study shows no major carotid disease.    Please let me know if you have additional questions or concerns.    Cirilo Allen MD

## 2025-05-29 ENCOUNTER — OFFICE VISIT (OUTPATIENT)
Dept: VASCULAR SURGERY | Age: 75
End: 2025-05-29
Payer: MEDICARE

## 2025-05-29 VITALS
OXYGEN SATURATION: 90 % | SYSTOLIC BLOOD PRESSURE: 115 MMHG | HEART RATE: 74 BPM | BODY MASS INDEX: 34.95 KG/M2 | WEIGHT: 178 LBS | DIASTOLIC BLOOD PRESSURE: 79 MMHG | HEIGHT: 60 IN

## 2025-05-29 DIAGNOSIS — I77.1 ILIAC ARTERY STENOSIS, BILATERAL: ICD-10-CM

## 2025-05-29 DIAGNOSIS — Z98.62 S/P PERIPHERAL ARTERY ANGIOPLASTY: ICD-10-CM

## 2025-05-29 DIAGNOSIS — I73.9 PAD (PERIPHERAL ARTERY DISEASE): Primary | ICD-10-CM

## 2025-05-29 PROCEDURE — 1123F ACP DISCUSS/DSCN MKR DOCD: CPT | Performed by: NURSE PRACTITIONER

## 2025-05-29 PROCEDURE — 1159F MED LIST DOCD IN RCRD: CPT | Performed by: NURSE PRACTITIONER

## 2025-05-29 PROCEDURE — 99213 OFFICE O/P EST LOW 20 MIN: CPT | Performed by: NURSE PRACTITIONER

## 2025-05-29 PROCEDURE — 3074F SYST BP LT 130 MM HG: CPT | Performed by: NURSE PRACTITIONER

## 2025-05-29 PROCEDURE — 3078F DIAST BP <80 MM HG: CPT | Performed by: NURSE PRACTITIONER

## 2025-05-29 PROCEDURE — G2211 COMPLEX E/M VISIT ADD ON: HCPCS | Performed by: NURSE PRACTITIONER

## 2025-05-30 NOTE — PROGRESS NOTES
DATE OF VISIT: 5/29/2025      hospitals  Anum Finn is a 75 y.o. female is here in follow up for her lower extremity duplex study.  She denies any lifestyle limiting claudication, rest pain or new open ulcerations.  She remains on aspirin and atorvastatin. She has undergone bilateral iliac stenting. She does report some leg cramps especially at night.        MEDICAL HISTORY:   Past Medical History:   Diagnosis Date    Adverse effect of anesthesia     hard to wake after    Angina pectoris 10/13/2015    Arthritis     hands & hips    CAD (coronary artery disease)     5/06 MI and 4 vessel CABG, patient states she has angina pain-last time takingNTG 2 week ago    Chest pain, unspecified 05/27/2011    Coronary atherosclerosis of native coronary artery 10/13/2015    Diabetes mellitus (HCC)     Dyslipidemia 05/27/2011    Dyspnea 10/13/2015    Essential tremor 09/27/2022    Extremity atherosclerosis with intermittent claudication 10/13/2015    GERD (gastroesophageal reflux disease)     tums    Heart failure (HCC)     Hx of CABG 05/27/2011    LIMA and 3 SVG. 2 OMs/PLV and one acute marginal of RCA     Hypertension     LVH (left ventricular hypertrophy) 10/13/2015    MI, old     Peripheral arterial disease 06/29/2012    Pleural effusion 10/13/2015    Psychiatric disorder     S/P coronary artery stent placement 05/27/2011    2.0 x 15mm Xience to OM2-5/27/11     S/P peripheral artery angioplasty 06/29/2012    Severe obesity (BMI 35.0-39.9) 07/19/2018    Sleep apnea     does not wear cpap- weight loss    Tobacco use disorder 10/13/2015    Tremor     Unspecified adverse effect of anesthesia     slow to wake up         SURGICAL HISTORY:   Past Surgical History:   Procedure Laterality Date    APPENDECTOMY      BREAST SURGERY      biopsy left    CARDIAC CATHETERIZATION      CARDIAC PACEMAKER PLACEMENT      CHOLECYSTECTOMY      08/01/09    EP DEVICE PROCEDURE N/A 01/26/2023    INSERT PPM DUAL performed by Michael Fernandez MD

## 2025-06-04 ENCOUNTER — PATIENT MESSAGE (OUTPATIENT)
Age: 75
End: 2025-06-04

## 2025-06-05 PROCEDURE — 93294 REM INTERROG EVL PM/LDLS PM: CPT | Performed by: INTERNAL MEDICINE

## 2025-06-05 PROCEDURE — 93296 REM INTERROG EVL PM/IDS: CPT | Performed by: INTERNAL MEDICINE

## 2025-06-11 NOTE — TELEPHONE ENCOUNTER
Cirilo Allen MD to Me (Selected Message)      6/11/25  7:28 AM  Note     There is a statin vacation protocol where patients are advised to stop taking medication for 3 weeks.  Please advise her to stop her statin for 3 weeks and report back symptoms if any.

## 2025-07-09 ENCOUNTER — OFFICE VISIT (OUTPATIENT)
Dept: NEUROLOGY | Age: 75
End: 2025-07-09
Payer: MEDICARE

## 2025-07-09 VITALS
WEIGHT: 181 LBS | HEART RATE: 79 BPM | DIASTOLIC BLOOD PRESSURE: 78 MMHG | OXYGEN SATURATION: 92 % | BODY MASS INDEX: 35.35 KG/M2 | SYSTOLIC BLOOD PRESSURE: 109 MMHG

## 2025-07-09 DIAGNOSIS — Z91.81 HISTORY OF FALL: ICD-10-CM

## 2025-07-09 DIAGNOSIS — G25.0 ESSENTIAL TREMOR: Primary | ICD-10-CM

## 2025-07-09 DIAGNOSIS — R26.89 ABNORMALITY OF GAIT DUE TO IMPAIRMENT OF BALANCE: ICD-10-CM

## 2025-07-09 PROCEDURE — 3074F SYST BP LT 130 MM HG: CPT | Performed by: NURSE PRACTITIONER

## 2025-07-09 PROCEDURE — 99214 OFFICE O/P EST MOD 30 MIN: CPT | Performed by: NURSE PRACTITIONER

## 2025-07-09 PROCEDURE — 1160F RVW MEDS BY RX/DR IN RCRD: CPT | Performed by: NURSE PRACTITIONER

## 2025-07-09 PROCEDURE — 1159F MED LIST DOCD IN RCRD: CPT | Performed by: NURSE PRACTITIONER

## 2025-07-09 PROCEDURE — 1123F ACP DISCUSS/DSCN MKR DOCD: CPT | Performed by: NURSE PRACTITIONER

## 2025-07-09 PROCEDURE — 3078F DIAST BP <80 MM HG: CPT | Performed by: NURSE PRACTITIONER

## 2025-07-09 RX ORDER — PRIMIDONE 50 MG/1
50 TABLET ORAL NIGHTLY
Qty: 90 TABLET | Refills: 0 | Status: SHIPPED | OUTPATIENT
Start: 2025-07-09

## 2025-07-09 ASSESSMENT — PATIENT HEALTH QUESTIONNAIRE - PHQ9
SUM OF ALL RESPONSES TO PHQ QUESTIONS 1-9: 2
DEPRESSION UNABLE TO ASSESS: FUNCTIONAL CAPACITY MOTIVATION LIMITS ACCURACY
SUM OF ALL RESPONSES TO PHQ QUESTIONS 1-9: 2
SUM OF ALL RESPONSES TO PHQ QUESTIONS 1-9: 2
2. FEELING DOWN, DEPRESSED OR HOPELESS: SEVERAL DAYS
1. LITTLE INTEREST OR PLEASURE IN DOING THINGS: SEVERAL DAYS
SUM OF ALL RESPONSES TO PHQ QUESTIONS 1-9: 2

## 2025-07-09 ASSESSMENT — ENCOUNTER SYMPTOMS
EYE PAIN: 0
SHORTNESS OF BREATH: 0
PHOTOPHOBIA: 0
CONSTIPATION: 1
BACK PAIN: 1
COUGH: 1

## 2025-07-09 NOTE — PROGRESS NOTES
[facility] NEUROLOG[facility]  [address], [address]  [place] [address]  Phone: [phone] Fax [phone]  Michelle Terwilliger, APRN - [place]       Patient: Anum Finn  CC:   Chief Complaint   Patient presents with    Other     NTP  Tremors and walking have gotten worse       Referring Provider: No ref. provider found     History of Present Illness:     Anum Finn is a 75 y.o. RH female who presents for evaluation of tremors.     Accompanied by: Self and grand-daughter Lisa    Patient states that she will have some tremors at times but does not seem to be as bad as it was. She states that at times she would have to use two hands. She states that she has not had any further episodes of falling and near syncopal episodes since no longer on propranolol. She states that her BP has been stable. She does check her blood pressure at home. She states that she has some dizziness and feels light headed when she gets up in the morning. She uses a walker at home and uses a four prong cane when out.     Orthostatic hypotension  Patient states that she has seen cardiology one month ago. She is still taking midodrin. She is aware that she needs to get up slowing from lying, sitting to standing. Patient has a pacemaker and she states that she had this checked a month ago.    Unsteady gait  She uses a quad cane when out and uses a walker when she is at home. She states that she had completed physical therapy a year ago. She states that she does some chair exercise and tries to do walking at home.     Hx of falls  Ambulates with quad cane away from home. She uses walker when at home. Patient had completed therapy a year ago. She is home bound and she is not currently driving.         Last OV with Dr Sunshine on 12/18/2024 for follow up on tremor. She had a trial of propranolol but stopped due to orthostatic hypotension.       Past Medical History:     Past medical history, surgical history, social history,

## (undated) DEVICE — ADHESIVE SKIN CLSR 0.7ML TOP DERMBND ADV

## (undated) DEVICE — SUTURE V-LOC 90 3-0 L9IN ABSRB VLT L26MM V-20 1/2 CIR TAPR VLOCM0644

## (undated) DEVICE — DRESSING POSTOP AG PRISMASEAL 3.5X6IN

## (undated) DEVICE — IMMOBILIZER SHLDR L L9X19.5IN R/L CANVS W/ WAIST STRP THMB

## (undated) DEVICE — SUTURE V-LOC 180 SZ 2-0 L9IN ABSRB VLT GS-21 L37MM 1/2 CIR VLOCM0345

## (undated) DEVICE — SUTURE TICRON SZ 0 L36IN NONABSORBABLE BLU CV 300 L35MM 1 2 8886339361

## (undated) DEVICE — PLASMABLADE X PS210-030S-LIGHT 3.0SL: Brand: PLASMABLADE™ X

## (undated) DEVICE — INTRODUCER SPLIT SHEATH PRELUDE SNAP 6FR X 13CM

## (undated) DEVICE — GOWN,PREVENTION PLUS,2XL,ST,22/CS: Brand: MEDLINE